# Patient Record
Sex: MALE | Race: WHITE | NOT HISPANIC OR LATINO | URBAN - METROPOLITAN AREA
[De-identification: names, ages, dates, MRNs, and addresses within clinical notes are randomized per-mention and may not be internally consistent; named-entity substitution may affect disease eponyms.]

---

## 2017-04-18 ENCOUNTER — INPATIENT (INPATIENT)
Facility: HOSPITAL | Age: 82
LOS: 2 days | Discharge: NURSING, REHAB OR LONG-TERM CARE | End: 2017-04-21
Attending: HOSPITALIST | Admitting: HOSPITALIST
Payer: COMMERCIAL

## 2017-04-18 VITALS
WEIGHT: 179.9 LBS | SYSTOLIC BLOOD PRESSURE: 119 MMHG | RESPIRATION RATE: 17 BRPM | HEART RATE: 103 BPM | HEIGHT: 62 IN | DIASTOLIC BLOOD PRESSURE: 78 MMHG | OXYGEN SATURATION: 96 %

## 2017-04-18 DIAGNOSIS — I10 ESSENTIAL (PRIMARY) HYPERTENSION: ICD-10-CM

## 2017-04-18 DIAGNOSIS — R73.03 PREDIABETES: ICD-10-CM

## 2017-04-18 DIAGNOSIS — Z29.9 ENCOUNTER FOR PROPHYLACTIC MEASURES, UNSPECIFIED: ICD-10-CM

## 2017-04-18 DIAGNOSIS — E66.09 OTHER OBESITY DUE TO EXCESS CALORIES: ICD-10-CM

## 2017-04-18 DIAGNOSIS — R29.6 REPEATED FALLS: ICD-10-CM

## 2017-04-18 DIAGNOSIS — I48.2 CHRONIC ATRIAL FIBRILLATION: ICD-10-CM

## 2017-04-18 DIAGNOSIS — R05 COUGH: ICD-10-CM

## 2017-04-18 LAB
ALBUMIN SERPL ELPH-MCNC: 3.3 G/DL — SIGNIFICANT CHANGE UP (ref 3.3–5)
ALP SERPL-CCNC: 55 U/L — SIGNIFICANT CHANGE UP (ref 40–120)
ALT FLD-CCNC: 36 U/L — SIGNIFICANT CHANGE UP (ref 12–78)
ANION GAP SERPL CALC-SCNC: 8 MMOL/L — SIGNIFICANT CHANGE UP (ref 5–17)
APTT BLD: 41.4 SEC — HIGH (ref 27.5–37.4)
AST SERPL-CCNC: 29 U/L — SIGNIFICANT CHANGE UP (ref 15–37)
BASOPHILS # BLD AUTO: 0 K/UL — SIGNIFICANT CHANGE UP (ref 0–0.2)
BASOPHILS NFR BLD AUTO: 0.6 % — SIGNIFICANT CHANGE UP (ref 0–2)
BILIRUB SERPL-MCNC: 0.4 MG/DL — SIGNIFICANT CHANGE UP (ref 0.2–1.2)
BUN SERPL-MCNC: 18 MG/DL — SIGNIFICANT CHANGE UP (ref 7–23)
CALCIUM SERPL-MCNC: 8.2 MG/DL — LOW (ref 8.5–10.1)
CHLORIDE SERPL-SCNC: 107 MMOL/L — SIGNIFICANT CHANGE UP (ref 96–108)
CO2 SERPL-SCNC: 28 MMOL/L — SIGNIFICANT CHANGE UP (ref 22–31)
CREAT SERPL-MCNC: 0.89 MG/DL — SIGNIFICANT CHANGE UP (ref 0.5–1.3)
EOSINOPHIL # BLD AUTO: 0.1 K/UL — SIGNIFICANT CHANGE UP (ref 0–0.5)
EOSINOPHIL NFR BLD AUTO: 1.9 % — SIGNIFICANT CHANGE UP (ref 0–6)
GLUCOSE SERPL-MCNC: 105 MG/DL — HIGH (ref 70–99)
HCT VFR BLD CALC: 40.5 % — SIGNIFICANT CHANGE UP (ref 39–50)
HGB BLD-MCNC: 14.6 G/DL — SIGNIFICANT CHANGE UP (ref 13–17)
INR BLD: 2.69 RATIO — HIGH (ref 0.88–1.16)
LACTATE SERPL-SCNC: 1 MMOL/L — SIGNIFICANT CHANGE UP (ref 0.7–2)
LYMPHOCYTES # BLD AUTO: 1.6 K/UL — SIGNIFICANT CHANGE UP (ref 1–3.3)
LYMPHOCYTES # BLD AUTO: 27.3 % — SIGNIFICANT CHANGE UP (ref 13–44)
MCHC RBC-ENTMCNC: 32.2 PG — SIGNIFICANT CHANGE UP (ref 27–34)
MCHC RBC-ENTMCNC: 36.1 GM/DL — HIGH (ref 32–36)
MCV RBC AUTO: 89.3 FL — SIGNIFICANT CHANGE UP (ref 80–100)
MONOCYTES # BLD AUTO: 0.6 K/UL — SIGNIFICANT CHANGE UP (ref 0–0.9)
MONOCYTES NFR BLD AUTO: 10.3 % — SIGNIFICANT CHANGE UP (ref 2–14)
NEUTROPHILS # BLD AUTO: 3.6 K/UL — SIGNIFICANT CHANGE UP (ref 1.8–7.4)
NEUTROPHILS NFR BLD AUTO: 59.9 % — SIGNIFICANT CHANGE UP (ref 43–77)
PLATELET # BLD AUTO: 143 K/UL — LOW (ref 150–400)
POTASSIUM SERPL-MCNC: 4.1 MMOL/L — SIGNIFICANT CHANGE UP (ref 3.5–5.3)
POTASSIUM SERPL-SCNC: 4.1 MMOL/L — SIGNIFICANT CHANGE UP (ref 3.5–5.3)
PROT SERPL-MCNC: 7.2 GM/DL — SIGNIFICANT CHANGE UP (ref 6–8.3)
PROTHROM AB SERPL-ACNC: 29.9 SEC — HIGH (ref 9.8–12.7)
RBC # BLD: 4.54 M/UL — SIGNIFICANT CHANGE UP (ref 4.2–5.8)
RBC # FLD: 11.8 % — SIGNIFICANT CHANGE UP (ref 11–15)
SODIUM SERPL-SCNC: 143 MMOL/L — SIGNIFICANT CHANGE UP (ref 135–145)
WBC # BLD: 6 K/UL — SIGNIFICANT CHANGE UP (ref 3.8–10.5)
WBC # FLD AUTO: 6 K/UL — SIGNIFICANT CHANGE UP (ref 3.8–10.5)

## 2017-04-18 PROCEDURE — 76377 3D RENDER W/INTRP POSTPROCES: CPT | Mod: 26

## 2017-04-18 PROCEDURE — 99223 1ST HOSP IP/OBS HIGH 75: CPT

## 2017-04-18 PROCEDURE — 71250 CT THORAX DX C-: CPT | Mod: 26

## 2017-04-18 PROCEDURE — 99285 EMERGENCY DEPT VISIT HI MDM: CPT

## 2017-04-18 PROCEDURE — 70450 CT HEAD/BRAIN W/O DYE: CPT | Mod: 26

## 2017-04-18 PROCEDURE — 71010: CPT | Mod: 26

## 2017-04-18 PROCEDURE — 72131 CT LUMBAR SPINE W/O DYE: CPT | Mod: 26

## 2017-04-18 RX ORDER — MORPHINE SULFATE 50 MG/1
2 CAPSULE, EXTENDED RELEASE ORAL EVERY 4 HOURS
Qty: 0 | Refills: 0 | Status: DISCONTINUED | OUTPATIENT
Start: 2017-04-18 | End: 2017-04-21

## 2017-04-18 RX ORDER — WARFARIN SODIUM 2.5 MG/1
5 TABLET ORAL DAILY
Qty: 0 | Refills: 0 | Status: DISCONTINUED | OUTPATIENT
Start: 2017-04-18 | End: 2017-04-18

## 2017-04-18 RX ORDER — TRAMADOL HYDROCHLORIDE 50 MG/1
50 TABLET ORAL ONCE
Qty: 0 | Refills: 0 | Status: DISCONTINUED | OUTPATIENT
Start: 2017-04-18 | End: 2017-04-18

## 2017-04-18 RX ORDER — ACETAMINOPHEN 500 MG
650 TABLET ORAL EVERY 6 HOURS
Qty: 0 | Refills: 0 | Status: DISCONTINUED | OUTPATIENT
Start: 2017-04-18 | End: 2017-04-21

## 2017-04-18 RX ORDER — LISINOPRIL 2.5 MG/1
10 TABLET ORAL DAILY
Qty: 0 | Refills: 0 | Status: DISCONTINUED | OUTPATIENT
Start: 2017-04-18 | End: 2017-04-21

## 2017-04-18 RX ORDER — CYCLOBENZAPRINE HYDROCHLORIDE 10 MG/1
10 TABLET, FILM COATED ORAL THREE TIMES A DAY
Qty: 0 | Refills: 0 | Status: DISCONTINUED | OUTPATIENT
Start: 2017-04-18 | End: 2017-04-21

## 2017-04-18 RX ORDER — FOLIC ACID 0.8 MG
1 TABLET ORAL DAILY
Qty: 0 | Refills: 0 | Status: DISCONTINUED | OUTPATIENT
Start: 2017-04-18 | End: 2017-04-21

## 2017-04-18 RX ORDER — WARFARIN SODIUM 2.5 MG/1
6 TABLET ORAL DAILY
Qty: 0 | Refills: 0 | Status: DISCONTINUED | OUTPATIENT
Start: 2017-04-18 | End: 2017-04-18

## 2017-04-18 RX ORDER — WARFARIN SODIUM 2.5 MG/1
5 TABLET ORAL ONCE
Qty: 0 | Refills: 0 | Status: COMPLETED | OUTPATIENT
Start: 2017-04-18 | End: 2017-04-18

## 2017-04-18 RX ORDER — DOXAZOSIN MESYLATE 4 MG
2 TABLET ORAL AT BEDTIME
Qty: 0 | Refills: 0 | Status: DISCONTINUED | OUTPATIENT
Start: 2017-04-18 | End: 2017-04-21

## 2017-04-18 RX ORDER — TAMSULOSIN HYDROCHLORIDE 0.4 MG/1
0.4 CAPSULE ORAL AT BEDTIME
Qty: 0 | Refills: 0 | Status: DISCONTINUED | OUTPATIENT
Start: 2017-04-18 | End: 2017-04-21

## 2017-04-18 RX ORDER — CEFUROXIME AXETIL 250 MG
250 TABLET ORAL EVERY 12 HOURS
Qty: 0 | Refills: 0 | Status: COMPLETED | OUTPATIENT
Start: 2017-04-18 | End: 2017-04-21

## 2017-04-18 RX ORDER — METOPROLOL TARTRATE 50 MG
25 TABLET ORAL DAILY
Qty: 0 | Refills: 0 | Status: DISCONTINUED | OUTPATIENT
Start: 2017-04-18 | End: 2017-04-21

## 2017-04-18 RX ADMIN — TRAMADOL HYDROCHLORIDE 50 MILLIGRAM(S): 50 TABLET ORAL at 20:21

## 2017-04-18 RX ADMIN — TAMSULOSIN HYDROCHLORIDE 0.4 MILLIGRAM(S): 0.4 CAPSULE ORAL at 23:58

## 2017-04-18 RX ADMIN — TRAMADOL HYDROCHLORIDE 50 MILLIGRAM(S): 50 TABLET ORAL at 21:26

## 2017-04-18 RX ADMIN — WARFARIN SODIUM 5 MILLIGRAM(S): 2.5 TABLET ORAL at 23:59

## 2017-04-18 RX ADMIN — Medication 100 MILLIGRAM(S): at 23:58

## 2017-04-18 NOTE — H&P ADULT. - NEUROLOGICAL DETAILS
sensation intact/alert and oriented x 3/responds to pain/responds to verbal commands/normal strength/no spontaneous movement/superficial reflexes intact/deep reflexes intact

## 2017-04-18 NOTE — H&P ADULT. - PROBLEM SELECTOR PLAN 4
CHO diet  Will order A1C Continue Coumadin-family unsure of exact dosing (they think it is 5 mg MWF, 6 mg TThSSun), confirm dosing  INR ordered

## 2017-04-18 NOTE — H&P ADULT. - PROBLEM SELECTOR PLAN 1
Likely due to worsening sciatica  Obtain records from "back specialist" (family will bring more information tomorrow during the day regarding type of doctor and treatments to date)  PT consult  Ortho consult  MRI LS  Morphine 2mg IM [id number] PRN pain 7-10  Percocet 5/325 mg po [id number] PRN pain 4-7  Tylenol 650 mg po [id number] PRN pain 1-3.  Flexeril 10mg po TID

## 2017-04-18 NOTE — H&P ADULT. - NEGATIVE NEUROLOGICAL SYMPTOMS
no loss of consciousness/no hemiparesis/no syncope/no facial palsy/no generalized seizures/no paresthesias/no tremors/no headache/no transient paralysis/no confusion/no loss of sensation/no weakness

## 2017-04-18 NOTE — H&P ADULT. - PROBLEM SELECTOR PLAN 2
Continue home meds, titrate as necessary Abx were started by his PMD as outpt for cough, resolved  Will complete course (will be done this Friday, 3 more days)

## 2017-04-18 NOTE — ED PROVIDER NOTE - OBJECTIVE STATEMENT
83 yo with cough for four days and multiple falls over the last one week. Patient states that his left buttock hurts. Patient denies chest pain, syncope, headache.

## 2017-04-18 NOTE — H&P ADULT. - HISTORY OF PRESENT ILLNESS
82 year old Sami/English speaking man with PMH HTN, A Fib on coumadin, history of prostate Ca s/p radiation, and sciatica presents to ED with frequent falls and severe lumbar back Pain.  As per patient and family (wife, daughter) patient has been seeing a "back specialist" as his pain has been increasing.  Recently the pain has gotten so bad that he falls when he walks due to the pain.  patient says the pain starts in his lower back and radiates down his left leg.  He denies loss of sensation, loss of bowel or bladder control, loss of function, fever, chills.    In the ED, CT LS showed multilevel severe degenerative changes.

## 2017-04-18 NOTE — H&P ADULT. - PROBLEM SELECTOR PLAN 3
Continue Coumadin-family unsure of exact dosing (they think it is 5 mg MWF, 6 mg TThSSun), confirm dosing  INR ordered Continue home meds, titrate as necessary

## 2017-04-18 NOTE — H&P ADULT. - NEGATIVE CARDIOVASCULAR SYMPTOMS
no palpitations/no orthopnea/no dyspnea on exertion/no paroxysmal nocturnal dyspnea/no claudication/no chest pain/no peripheral edema

## 2017-04-18 NOTE — ED ADULT NURSE NOTE - CHIEF COMPLAINT QUOTE
slipped and fell while using walker denies any dizziness or loc. Pt c/o lower back pain and left leg pain. Pt had recurrent episode of falling this week. Pt on antibiotic for pneumonia

## 2017-04-18 NOTE — H&P ADULT. - ASSESSMENT
82 year old Azeri/English speaking man with PMH HTN, A Fib on coumadin, history of prostate Ca s/p radiation, and sciatica presents to ED with frequent falls and severe lumbar back Pain; patient will require admission for at least 2 midnights for further evaluation and mgmt of severe lumbar pain as detailed below:

## 2017-04-18 NOTE — ED ADULT NURSE NOTE - PMH
Atrial fibrillation    Borderline diabetes    Cataract of left eye    Cervical disc displacement    HTN (hypertension)    Hx of radiation therapy  2011  Kidney cyst, acquired  left  Obesity (BMI 30-39.9)    Prostate cancer  2011

## 2017-04-19 DIAGNOSIS — M54.9 DORSALGIA, UNSPECIFIED: ICD-10-CM

## 2017-04-19 DIAGNOSIS — E66.9 OBESITY, UNSPECIFIED: ICD-10-CM

## 2017-04-19 LAB
HBA1C BLD-MCNC: 7 % — HIGH (ref 4–5.6)
INR BLD: 2.67 RATIO — HIGH (ref 0.88–1.16)
PROTHROM AB SERPL-ACNC: 29.7 SEC — HIGH (ref 9.8–12.7)

## 2017-04-19 PROCEDURE — 99233 SBSQ HOSP IP/OBS HIGH 50: CPT

## 2017-04-19 PROCEDURE — 72100 X-RAY EXAM L-S SPINE 2/3 VWS: CPT | Mod: 26

## 2017-04-19 PROCEDURE — 71030: CPT | Mod: 26,52

## 2017-04-19 PROCEDURE — 72148 MRI LUMBAR SPINE W/O DYE: CPT | Mod: 26

## 2017-04-19 RX ORDER — DEXAMETHASONE 0.5 MG/5ML
10 ELIXIR ORAL EVERY 8 HOURS
Qty: 0 | Refills: 0 | Status: COMPLETED | OUTPATIENT
Start: 2017-04-19 | End: 2017-04-20

## 2017-04-19 RX ORDER — WARFARIN SODIUM 2.5 MG/1
5 TABLET ORAL ONCE
Qty: 0 | Refills: 0 | Status: COMPLETED | OUTPATIENT
Start: 2017-04-19 | End: 2017-04-19

## 2017-04-19 RX ADMIN — Medication 250 MILLIGRAM(S): at 06:26

## 2017-04-19 RX ADMIN — Medication 1 TABLET(S): at 11:00

## 2017-04-19 RX ADMIN — WARFARIN SODIUM 5 MILLIGRAM(S): 2.5 TABLET ORAL at 23:13

## 2017-04-19 RX ADMIN — Medication 102 MILLIGRAM(S): at 23:12

## 2017-04-19 RX ADMIN — TAMSULOSIN HYDROCHLORIDE 0.4 MILLIGRAM(S): 0.4 CAPSULE ORAL at 23:13

## 2017-04-19 RX ADMIN — Medication 2 MILLIGRAM(S): at 00:08

## 2017-04-19 RX ADMIN — Medication 102 MILLIGRAM(S): at 14:35

## 2017-04-19 RX ADMIN — Medication 250 MILLIGRAM(S): at 17:49

## 2017-04-19 RX ADMIN — Medication 100 MILLIGRAM(S): at 06:26

## 2017-04-19 RX ADMIN — Medication 1 MILLIGRAM(S): at 11:00

## 2017-04-19 RX ADMIN — Medication 2 MILLIGRAM(S): at 23:13

## 2017-04-19 RX ADMIN — Medication 100 MILLIGRAM(S): at 14:36

## 2017-04-19 RX ADMIN — Medication 100 MILLIGRAM(S): at 23:14

## 2017-04-19 NOTE — PHYSICAL THERAPY INITIAL EVALUATION ADULT - ADDITIONAL COMMENTS
As per patient, lives c wife in private house c 3 stair steps to enter. Level floor to bedroom/bathroom. Used a cane prior to admission. Denies HHA services.

## 2017-04-19 NOTE — DIETITIAN INITIAL EVALUATION ADULT. - OTHER INFO
Pt seen for MD consult 4/18 Education & Assessment. Pt lives with wife; wife does cooking & food shopping. Pt with hx prediabetes & currently follows unrestricted diet. No BM x 1 day. Pt consumed 100% breakfast this am.

## 2017-04-19 NOTE — PHYSICAL THERAPY INITIAL EVALUATION ADULT - PERTINENT HX OF CURRENT PROBLEM, REHAB EVAL
Patient came in c increased low back pain and increased falls. Ortho consulted and ruled out cord compression or fractures. Chart reviewed and noted MRI spine showing multi-level degenerative changes; XRay LS showing stenosis.

## 2017-04-19 NOTE — PHYSICAL THERAPY INITIAL EVALUATION ADULT - CRITERIA FOR SKILLED THERAPEUTIC INTERVENTIONS
impairments found/anticipated discharge recommendation/therapy frequency/functional limitations in following categories/risk reduction/prevention/rehab potential

## 2017-04-19 NOTE — PHYSICAL THERAPY INITIAL EVALUATION ADULT - TRANSFER SAFETY CONCERNS NOTED: BED/CHAIR, REHAB EVAL
decreased balance during turns/decreased safety awareness/losing balance/decreased weight-shifting ability

## 2017-04-19 NOTE — DIETITIAN INITIAL EVALUATION ADULT. - PROBLEM SELECTOR PLAN 4
Continue Coumadin-family unsure of exact dosing (they think it is 5 mg MWF, 6 mg TThSSun), confirm dosing  INR ordered

## 2017-04-19 NOTE — PHYSICAL THERAPY INITIAL EVALUATION ADULT - PLANNED THERAPY INTERVENTIONS, PT EVAL
swiss ball techniques/strengthening/bed mobility training/balance training/postural re-education/joint mobilization/stretching/neuromuscular re-education/ROM/gait training

## 2017-04-19 NOTE — DIETITIAN INITIAL EVALUATION ADULT. - SOURCE
other (specify)/Pt's primary language Citizen of [place]. Pt speaks minimal English & prefers to use wife as .  Spoke to pt's wife & medical chart/family/significant other

## 2017-04-19 NOTE — PROGRESS NOTE ADULT - SUBJECTIVE AND OBJECTIVE BOX
Patient is a 82y old  Male who presents with a chief complaint of s/p lami c5-c6  2013  now falling (19 Apr 2017 00:33)       OVERNIGHT EVENTS: c/o back pain overnight     MEDICATIONS  (STANDING):  lisinopril 10milliGRAM(s) Oral daily  doxazosin 2milliGRAM(s) Oral at bedtime  tamsulosin 0.4milliGRAM(s) Oral at bedtime  pregabalin 100milliGRAM(s) Oral three times a day  metoprolol succinate ER 25milliGRAM(s) Oral daily  cefuroxime   Tablet 250milliGRAM(s) Oral every 12 hours  calcium carbonate 1250 mG + Vitamin D (OsCal 500 + D) 1Tablet(s) Oral daily  folic acid 1milliGRAM(s) Oral daily  dexamethasone  IVPB 10milliGRAM(s) IV Intermittent every 8 hours    MEDICATIONS  (PRN):  morphine  - Injectable 2milliGRAM(s) IntraMuscular every 4 hours PRN Severe Pain (7 - 10)  acetaminophen   Tablet. 650milliGRAM(s) Oral every 6 hours PRN Mild Pain (1 - 3)  cyclobenzaprine 10milliGRAM(s) Oral three times a day PRN Muscle Spasm  oxyCODONE  5 mG/acetaminophen 325 mG 2Tablet(s) Oral every 6 hours PRN Moderate Pain (4 - 6)       Vital Signs Last 24 Hrs  T(C): 36.6, Max: 37.1 (04-18 @ 19:14)  T(F): 97.9, Max: 98.8 (04-18 @ 19:14)  HR: 107 (80 - 107)  BP: 118/64 (98/54 - 138/87)  BP(mean): --  RR: 16 (14 - 18)  SpO2: 96% (93% - 99%)    PHYSICAL EXAM:  GENERAL: NAD, well-groomed, well-developed  HEAD:  Atraumatic, Normocephalic  ENMT: No tonsillar erythema, exudates, or enlargement; Moist mucous membranes   NERVOUS SYSTEM:  Alert & Oriented X3, no focal deficits  CHEST/LUNG: Clear to percussion bilaterally; No rales, rhonchi, wheezing, or rubs  HEART: Regular rate and rhythm; No murmurs, rubs, or gallops  ABDOMEN: Soft, Nontender, Nondistended; Bowel sounds present  EXTREMITIES:  2+ Peripheral Pulses, No clubbing, cyanosis, or edema, decreased ROM 2/2 to pain      LABS:                        14.6   6.0   )-----------( 143      ( 18 Apr 2017 16:50 )             40.5     04-18    143  |  107  |  18  ----------------------------<  105<H>  4.1   |  28  |  0.89    Ca    8.2<L>      18 Apr 2017 16:50    TPro  7.2  /  Alb  3.3  /  TBili  0.4  /  DBili  x   /  AST  29  /  ALT  36  /  AlkPhos  55  04-18    PT/INR - ( 19 Apr 2017 08:00 )   PT: 29.7 sec;   INR: 2.67 ratio    Lumbar lordosis is maintained. Minimal grade 1 retrolisthesis L1 on L2,   L2 on L3, and L3 on L4. Vertebral body heights are preserved.   Degenerative Modic endplate signal changes and irregularities are seen at  every level. There is multilevel disc desiccation with loss of space   heights of the lumbar spine.    L1-L2: Diffuse disc bulge and posterior osteophytic ridging and mild   facet arthropathy contributes to mild central canal stenosis. No   significant neuroforaminal narrowing.    L2-L3: Diffuse disc bulge with posterior osteophytic ridging, facet   arthropathy and ligamenta flava thickening contributes to mild central   canal stenosis. No significant neuroforaminal narrowing.    L3-L4: Diffusedisc bulge with posterior osteophytic ridging, facet   arthropathy and ligamenta flava thickening contributes to mild right   neural foraminal narrowing. No significant central canal stenosis.    L4-L5: Small posterior disc osteophyte complex, facet arthropathy and   ligamenta flava thickening contributes to mild right neural foraminal   narrowing. No significant central canal stenosis.    L5-S1: Posterior disc bulge with left subarticular annular fissure, and   facet arthropathy contribute to moderate-severe right and mild left   neural foraminal    The paraspinal muscles are unremarkable. Partially visualized right renal   cyst.      X RAY : Extensive degenerative changes lumbosacral spine. Probable multilevel   stenosis. Follow-up MR or CT imaging may be considered if clinically   warranted    PTT - ( 18 Apr 2017 23:24 )  PTT:41.4 sec   cardiac markers     CAPILLARY BLOOD GLUCOSE    Cultures    RADIOLOGY & ADDITIONAL TESTS:    Imaging Personally Reviewed:  [ x] YES  [ ] NO    Consultant(s) Notes Reviewed:  [x ] YES  [ ] NO    Care Discussed with Consultants/Other Providers [ x] YES  [ ] NO

## 2017-04-19 NOTE — PHYSICAL THERAPY INITIAL EVALUATION ADULT - TRANSFER SAFETY CONCERNS NOTED: SIT/STAND, REHAB EVAL
stepping too close to front of assistive device/decreased balance during turns/decreased step length

## 2017-04-19 NOTE — DIETITIAN INITIAL EVALUATION ADULT. - PROBLEM SELECTOR PLAN 2
Abx were started by his PMD as outpt for cough, resolved  Will complete course (will be done this Friday, 3 more days)

## 2017-04-19 NOTE — CONSULT NOTE ADULT - SUBJECTIVE AND OBJECTIVE BOX
82yMale c/o low back pain and LLE radiculopathy, which has worsened over the last two weeks causing him to fall frequently. Patient denies any hx of trauma, fever, or chills. Patient denies any numbness or tingling in the lower extremities or loss of motor function. Patient denies any changes in urinary or bowel control.    PMH:  Obesity (BMI 30-39.9)  Cervical disc displacement  Atrial fibrillation  Kidney cyst, acquired  Borderline diabetes  Cataract of left eye  Hx of radiation therapy  Prostate cancer  HTN (hypertension)    PSH:  Larynx polyp  Cataract extraction status of left eye    AH: ASA    Meds: See med rec    T(C): 36.7  HR: 89  BP: 110/58  RR: 16  SpO2: 94%  Wt(kg): --    PE Spine:  No TTP over spinous processes, Able to SLR, SILT L2-S1, L3-S1 5/5 BL, +Distal pulses, (-) schulz, (-) clonus, (+) SLR, (-) babinski, 2/4 patella tendon reflex BL.    Secondary:  No TTP over bony landmarks, SILT BL, ROM intact BL, distal pulses palpable.    Imaging:  CT demonstrating grade 1 retrolisthesis of L1 on L2, L2 on L3, and L3 on L4.

## 2017-04-19 NOTE — PHYSICAL THERAPY INITIAL EVALUATION ADULT - MODIFIED CLINICAL TEST OF SENSORY INTEGRATION IN BALANCE TEST
Barthel Index: Feeding Score _10__, Bathing Score _0__, Grooming Score _0__, Dressing Score _5 __, Bowels Score _5__, Bladder Score _5__, Toilet Score _5__, Transfers Score _5__, Mobility Score _0__, Stairs Score _0__,     Total Score _35__

## 2017-04-19 NOTE — DIETITIAN INITIAL EVALUATION ADULT. - PERTINENT MEDS FT
Ceftin, Decadron, Tylenol, Flexeril, Morphine, Percocet, Oscal +D, Folic acid, Toprol LX, Lyrica, Cardura, Flomax, Zestril

## 2017-04-19 NOTE — PHYSICAL THERAPY INITIAL EVALUATION ADULT - GENERAL OBSERVATIONS, REHAB EVAL
Patient encountered supine in bed, vital signs as charted. IV steroid intact. AAOx4. Reports pain on left low back and hip; denies shortness of breath. Chest Auscultation: diminished breath sounds throughout. Palpation: no tactile fremitus. Cough: strong, productive, swallowed.

## 2017-04-19 NOTE — DIETITIAN INITIAL EVALUATION ADULT. - PROBLEM SELECTOR PLAN 1
Likely due to worsening sciatica  Obtain records from "back specialist" (family will bring more information tomorrow during the day regarding type of doctor and treatments to date)  PT consult  Ortho consult  MRI LS  Morphine 2mg IM [id number] PRN pain 7-10  Percocet 5/325 mg po [id number] PRN pain 4-7  Tylenol 650 mg po [id number] PRN pain 1-3.  Flexeril 10mg po TID

## 2017-04-19 NOTE — PROGRESS NOTE ADULT - ASSESSMENT
82M w/ sciatica and LLE radiculopathy  Analgesia  Medrol Dosepak upon discharge  DVT ppx  WBAT  PT/OT  Follow-up with Dr. Tejada as outpatient next week  No acute surgical intervention  Orthopedic stable for discharge

## 2017-04-19 NOTE — PROGRESS NOTE ADULT - SUBJECTIVE AND OBJECTIVE BOX
Imaging reviewed - no signs of cord compression or instability with flexion/extension imaging of the lumbar spine.

## 2017-04-19 NOTE — PROGRESS NOTE ADULT - ASSESSMENT
82 year old w/h/o  HTN, A Fib on coumadin, Prostate Ca s/p radiation and sciatica presents to ED with frequent falls and severe lumbar back Pain.

## 2017-04-19 NOTE — CONSULT NOTE ADULT - ATTENDING COMMENTS
Pt's treatment plan reviewed and agree with conservative plan.  If symptoms not improving then will reevaluate treatment options

## 2017-04-19 NOTE — CONSULT NOTE ADULT - ASSESSMENT
82M w/ LLE Sciatica and no neurological deficits  Analgesia  IV Decadron 10mg Q8H x3 doses  WBAT  DVT ppx while admitted  PT/OT  FU MRI L Spine (ordered by primary team)  Will d/w attending any further recomendations

## 2017-04-19 NOTE — DIETITIAN INITIAL EVALUATION ADULT. - PERTINENT LABORATORY DATA
04-18 Na 143 mmol/L Glu 105 mg/dL<H> K+ 4.1 mmol/L Cr  0.89 mg/dL BUN 18 mg/dL Phos n/a   Alb 3.3 g/dL PAB n/a   Hgb 14.6 g/dL Hct 40.5 %

## 2017-04-20 LAB
ANION GAP SERPL CALC-SCNC: 10 MMOL/L — SIGNIFICANT CHANGE UP (ref 5–17)
BUN SERPL-MCNC: 25 MG/DL — HIGH (ref 7–23)
CALCIUM SERPL-MCNC: 8.4 MG/DL — LOW (ref 8.5–10.1)
CHLORIDE SERPL-SCNC: 105 MMOL/L — SIGNIFICANT CHANGE UP (ref 96–108)
CO2 SERPL-SCNC: 25 MMOL/L — SIGNIFICANT CHANGE UP (ref 22–31)
CREAT SERPL-MCNC: 1.06 MG/DL — SIGNIFICANT CHANGE UP (ref 0.5–1.3)
GLUCOSE SERPL-MCNC: 243 MG/DL — HIGH (ref 70–99)
HCT VFR BLD CALC: 40.5 % — SIGNIFICANT CHANGE UP (ref 39–50)
HGB BLD-MCNC: 14.5 G/DL — SIGNIFICANT CHANGE UP (ref 13–17)
INR BLD: 2.91 RATIO — HIGH (ref 0.88–1.16)
MCHC RBC-ENTMCNC: 31.8 PG — SIGNIFICANT CHANGE UP (ref 27–34)
MCHC RBC-ENTMCNC: 35.7 GM/DL — SIGNIFICANT CHANGE UP (ref 32–36)
MCV RBC AUTO: 88.9 FL — SIGNIFICANT CHANGE UP (ref 80–100)
PLATELET # BLD AUTO: 178 K/UL — SIGNIFICANT CHANGE UP (ref 150–400)
POTASSIUM SERPL-MCNC: 4.6 MMOL/L — SIGNIFICANT CHANGE UP (ref 3.5–5.3)
POTASSIUM SERPL-SCNC: 4.6 MMOL/L — SIGNIFICANT CHANGE UP (ref 3.5–5.3)
PROTHROM AB SERPL-ACNC: 32.4 SEC — HIGH (ref 9.8–12.7)
RBC # BLD: 4.56 M/UL — SIGNIFICANT CHANGE UP (ref 4.2–5.8)
RBC # FLD: 11.5 % — SIGNIFICANT CHANGE UP (ref 11–15)
SODIUM SERPL-SCNC: 140 MMOL/L — SIGNIFICANT CHANGE UP (ref 135–145)
WBC # BLD: 6.1 K/UL — SIGNIFICANT CHANGE UP (ref 3.8–10.5)
WBC # FLD AUTO: 6.1 K/UL — SIGNIFICANT CHANGE UP (ref 3.8–10.5)

## 2017-04-20 PROCEDURE — 99233 SBSQ HOSP IP/OBS HIGH 50: CPT

## 2017-04-20 RX ADMIN — Medication 250 MILLIGRAM(S): at 05:24

## 2017-04-20 RX ADMIN — Medication 100 MILLIGRAM(S): at 22:00

## 2017-04-20 RX ADMIN — Medication 100 MILLIGRAM(S): at 13:38

## 2017-04-20 RX ADMIN — Medication 102 MILLIGRAM(S): at 05:24

## 2017-04-20 RX ADMIN — Medication 100 MILLIGRAM(S): at 05:24

## 2017-04-20 RX ADMIN — Medication 1 MILLIGRAM(S): at 11:01

## 2017-04-20 RX ADMIN — TAMSULOSIN HYDROCHLORIDE 0.4 MILLIGRAM(S): 0.4 CAPSULE ORAL at 22:00

## 2017-04-20 RX ADMIN — Medication 25 MILLIGRAM(S): at 05:24

## 2017-04-20 RX ADMIN — Medication 2 MILLIGRAM(S): at 22:00

## 2017-04-20 RX ADMIN — Medication 1 TABLET(S): at 11:01

## 2017-04-20 RX ADMIN — Medication 250 MILLIGRAM(S): at 17:05

## 2017-04-20 RX ADMIN — Medication 100 MILLIGRAM(S): at 17:07

## 2017-04-20 RX ADMIN — LISINOPRIL 10 MILLIGRAM(S): 2.5 TABLET ORAL at 05:24

## 2017-04-20 NOTE — PROGRESS NOTE ADULT - PROBLEM SELECTOR PLAN 1
- MRI LS noted  - no neurological deficits  - on decadron x3 doses  - pain control- morphine, percocet PRN, lyrica, flexiril  - PT eval =KEENAN  - ortho f/u
- MRI LS noted  - no neurological deficits  - on decadron x3 doses  - pain control- morphine, percocet PRN, lyrica, flexiril  - PT eval =KEENAN  - ortho f/u

## 2017-04-20 NOTE — PROGRESS NOTE ADULT - SUBJECTIVE AND OBJECTIVE BOX
Patient is a 82y old  Male who presents with a chief complaint of s/p lami c5-c6  2013  now falling (19 Apr 2017 00:33)       OVERNIGHT EVENTS: no reported events    MEDICATIONS  (STANDING):  lisinopril 10milliGRAM(s) Oral daily  doxazosin 2milliGRAM(s) Oral at bedtime  tamsulosin 0.4milliGRAM(s) Oral at bedtime  pregabalin 100milliGRAM(s) Oral three times a day  metoprolol succinate ER 25milliGRAM(s) Oral daily  cefuroxime   Tablet 250milliGRAM(s) Oral every 12 hours  calcium carbonate 1250 mG + Vitamin D (OsCal 500 + D) 1Tablet(s) Oral daily  folic acid 1milliGRAM(s) Oral daily    MEDICATIONS  (PRN):  morphine  - Injectable 2milliGRAM(s) IntraMuscular every 4 hours PRN Severe Pain (7 - 10)  acetaminophen   Tablet. 650milliGRAM(s) Oral every 6 hours PRN Mild Pain (1 - 3)  cyclobenzaprine 10milliGRAM(s) Oral three times a day PRN Muscle Spasm  oxyCODONE  5 mG/acetaminophen 325 mG 2Tablet(s) Oral every 6 hours PRN Moderate Pain (4 - 6)      Vital Signs Last 24 Hrs  T(C): 36.6, Max: 36.7 (04-19 @ 23:31)  T(F): 97.9, Max: 98 (04-19 @ 23:31)  HR: 96 (92 - 107)  BP: 89/49 (89/49 - 118/64)  BP(mean): --  RR: 18 (16 - 18)  SpO2: 94% (94% - 98%)     PHYSICAL EXAM:  GENERAL: NAD, well-groomed, well-developed, sitting up in chair , spouse at bedside  HEAD:  Atraumatic, Normocephalic  ENMT: No tonsillar erythema, exudates, or enlargement; Moist mucous membranes   NERVOUS SYSTEM:  Alert & Oriented X3, no focal deficits  CHEST/LUNG: Clear to percussion bilaterally; No rales, rhonchi, wheezing, or rubs  HEART: Regular rate and rhythm; No murmurs, rubs, or gallops  ABDOMEN: Soft, Nontender, Nondistended; Bowel sounds present  EXTREMITIES:  2+ Peripheral Pulses, No clubbing, cyanosis, or edema, improved pain with movement    LABS:                        14.5   6.1   )-----------( 178      ( 20 Apr 2017 07:17 )             40.5     04-20    140  |  105  |  25<H>  ----------------------------<  243<H>  4.6   |  25  |  1.06    Ca    8.4<L>      20 Apr 2017 07:17    TPro  7.2  /  Alb  3.3  /  TBili  0.4  /  DBili  x   /  AST  29  /  ALT  36  /  AlkPhos  55  04-18    PT/INR - ( 20 Apr 2017 07:17 )   PT: 32.4 sec;   INR: 2.91 ratio         PTT - ( 18 Apr 2017 23:24 )  PTT:41.4 sec   cardiac markers     CAPILLARY BLOOD GLUCOSE    Cultures    RADIOLOGY & ADDITIONAL TESTS:    Imaging Personally Reviewed:  [ x] YES  [ ] NO    Consultant(s) Notes Reviewed:  [x ] YES  [ ] NO    Care Discussed with Consultants/Other Providers [x ] YES  [ ] NO

## 2017-04-21 VITALS
HEART RATE: 67 BPM | RESPIRATION RATE: 16 BRPM | TEMPERATURE: 97 F | OXYGEN SATURATION: 98 % | SYSTOLIC BLOOD PRESSURE: 114 MMHG | DIASTOLIC BLOOD PRESSURE: 62 MMHG

## 2017-04-21 LAB
INR BLD: 3.13 RATIO — HIGH (ref 0.88–1.16)
PROTHROM AB SERPL-ACNC: 34.9 SEC — HIGH (ref 9.8–12.7)

## 2017-04-21 PROCEDURE — 99239 HOSP IP/OBS DSCHRG MGMT >30: CPT

## 2017-04-21 RX ORDER — SODIUM CHLORIDE 9 MG/ML
1000 INJECTION, SOLUTION INTRAVENOUS
Qty: 0 | Refills: 0 | Status: DISCONTINUED | OUTPATIENT
Start: 2017-04-21 | End: 2017-04-21

## 2017-04-21 RX ORDER — INSULIN LISPRO 100/ML
VIAL (ML) SUBCUTANEOUS
Qty: 0 | Refills: 0 | Status: DISCONTINUED | OUTPATIENT
Start: 2017-04-21 | End: 2017-04-21

## 2017-04-21 RX ORDER — CEFUROXIME AXETIL 250 MG
1 TABLET ORAL
Qty: 0 | Refills: 0 | COMMUNITY

## 2017-04-21 RX ORDER — INSULIN LISPRO 100/ML
0 VIAL (ML) SUBCUTANEOUS
Qty: 0 | Refills: 0 | COMMUNITY
Start: 2017-04-21

## 2017-04-21 RX ORDER — WARFARIN SODIUM 2.5 MG/1
1 TABLET ORAL
Qty: 0 | Refills: 0 | COMMUNITY

## 2017-04-21 RX ORDER — LISINOPRIL 2.5 MG/1
1 TABLET ORAL
Qty: 0 | Refills: 0 | COMMUNITY

## 2017-04-21 RX ORDER — CYCLOBENZAPRINE HYDROCHLORIDE 10 MG/1
1 TABLET, FILM COATED ORAL
Qty: 0 | Refills: 0 | COMMUNITY
Start: 2017-04-21

## 2017-04-21 RX ORDER — DEXTROSE 50 % IN WATER 50 %
25 SYRINGE (ML) INTRAVENOUS ONCE
Qty: 0 | Refills: 0 | Status: DISCONTINUED | OUTPATIENT
Start: 2017-04-21 | End: 2017-04-21

## 2017-04-21 RX ORDER — GLUCAGON INJECTION, SOLUTION 0.5 MG/.1ML
1 INJECTION, SOLUTION SUBCUTANEOUS ONCE
Qty: 0 | Refills: 0 | Status: DISCONTINUED | OUTPATIENT
Start: 2017-04-21 | End: 2017-04-21

## 2017-04-21 RX ORDER — CHOLECALCIFEROL (VITAMIN D3) 125 MCG
5000 CAPSULE ORAL
Qty: 0 | Refills: 0 | COMMUNITY

## 2017-04-21 RX ORDER — WARFARIN SODIUM 2.5 MG/1
0 TABLET ORAL
Qty: 0 | Refills: 0 | COMMUNITY

## 2017-04-21 RX ORDER — DEXTROSE 50 % IN WATER 50 %
12.5 SYRINGE (ML) INTRAVENOUS ONCE
Qty: 0 | Refills: 0 | Status: DISCONTINUED | OUTPATIENT
Start: 2017-04-21 | End: 2017-04-21

## 2017-04-21 RX ORDER — DEXTROSE 50 % IN WATER 50 %
1 SYRINGE (ML) INTRAVENOUS ONCE
Qty: 0 | Refills: 0 | Status: DISCONTINUED | OUTPATIENT
Start: 2017-04-21 | End: 2017-04-21

## 2017-04-21 RX ADMIN — Medication 1 MILLIGRAM(S): at 12:59

## 2017-04-21 RX ADMIN — Medication 100 MILLIGRAM(S): at 09:03

## 2017-04-21 RX ADMIN — Medication 1: at 17:10

## 2017-04-21 RX ADMIN — Medication 1 TABLET(S): at 12:59

## 2017-04-21 RX ADMIN — Medication 100 MILLIGRAM(S): at 13:37

## 2017-04-21 RX ADMIN — Medication 25 MILLIGRAM(S): at 06:05

## 2017-04-21 RX ADMIN — Medication 250 MILLIGRAM(S): at 06:05

## 2017-04-21 RX ADMIN — Medication 100 MILLIGRAM(S): at 06:05

## 2017-04-21 RX ADMIN — Medication 250 MILLIGRAM(S): at 17:11

## 2017-04-21 NOTE — DISCHARGE NOTE ADULT - PATIENT PORTAL LINK FT
“You can access the [facility] Patient Portal, offered by [facility], by registering with the following website: [email or web address]”

## 2017-04-21 NOTE — DISCHARGE NOTE ADULT - MEDICATION SUMMARY - MEDICATIONS TO STOP TAKING
I will STOP taking the medications listed below when I get home from the hospital:    cefuroxime 250 mg oral tablet  -- 1 tab(s) by mouth 2 times a day    lisinopril 10 mg oral tablet  -- 1 tab(s) by mouth once a day

## 2017-04-21 NOTE — DISCHARGE NOTE ADULT - NS AS DC VTE INSTRUCTION
Coumadin/Warfarin - Potential for adverse drug reactions and interactions/Coumadin/Warfarin - Compliance.../Coumadin/Warfarin - Follow-up monitoring.../Coumadin/Warfarin - Dietary Advice...

## 2017-04-21 NOTE — DISCHARGE NOTE ADULT - MEDICATION SUMMARY - MEDICATIONS TO TAKE
I will START or STAY ON the medications listed below when I get home from the hospital:    Medrol Dosepak 4 mg oral tablet  --  by mouth use as directed  -- Indication: For Sciatica w/ radiculopathy    Percocet 5/325 oral tablet  -- 1 tab(s) by mouth every 6 hours, As Needed -Moderate Pain (4 - 6) - 6)  -- Indication: For Intractable back pain    tamsulosin 0.4 mg oral capsule  -- 1 cap(s) by mouth once a day  -- Indication: For Bph    doxazosin 2 mg oral tablet  -- 1 tab(s) by mouth once a day  -- Indication: For BPH    warfarin  --  by mouth   -- Indication: For Chronic atrial fibrillation    Lyrica 100 mg oral capsule  --  by mouth 3 times a day  -- Indication: For Sciatica    HumaLOG 100 units/mL subcutaneous solution  --  subcutaneous 3 times a day (before meals); 1 Unit(s) if Glucose 151 - 200  2 Unit(s) if Glucose 201 - 250  3 Unit(s) if Glucose 251 - 300  4 Unit(s) if Glucose 301 - 350  5 Unit(s) if Glucose 351 - 400  6 Unit(s) if Glucose Greater Than 400  -- Indication: For diabetes    metoprolol succinate 25 mg oral tablet, extended release  -- 1 tab(s) by mouth once a day  -- Indication: For htn    guaiFENesin 100 mg/5 mL oral liquid  -- 5 milliliter(s) by mouth every 6 hours, As needed, Cough  -- Indication: For Cough    cyclobenzaprine 10 mg oral tablet  -- 1 tab(s) by mouth 3 times a day, As needed, Muscle Spasm  -- Indication: For Back pain    Calcium 600+D oral tablet  --  by mouth once a day  -- Indication: For Supplements    folic acid  --  by mouth once a day  -- Indication: For Supplements

## 2017-04-21 NOTE — DISCHARGE NOTE ADULT - HOSPITAL COURSE
82 year old w/h/o  HTN, A Fib on coumadin, Prostate Ca s/p radiation and sciatica presents to ED with frequent falls and severe lumbar back Pain.   Admitted for  Intractable back pain.    - MRI LS noted= The conus medullaris is normal in signal and position. There is no   abnormal thickening of the cauda equina nerve roots.  Lumbar lordosis is maintained. Minimal grade 1 retrolisthesis L1 on L2,   L2 on L3, and L3 on L4. Vertebral body heights are preserved.   Degenerative Modic endplate signal changes and irregularities are seen at   every level. There is multilevel disc desiccation with loss of space   heights of the lumbar spine.  L1-L2: Diffuse disc bulge and posterior osteophytic ridging and mild   facet arthropathy contributes to mild central canal stenosis. No   significant neuroforaminal narrowing.  L2-L3: Diffuse disc bulge with posterior osteophytic ridging, facet   arthropathy and ligamenta flava thickening contributes to mild central   canal stenosis. No significant neuroforaminal narrowing.  L3-L4: Diffuse disc bulge with posterior osteophytic ridging, facet   arthropathy and ligamenta flava thickening contributes to mild right   neural foraminal narrowing. No significant central canal stenosis.  L4-L5: Small posterior disc osteophyte complex, facet arthropathy and   ligamenta flava thickening contributes to mild right neural foraminal   narrowing. No significant central canal stenosis.  L5-S1: Posterior disc bulge with left subarticular annular fissure, and   facet arthropathy contribute to moderate-severe right and mild left   neural foraminal  The paraspinal muscles are unremarkable. Partially visualized right renal   cyst.    - no neurological deficits  -given  decadron x3 doses  - pain control- morphine, percocet PRN, lyrica, flexiril  - PT eval =KEENAN  - ortho consulted and agree with the above. medrol dose geetha upon d/c    INR=3.13 ON 4/21/17, Holding coumadin dose tonight  recheck INR on 4/22/17 and redose accordingly as per MD at Rehab  monitor BP, lower side. discontinued lisinopril, adjust BP accordingly  d/c planning to KEENAN . discussed with pt and pt's spouse at bedside, son over phone

## 2017-04-21 NOTE — DISCHARGE NOTE ADULT - SECONDARY DIAGNOSIS.
Obesity (BMI 30-39.9) Chronic atrial fibrillation Essential hypertension Benign prostatic hyperplasia with lower urinary tract symptoms, unspecified morphology Type 2 diabetes mellitus with hyperglycemia, without long-term current use of insulin Supratherapeutic INR

## 2017-04-21 NOTE — DISCHARGE NOTE ADULT - MEDICATION SUMMARY - MEDICATIONS TO CHANGE
I will SWITCH the dose or number of times a day I take the medications listed below when I get home from the hospital:    warfarin 5 mg oral tablet  --  by mouth 3 times a week    warfarin 6 mg oral tablet  -- 1 tab(s) by mouth 3 times a week

## 2017-04-21 NOTE — DISCHARGE NOTE ADULT - PLAN OF CARE
improve sciatica and LLE radiculopathy  pain control, physical therapy at [facility], medrol dosepak as per ortho recommendations life style modifications INR=3.13 ON 4/21/17, Holding coumadin dose tonight  recheck INR on 4/22/17 and redose accordingly as per MD at Rehab newly diagnosed, monitor finger sticks  continue with sliding scale for now, likely can be switched to oral upon d/c from rehab continue with home meds monitor BP, lower end. discontinued lisinopril, adjust BP accordingly

## 2017-04-21 NOTE — DISCHARGE NOTE ADULT - CARE PROVIDER_API CALL
Sharla Tejada (DO), Orthopaedic Surgery [facility]  [address] [place] [address]  Phone: [phone]  Fax: [phone]    MARILIA adair  Phone: (   )    -  Fax: (   )    -

## 2017-04-21 NOTE — DISCHARGE NOTE ADULT - CARE PLAN
Principal Discharge DX:	Intractable back pain  Goal:	improve  Instructions for follow-up, activity and diet:	sciatica and LLE radiculopathy  pain control, physical therapy at [facility], medrol dosepak as per ortho recommendations  Secondary Diagnosis:	Obesity (BMI 30-39.9)  Instructions for follow-up, activity and diet:	life style modifications  Secondary Diagnosis:	Chronic atrial fibrillation  Instructions for follow-up, activity and diet:	INR=3.13 ON 4/21/17, Holding coumadin dose tonight  recheck INR on 4/22/17 and redose accordingly as per MD at Rehab  Secondary Diagnosis:	Essential hypertension  Instructions for follow-up, activity and diet:	monitor BP, lower end. discontinued lisinopril, adjust BP accordingly  Secondary Diagnosis:	Benign prostatic hyperplasia with lower urinary tract symptoms, unspecified morphology  Instructions for follow-up, activity and diet:	continue with home meds  Secondary Diagnosis:	Type 2 diabetes mellitus with hyperglycemia, without long-term current use of insulin  Instructions for follow-up, activity and diet:	newly diagnosed, monitor finger sticks  continue with sliding scale for now, likely can be switched to oral upon d/c from rehab  Secondary Diagnosis:	Supratherapeutic INR  Instructions for follow-up, activity and diet:	INR=3.13 ON 4/21/17, Holding coumadin dose tonight  recheck INR on 4/22/17 and redose accordingly as per MD at Rehab Principal Discharge DX:	Intractable back pain  Goal:	improve  Instructions for follow-up, activity and diet:	sciatica and LLE radiculopathy  pain control, physical therapy at [facility], medrol dosepak as per ortho recommendations  Secondary Diagnosis:	Obesity (BMI 30-39.9)  Instructions for follow-up, activity and diet:	life style modifications  Secondary Diagnosis:	Chronic atrial fibrillation  Instructions for follow-up, activity and diet:	INR=3.13 ON 4/21/17, Holding coumadin dose tonight  recheck INR on 4/22/17 and redose accordingly as per MD at Rehab  Secondary Diagnosis:	Essential hypertension  Instructions for follow-up, activity and diet:	monitor BP, lower end. discontinued lisinopril, adjust BP accordingly  Secondary Diagnosis:	Benign prostatic hyperplasia with lower urinary tract symptoms, unspecified morphology  Instructions for follow-up, activity and diet:	continue with home meds  Secondary Diagnosis:	Type 2 diabetes mellitus with hyperglycemia, without long-term current use of insulin  Instructions for follow-up, activity and diet:	newly diagnosed, monitor finger sticks  continue with sliding scale for now, likely can be switched to oral upon d/c from rehab  Secondary Diagnosis:	Supratherapeutic INR  Instructions for follow-up, activity and diet:	INR=3.13 ON 4/21/17, Holding coumadin dose tonight  recheck INR on 4/22/17 and redose accordingly as per MD at Rehab

## 2017-04-24 LAB
CULTURE RESULTS: SIGNIFICANT CHANGE UP
CULTURE RESULTS: SIGNIFICANT CHANGE UP
SPECIMEN SOURCE: SIGNIFICANT CHANGE UP
SPECIMEN SOURCE: SIGNIFICANT CHANGE UP

## 2017-04-26 DIAGNOSIS — Z86.718 PERSONAL HISTORY OF OTHER VENOUS THROMBOSIS AND EMBOLISM: ICD-10-CM

## 2017-04-26 DIAGNOSIS — R05 COUGH: ICD-10-CM

## 2017-04-26 DIAGNOSIS — Z92.3 PERSONAL HISTORY OF IRRADIATION: ICD-10-CM

## 2017-04-26 DIAGNOSIS — Z79.01 LONG TERM (CURRENT) USE OF ANTICOAGULANTS: ICD-10-CM

## 2017-04-26 DIAGNOSIS — Z85.46 PERSONAL HISTORY OF MALIGNANT NEOPLASM OF PROSTATE: ICD-10-CM

## 2017-04-26 DIAGNOSIS — I10 ESSENTIAL (PRIMARY) HYPERTENSION: ICD-10-CM

## 2017-04-26 DIAGNOSIS — I48.2 CHRONIC ATRIAL FIBRILLATION: ICD-10-CM

## 2017-04-26 DIAGNOSIS — E66.9 OBESITY, UNSPECIFIED: ICD-10-CM

## 2017-04-26 DIAGNOSIS — Z91.81 HISTORY OF FALLING: ICD-10-CM

## 2017-04-26 DIAGNOSIS — N28.1 CYST OF KIDNEY, ACQUIRED: ICD-10-CM

## 2017-04-26 DIAGNOSIS — N40.1 BENIGN PROSTATIC HYPERPLASIA WITH LOWER URINARY TRACT SYMPTOMS: ICD-10-CM

## 2017-04-26 DIAGNOSIS — M54.32 SCIATICA, LEFT SIDE: ICD-10-CM

## 2017-04-26 DIAGNOSIS — Z86.711 PERSONAL HISTORY OF PULMONARY EMBOLISM: ICD-10-CM

## 2017-04-26 DIAGNOSIS — M54.16 RADICULOPATHY, LUMBAR REGION: ICD-10-CM

## 2017-04-26 DIAGNOSIS — Z88.6 ALLERGY STATUS TO ANALGESIC AGENT: ICD-10-CM

## 2017-04-26 DIAGNOSIS — E11.65 TYPE 2 DIABETES MELLITUS WITH HYPERGLYCEMIA: ICD-10-CM

## 2017-05-24 NOTE — PATIENT PROFILE ADULT. - NUTRITION PROFILE
calling, states patient is scheduled for surgery tomorrow and is running a fever last pm and again this am. Would like Dr Shaw or nurse to call to discuss, also states needs name of surgeon as she has lost phone number for them. Please call to advise.    no indicators present

## 2018-02-26 ENCOUNTER — EMERGENCY (EMERGENCY)
Facility: HOSPITAL | Age: 83
LOS: 0 days | Discharge: HOME | End: 2018-02-26
Attending: EMERGENCY MEDICINE
Payer: COMMERCIAL

## 2018-02-26 VITALS
RESPIRATION RATE: 17 BRPM | HEART RATE: 87 BPM | TEMPERATURE: 98 F | DIASTOLIC BLOOD PRESSURE: 54 MMHG | OXYGEN SATURATION: 99 % | WEIGHT: 182.98 LBS | SYSTOLIC BLOOD PRESSURE: 114 MMHG | HEIGHT: 63 IN

## 2018-02-26 VITALS
TEMPERATURE: 98 F | SYSTOLIC BLOOD PRESSURE: 103 MMHG | RESPIRATION RATE: 17 BRPM | DIASTOLIC BLOOD PRESSURE: 64 MMHG | OXYGEN SATURATION: 99 % | HEART RATE: 79 BPM

## 2018-02-26 DIAGNOSIS — R73.03 PREDIABETES: ICD-10-CM

## 2018-02-26 DIAGNOSIS — Z85.46 PERSONAL HISTORY OF MALIGNANT NEOPLASM OF PROSTATE: ICD-10-CM

## 2018-02-26 DIAGNOSIS — S00.81XA ABRASION OF OTHER PART OF HEAD, INITIAL ENCOUNTER: ICD-10-CM
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
## 2018-02-26 DIAGNOSIS — I48.91 UNSPECIFIED ATRIAL FIBRILLATION: ICD-10-CM

## 2018-02-26 DIAGNOSIS — I10 ESSENTIAL (PRIMARY) HYPERTENSION: ICD-10-CM

## 2018-02-26 DIAGNOSIS — H26.9 UNSPECIFIED CATARACT: ICD-10-CM

## 2018-02-26 LAB
ALBUMIN SERPL ELPH-MCNC: 3.7 G/DL — SIGNIFICANT CHANGE UP (ref 3.3–5)
ALP SERPL-CCNC: 69 U/L — SIGNIFICANT CHANGE UP (ref 40–120)
ALT FLD-CCNC: 33 U/L — SIGNIFICANT CHANGE UP (ref 12–78)
ANION GAP SERPL CALC-SCNC: 8 MMOL/L — SIGNIFICANT CHANGE UP (ref 5–17)
APTT BLD: 46.4 SEC — HIGH (ref 27.5–37.4)
AST SERPL-CCNC: 35 U/L — SIGNIFICANT CHANGE UP (ref 15–37)
BASOPHILS # BLD AUTO: 0.03 K/UL — SIGNIFICANT CHANGE UP (ref 0–0.2)
BASOPHILS NFR BLD AUTO: 0.5 % — SIGNIFICANT CHANGE UP (ref 0–2)
BILIRUB SERPL-MCNC: 0.4 MG/DL — SIGNIFICANT CHANGE UP (ref 0.2–1.2)
BUN SERPL-MCNC: 25 MG/DL — HIGH (ref 7–23)
CALCIUM SERPL-MCNC: 8.8 MG/DL — SIGNIFICANT CHANGE UP (ref 8.5–10.1)
CHLORIDE SERPL-SCNC: 104 MMOL/L — SIGNIFICANT CHANGE UP (ref 96–108)
CO2 SERPL-SCNC: 28 MMOL/L — SIGNIFICANT CHANGE UP (ref 22–31)
CREAT SERPL-MCNC: 1.39 MG/DL — HIGH (ref 0.5–1.3)
EOSINOPHIL # BLD AUTO: 0.24 K/UL — SIGNIFICANT CHANGE UP (ref 0–0.5)
EOSINOPHIL NFR BLD AUTO: 3.6 % — SIGNIFICANT CHANGE UP (ref 0–6)
GLUCOSE SERPL-MCNC: 148 MG/DL — HIGH (ref 70–99)
HCT VFR BLD CALC: 41.6 % — SIGNIFICANT CHANGE UP (ref 39–50)
HGB BLD-MCNC: 14.6 G/DL — SIGNIFICANT CHANGE UP (ref 13–17)
IMM GRANULOCYTES NFR BLD AUTO: 0.2 % — SIGNIFICANT CHANGE UP (ref 0–1.5)
INR BLD: 2.61 RATIO — HIGH (ref 0.88–1.16)
LYMPHOCYTES # BLD AUTO: 2.22 K/UL — SIGNIFICANT CHANGE UP (ref 1–3.3)
LYMPHOCYTES # BLD AUTO: 33.4 % — SIGNIFICANT CHANGE UP (ref 13–44)
MCHC RBC-ENTMCNC: 32.3 PG — SIGNIFICANT CHANGE UP (ref 27–34)
MCHC RBC-ENTMCNC: 35.1 GM/DL — SIGNIFICANT CHANGE UP (ref 32–36)
MCV RBC AUTO: 92 FL — SIGNIFICANT CHANGE UP (ref 80–100)
MONOCYTES # BLD AUTO: 0.67 K/UL — SIGNIFICANT CHANGE UP (ref 0–0.9)
MONOCYTES NFR BLD AUTO: 10.1 % — SIGNIFICANT CHANGE UP (ref 2–14)
NEUTROPHILS # BLD AUTO: 3.47 K/UL — SIGNIFICANT CHANGE UP (ref 1.8–7.4)
NEUTROPHILS NFR BLD AUTO: 52.2 % — SIGNIFICANT CHANGE UP (ref 43–77)
NRBC # BLD: 0 /100 WBCS — SIGNIFICANT CHANGE UP (ref 0–0)
PLATELET # BLD AUTO: 167 K/UL — SIGNIFICANT CHANGE UP (ref 150–400)
POTASSIUM SERPL-MCNC: 4.7 MMOL/L — SIGNIFICANT CHANGE UP (ref 3.5–5.3)
POTASSIUM SERPL-SCNC: 4.7 MMOL/L — SIGNIFICANT CHANGE UP (ref 3.5–5.3)
PROT SERPL-MCNC: 7.5 GM/DL — SIGNIFICANT CHANGE UP (ref 6–8.3)
PROTHROM AB SERPL-ACNC: 29 SEC — HIGH (ref 9.8–12.7)
RBC # BLD: 4.52 M/UL — SIGNIFICANT CHANGE UP (ref 4.2–5.8)
RBC # FLD: 13.1 % — SIGNIFICANT CHANGE UP (ref 10.3–14.5)
SODIUM SERPL-SCNC: 140 MMOL/L — SIGNIFICANT CHANGE UP (ref 135–145)
WBC # BLD: 6.64 K/UL — SIGNIFICANT CHANGE UP (ref 3.8–10.5)
WBC # FLD AUTO: 6.64 K/UL — SIGNIFICANT CHANGE UP (ref 3.8–10.5)

## 2018-02-26 PROCEDURE — 99285 EMERGENCY DEPT VISIT HI MDM: CPT

## 2018-02-26 PROCEDURE — 76377 3D RENDER W/INTRP POSTPROCES: CPT | Mod: 26,76

## 2018-02-26 PROCEDURE — 70450 CT HEAD/BRAIN W/O DYE: CPT | Mod: 26

## 2018-02-26 PROCEDURE — 70486 CT MAXILLOFACIAL W/O DYE: CPT | Mod: 26

## 2018-02-26 PROCEDURE — 72125 CT NECK SPINE W/O DYE: CPT | Mod: 26

## 2018-02-26 NOTE — ED PROVIDER NOTE - MEDICAL DECISION MAKING DETAILS
pt with remote CVA otherwise well appearing R forehead abrasion to dc with follow up with PMD in 2-3 days.

## 2018-02-26 NOTE — ED PROVIDER NOTE - NEUROLOGICAL, MLM
Alert and oriented, no focal deficits, no motor or sensory deficits. CN II  -XII intact no deficits, NIHSS 0

## 2018-02-26 NOTE — ED ADULT NURSE NOTE - OBJECTIVE STATEMENT
received er bed 26 c/o head injury with abrasion r upper forehead and r orbital ecchymosis s/p fall on saturday in backyard denies loc states was dizzy prior to fall sent by pmd r/o brain bleed pt on warfarin a&ox3 denies dizziness at present denies n/v states pain r upper forehead only upon palpation

## 2018-02-26 NOTE — ED PROVIDER NOTE - OBJECTIVE STATEMENT
83 year old male with PMH of afib on coumadin, Cervical disc disease with previous disease, HTN, Prostate CA presenting to ED due to fall 2 days ago otherwise due to being on coumadin was sent in for CT head. Denies any LOC, fall was mechanical and pt falling on face/nose area with injury.

## 2018-02-26 NOTE — ED ADULT TRIAGE NOTE - CHIEF COMPLAINT QUOTE
Fall, abrasion to right side of head nose bruise and eye swelling He felt dizzy prior to the fall. He fell Saturday night . He went to see doctor today and referred to ed. He is on Coumadin

## 2018-02-26 NOTE — ED PROVIDER NOTE - ENMT, MLM
Airway patent, Nasal mucosa clear. Mouth with normal mucosa. Throat has no vesicles, no oropharyngeal exudates and uvula is midline. R side eye lid area with echymosis, bruising, abrasion to R forehead.

## 2018-04-17 ENCOUNTER — APPOINTMENT (OUTPATIENT)
Dept: VASCULAR SURGERY | Facility: CLINIC | Age: 83
End: 2018-04-17
Payer: MEDICARE

## 2018-04-17 VITALS
TEMPERATURE: 97.7 F | BODY MASS INDEX: 31.89 KG/M2 | SYSTOLIC BLOOD PRESSURE: 126 MMHG | DIASTOLIC BLOOD PRESSURE: 70 MMHG | HEART RATE: 91 BPM | HEIGHT: 63 IN | WEIGHT: 180 LBS

## 2018-04-17 DIAGNOSIS — Z87.39 PERSONAL HISTORY OF OTHER DISEASES OF THE MUSCULOSKELETAL SYSTEM AND CONNECTIVE TISSUE: ICD-10-CM

## 2018-04-17 DIAGNOSIS — Z86.39 PERSONAL HISTORY OF OTHER ENDOCRINE, NUTRITIONAL AND METABOLIC DISEASE: ICD-10-CM

## 2018-04-17 DIAGNOSIS — Z86.79 PERSONAL HISTORY OF OTHER DISEASES OF THE CIRCULATORY SYSTEM: ICD-10-CM

## 2018-04-17 DIAGNOSIS — Z85.46 PERSONAL HISTORY OF MALIGNANT NEOPLASM OF PROSTATE: ICD-10-CM

## 2018-04-17 PROCEDURE — 93880 EXTRACRANIAL BILAT STUDY: CPT

## 2018-04-17 PROCEDURE — 99202 OFFICE O/P NEW SF 15 MIN: CPT

## 2018-04-23 ENCOUNTER — OUTPATIENT (OUTPATIENT)
Dept: OUTPATIENT SERVICES | Facility: HOSPITAL | Age: 83
LOS: 1 days | End: 2018-04-23
Payer: COMMERCIAL

## 2018-04-23 ENCOUNTER — APPOINTMENT (OUTPATIENT)
Dept: CT IMAGING | Facility: IMAGING CENTER | Age: 83
End: 2018-04-23
Payer: MEDICARE

## 2018-04-23 DIAGNOSIS — Z00.8 ENCOUNTER FOR OTHER GENERAL EXAMINATION: ICD-10-CM

## 2018-04-23 PROCEDURE — 70498 CT ANGIOGRAPHY NECK: CPT

## 2018-04-23 PROCEDURE — 70496 CT ANGIOGRAPHY HEAD: CPT | Mod: 26

## 2018-04-23 PROCEDURE — 82565 ASSAY OF CREATININE: CPT

## 2018-04-23 PROCEDURE — 70496 CT ANGIOGRAPHY HEAD: CPT

## 2018-04-23 PROCEDURE — 70498 CT ANGIOGRAPHY NECK: CPT | Mod: 26

## 2018-04-25 ENCOUNTER — RESULT REVIEW (OUTPATIENT)
Age: 83
End: 2018-04-25

## 2019-09-23 ENCOUNTER — EMERGENCY (EMERGENCY)
Facility: HOSPITAL | Age: 84
LOS: 0 days | Discharge: HOME | End: 2019-09-24
Attending: EMERGENCY MEDICINE
Payer: COMMERCIAL

## 2019-09-23 VITALS
OXYGEN SATURATION: 98 % | WEIGHT: 179.9 LBS | TEMPERATURE: 99 F | DIASTOLIC BLOOD PRESSURE: 82 MMHG | HEIGHT: 64 IN | RESPIRATION RATE: 16 BRPM | SYSTOLIC BLOOD PRESSURE: 136 MMHG | HEART RATE: 90 BPM

## 2019-09-23 DIAGNOSIS — Z79.4 LONG TERM (CURRENT) USE OF INSULIN: ICD-10-CM
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
## 2019-09-23 DIAGNOSIS — R51 HEADACHE: ICD-10-CM

## 2019-09-23 DIAGNOSIS — S01.01XA LACERATION WITHOUT FOREIGN BODY OF SCALP, INITIAL ENCOUNTER: ICD-10-CM

## 2019-09-23 DIAGNOSIS — Z88.8 ALLERGY STATUS TO OTHER DRUGS, MEDICAMENTS AND BIOLOGICAL SUBSTANCES STATUS: ICD-10-CM
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
## 2019-09-23 DIAGNOSIS — S09.90XA UNSPECIFIED INJURY OF HEAD, INITIAL ENCOUNTER: ICD-10-CM

## 2019-09-23 PROCEDURE — 99284 EMERGENCY DEPT VISIT MOD MDM: CPT

## 2019-09-23 PROCEDURE — 72125 CT NECK SPINE W/O DYE: CPT | Mod: 26

## 2019-09-23 PROCEDURE — 70450 CT HEAD/BRAIN W/O DYE: CPT | Mod: 26

## 2019-09-23 NOTE — ED PROVIDER NOTE - CLINICAL SUMMARY MEDICAL DECISION MAKING FREE TEXT BOX
Pt w above dx, labs & imaging neg for acute pathology.  WOund thoroughly cleansed, no need for sutures, family instructed on carte.  Discussed results and outcome of testing with the patient, given copy as well.  Patient advised to please follow up with their primary care doctor within the next 24 hours and return to the Emergency Department for worsening symptoms or any other concerns.  Patient advised that their doctor may call  to follow up on the specific results of the tests performed today in the emergency department.

## 2019-09-23 NOTE — ED ADULT TRIAGE NOTE - CHIEF COMPLAINT QUOTE
pt fell while getting into bed and hit top of head on the nightstand. denies LOC. + laceration to top of head. pt on coumadin. pt fell again while going to car because he states that his right foot was hurting him also. bleeding controlled at this time.

## 2019-09-23 NOTE — ED PROVIDER NOTE - PATIENT PORTAL LINK FT
You can access the FollowMyHealth Patient Portal offered by [facility] by registering at the following website: [email or web address]. By joining [employer]’s FollowMyHealth portal, you will also be able to view your health information using other applications (apps) compatible with our system.

## 2019-09-23 NOTE — ED PROVIDER NOTE - OBJECTIVE STATEMENT
Pertinent PMH/PSH/FHx/SHx and Review of Systems contained within:    83yo M w PMH of Afib on coumadin, prostate ca, HTN, spinal stenosis presents to ED for eval s/p head injury.  Pt was trying to get into bed, when the bed moved and pt his the top of his head.  Denies LOC, other injuries.  Pt also has hx of unsteady gait due to spinal stenosis, fell while coming to ED, no head injury at that time.  Pt just resumed coumadin, after d/c med for 5d to have colonoscopy done.    No fever/chills, No photophobia/eye pain/changes in vision, No ear pain/sore throat/dysphagia, No chest pain/palpitations, no SOB/cough/wheeze/stridor, No abdominal pain, No neck/back pain, no rash, no changes in neurological status/function.

## 2019-09-23 NOTE — ED PROVIDER NOTE - PHYSICAL EXAMINATION
Gen: Alert, NAD, GCS 15  Head: !cm superficial laceration to R side top of scalp, no active bleeding, EOMI, normal lids/conjunctiva  ENT: normal hearing, patent oropharynx, MMM  Neck: supple, no tenderness/meningismus, FROM, Trachea midline  Pulm: Bilateral clear BS, normal resp effort, no wheeze/stridor/retractions  CV: Afib, +dist pulses  Abd: soft, NT/ND, +BS, no guarding/rebound tenderness  Mskel: no edema/erythema/cyanosis, FROM in all ext  Skin: no rash  Neuro: no sensory/motor deficits, CN 2-12 intact

## 2019-09-24 LAB
ALBUMIN SERPL ELPH-MCNC: 3.3 G/DL — SIGNIFICANT CHANGE UP (ref 3.3–5)
ALP SERPL-CCNC: 63 U/L — SIGNIFICANT CHANGE UP (ref 40–120)
ALT FLD-CCNC: 40 U/L — SIGNIFICANT CHANGE UP (ref 12–78)
ANION GAP SERPL CALC-SCNC: 6 MMOL/L — SIGNIFICANT CHANGE UP (ref 5–17)
APTT BLD: 27.8 SEC — SIGNIFICANT CHANGE UP (ref 27.5–36.3)
AST SERPL-CCNC: 32 U/L — SIGNIFICANT CHANGE UP (ref 15–37)
BASOPHILS # BLD AUTO: 0.01 K/UL — SIGNIFICANT CHANGE UP (ref 0–0.2)
BASOPHILS NFR BLD AUTO: 0.1 % — SIGNIFICANT CHANGE UP (ref 0–2)
BILIRUB SERPL-MCNC: 0.4 MG/DL — SIGNIFICANT CHANGE UP (ref 0.2–1.2)
BUN SERPL-MCNC: 19 MG/DL — SIGNIFICANT CHANGE UP (ref 7–23)
CALCIUM SERPL-MCNC: 8.9 MG/DL — SIGNIFICANT CHANGE UP (ref 8.5–10.1)
CHLORIDE SERPL-SCNC: 107 MMOL/L — SIGNIFICANT CHANGE UP (ref 96–108)
CO2 SERPL-SCNC: 26 MMOL/L — SIGNIFICANT CHANGE UP (ref 22–31)
CREAT SERPL-MCNC: 1.06 MG/DL — SIGNIFICANT CHANGE UP (ref 0.5–1.3)
EOSINOPHIL # BLD AUTO: 0.2 K/UL — SIGNIFICANT CHANGE UP (ref 0–0.5)
EOSINOPHIL NFR BLD AUTO: 3 % — SIGNIFICANT CHANGE UP (ref 0–6)
GLUCOSE SERPL-MCNC: 145 MG/DL — HIGH (ref 70–99)
HCT VFR BLD CALC: 37.5 % — LOW (ref 39–50)
HGB BLD-MCNC: 12.9 G/DL — LOW (ref 13–17)
IMM GRANULOCYTES NFR BLD AUTO: 0.3 % — SIGNIFICANT CHANGE UP (ref 0–1.5)
INR BLD: 1.11 RATIO — SIGNIFICANT CHANGE UP (ref 0.88–1.16)
LYMPHOCYTES # BLD AUTO: 1.84 K/UL — SIGNIFICANT CHANGE UP (ref 1–3.3)
LYMPHOCYTES # BLD AUTO: 27.5 % — SIGNIFICANT CHANGE UP (ref 13–44)
MCHC RBC-ENTMCNC: 31.4 PG — SIGNIFICANT CHANGE UP (ref 27–34)
MCHC RBC-ENTMCNC: 34.4 GM/DL — SIGNIFICANT CHANGE UP (ref 32–36)
MCV RBC AUTO: 91.2 FL — SIGNIFICANT CHANGE UP (ref 80–100)
MONOCYTES # BLD AUTO: 0.58 K/UL — SIGNIFICANT CHANGE UP (ref 0–0.9)
MONOCYTES NFR BLD AUTO: 8.7 % — SIGNIFICANT CHANGE UP (ref 2–14)
NEUTROPHILS # BLD AUTO: 4.05 K/UL — SIGNIFICANT CHANGE UP (ref 1.8–7.4)
NEUTROPHILS NFR BLD AUTO: 60.4 % — SIGNIFICANT CHANGE UP (ref 43–77)
NRBC # BLD: 0 /100 WBCS — SIGNIFICANT CHANGE UP (ref 0–0)
PLATELET # BLD AUTO: 165 K/UL — SIGNIFICANT CHANGE UP (ref 150–400)
POTASSIUM SERPL-MCNC: 4 MMOL/L — SIGNIFICANT CHANGE UP (ref 3.5–5.3)
POTASSIUM SERPL-SCNC: 4 MMOL/L — SIGNIFICANT CHANGE UP (ref 3.5–5.3)
PROT SERPL-MCNC: 7.2 GM/DL — SIGNIFICANT CHANGE UP (ref 6–8.3)
PROTHROM AB SERPL-ACNC: 12.5 SEC — SIGNIFICANT CHANGE UP (ref 10–12.9)
RBC # BLD: 4.11 M/UL — LOW (ref 4.2–5.8)
RBC # FLD: 12.9 % — SIGNIFICANT CHANGE UP (ref 10.3–14.5)
SODIUM SERPL-SCNC: 139 MMOL/L — SIGNIFICANT CHANGE UP (ref 135–145)
WBC # BLD: 6.7 K/UL — SIGNIFICANT CHANGE UP (ref 3.8–10.5)
WBC # FLD AUTO: 6.7 K/UL — SIGNIFICANT CHANGE UP (ref 3.8–10.5)

## 2019-09-24 RX ORDER — BACITRACIN ZINC 500 UNIT/G
1 OINTMENT IN PACKET (EA) TOPICAL
Qty: 1 | Refills: 0
Start: 2019-09-24 | End: 2019-09-30

## 2019-09-24 NOTE — ED ADULT NURSE NOTE - OBJECTIVE STATEMENT
AO X3 , Accompanied by daughter and spouse to the ED  reported patient fell while getting into bed and hit top of head on the nightstand. denies LOC. + laceration to top of head. pt on coumadin. pt fell again while going to car because he states that his right foot was hurting him also. bleeding controlled at this time. Labs done , resulted CT head r/o bleed unremarkable

## 2019-09-24 NOTE — ED ADULT NURSE NOTE - NSIMPLEMENTINTERV_GEN_ALL_ED
Implemented All Fall with Harm Risk Interventions:  [place] to call system. Call bell, personal items and telephone within reach. Instruct patient to call for assistance. Room bathroom lighting operational. Non-slip footwear when patient is off stretcher. Physically safe environment: no spills, clutter or unnecessary equipment. Stretcher in lowest position, wheels locked, appropriate side rails in place. Provide visual cue, wrist band, yellow gown, etc. Monitor gait and stability. Monitor for mental status changes and reorient to person, place, and time. Review medications for side effects contributing to fall risk. Reinforce activity limits and safety measures with patient and family. Provide visual clues: red socks.

## 2020-10-19 NOTE — DISCHARGE NOTE ADULT - IMPORTANT THAT YOU TELL YOUR HEALTH CARE PROVIDER ABOUT ALL OTHER MEDICINES YOU ARE TAKING INCLUDING OVER-THE-COUNTER MEDICATIONS, HERBS, DIET SUPPLEMENTS, OR PRODUCTS CONTAINING VITAMIN K. CALL YOUR
Rescue dog    Increase your dose of losartan to a full pill (25mg) and come back for a pharmacy bp check in about a month    Alert me if you want to switch to vesicare (check out 's formulary)    Pap smear today            Preventive Health Recommendations  Female Ages 50 - 64    Yearly exam: See your health care provider every year in order to  o Review health changes.   o Discuss preventive care.    o Review your medicines if your doctor has prescribed any.      Get a Pap test every three years (unless you have an abnormal result and your provider advises testing more often).    If you get Pap tests with HPV test, you only need to test every 5 years, unless you have an abnormal result.     You do not need a Pap test if your uterus was removed (hysterectomy) and you have not had cancer.    You should be tested each year for STDs (sexually transmitted diseases) if you're at risk.     Have a mammogram every 1 to 2 years.    Have a colonoscopy at age 50, or have a yearly FIT test (stool test). These exams screen for colon cancer.      Have a cholesterol test every 5 years, or more often if advised.    Have a diabetes test (fasting glucose) every three years. If you are at risk for diabetes, you should have this test more often.     If you are at risk for osteoporosis (brittle bone disease), think about having a bone density scan (DEXA).    Shots: Get a flu shot each year. Get a tetanus shot every 10 years.    Nutrition:     Eat at least 5 servings of fruits and vegetables each day.    Eat whole-grain bread, whole-wheat pasta and brown rice instead of white grains and rice.    Get adequate Calcium and Vitamin D.     Lifestyle    Exercise at least 150 minutes a week (30 minutes a day, 5 days a week). This will help you control your weight and prevent disease.    Limit alcohol to one drink per day.    No smoking.     Wear sunscreen to prevent skin cancer.     See your dentist every six months for an exam and  cleaning.    See your eye doctor every 1 to 2 years.     Statement Selected

## 2022-07-24 ENCOUNTER — EMERGENCY (EMERGENCY)
Facility: HOSPITAL | Age: 87
LOS: 0 days | Discharge: HOME | End: 2022-07-24
Attending: STUDENT IN AN ORGANIZED HEALTH CARE EDUCATION/TRAINING PROGRAM

## 2022-07-24 VITALS
DIASTOLIC BLOOD PRESSURE: 73 MMHG | TEMPERATURE: 99 F | HEART RATE: 80 BPM | OXYGEN SATURATION: 98 % | SYSTOLIC BLOOD PRESSURE: 125 MMHG | RESPIRATION RATE: 18 BRPM

## 2022-07-24 VITALS
OXYGEN SATURATION: 97 % | TEMPERATURE: 99 F | HEART RATE: 76 BPM | RESPIRATION RATE: 18 BRPM | SYSTOLIC BLOOD PRESSURE: 113 MMHG | HEIGHT: 64 IN | DIASTOLIC BLOOD PRESSURE: 68 MMHG | WEIGHT: 169.98 LBS

## 2022-07-24 DIAGNOSIS — Z88.6 ALLERGY STATUS TO ANALGESIC AGENT: ICD-10-CM

## 2022-07-24 DIAGNOSIS — S00.83XA CONTUSION OF OTHER PART OF HEAD, INITIAL ENCOUNTER: ICD-10-CM
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
## 2022-07-24 DIAGNOSIS — Z79.01 LONG TERM (CURRENT) USE OF ANTICOAGULANTS: ICD-10-CM
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
## 2022-07-24 DIAGNOSIS — Z79.84 LONG TERM (CURRENT) USE OF ORAL HYPOGLYCEMIC DRUGS: ICD-10-CM

## 2022-07-24 DIAGNOSIS — R51.9 HEADACHE, UNSPECIFIED: ICD-10-CM

## 2022-07-24 DIAGNOSIS — S05.11XA CONTUSION OF EYEBALL AND ORBITAL TISSUES, RIGHT EYE, INITIAL ENCOUNTER: ICD-10-CM
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
## 2022-07-24 PROCEDURE — 72125 CT NECK SPINE W/O DYE: CPT | Mod: 26,MA

## 2022-07-24 PROCEDURE — 70450 CT HEAD/BRAIN W/O DYE: CPT | Mod: 26,MA

## 2022-07-24 PROCEDURE — 70486 CT MAXILLOFACIAL W/O DYE: CPT | Mod: 26,MA

## 2022-07-24 PROCEDURE — 99285 EMERGENCY DEPT VISIT HI MDM: CPT

## 2022-07-24 RX ORDER — ACETAMINOPHEN 500 MG
650 TABLET ORAL ONCE
Refills: 0 | Status: COMPLETED | OUTPATIENT
Start: 2022-07-24 | End: 2022-07-24

## 2022-07-24 RX ADMIN — Medication 650 MILLIGRAM(S): at 05:57

## 2022-07-24 NOTE — ED ADULT TRIAGE NOTE - CHIEF COMPLAINT QUOTE
Patient tripped on carpet at home today. Patient has bruise and swelling to right cheek. Denies LOC. Pmh htn, dm, dementia. Patient on Xarelto.

## 2022-07-24 NOTE — ED PROVIDER NOTE - PATIENT PORTAL LINK FT
You can access the FollowMyHealth Patient Portal offered by [facility] by registering at the following website: [email or web address]. By joining [email or web address]’s FollowMyHealth portal, you will also be able to view your health information using other applications (apps) compatible with our system.

## 2022-07-24 NOTE — ED PROVIDER NOTE - OBJECTIVE STATEMENT
88 y/o M on raven presents to the ED s/p fall. Patient lost his balance while walking at home and hit his head on the ground. Has swelling and bruising to the R sided face. Reports mild headache. No nausea or vomiting. Is at his baseline otherwise. States he was feeling fine prior to falling and that is was accidental.

## 2022-07-24 NOTE — ED ADULT NURSE NOTE - NSIMPLEMENTINTERV_GEN_ALL_ED
Implemented All Fall with Harm Risk Interventions:  [place] to call system. Call bell, personal items and telephone within reach. Instruct patient to call for assistance. Room bathroom lighting operational. Non-slip footwear when patient is off stretcher. Physically safe environment: no spills, clutter or unnecessary equipment. Stretcher in lowest position, wheels locked, appropriate side rails in place. Provide visual cue, wrist band, yellow gown, etc. Monitor gait and stability. Monitor for mental status changes and reorient to person, place, and time. Review medications for side effects contributing to fall risk. Reinforce activity limits and safety measures with patient and family. Provide visual clues: red socks.

## 2022-07-24 NOTE — ED ADULT NURSE NOTE - CAS TRG GENERAL AIRWAY, MLM
LABS:                        15.8   14.4  )-----------( 293      ( 26 Feb 2018 06:07 )             50.6     02-26    139  |  99  |  23  ----------------------------<  137<H>  5.1   |  29  |  0.69    Ca    8.5      26 Feb 2018 08:15  Mg     2.0     02-26    TPro  8.0  /  Alb  3.8  /  TBili  0.3  /  DBili  x   /  AST  14  /  ALT  12  /  AlkPhos  86  02-26    PT/INR - ( 26 Feb 2018 06:07 )   PT: 11.3 sec;   INR: 1.02          PTT - ( 26 Feb 2018 06:07 )  PTT:27.8 sec      RADIOLOGY & ADDITIONAL STUDIES:  FINDINGS:   Liver: Diffuse increased parenchymal echogenicity, in keeping with   steatosis. There is no focal parenchymal abnormality. Liver span is   normal, measuring up to 17.7 cm in craniocaudal dimension.    Intrahepatic ducts: Not dilated.    Common bile duct: Normal diameter, measuring 0.6 cm.    Gallbladder: Two mobile shadowing gallstones are seen in the fundus,   approximately 1.0 cm in size. No wall thickening or pericholecystic   fluid.  Negative sonographic Enrique's sign.    Pancreas: The visualized portions are of uniform echotexture and   unremarkable in appearance.      Abdominal aorta: The visualized portions were unremarkable.    Inferior vena cava: The visualized portions were normal in appearance.    Right kidney: Normal echogenicity. No focal lesions. Length of 11.9 cm.    Ascites: None in the right upper quadrant      IMPRESSION:  1.  Cholelithiasis without secondary sonographic signs of acute   cholecystitis.    2.  Hepatic steatosis. Patent

## 2022-07-24 NOTE — ED PROVIDER NOTE - CLINICAL SUMMARY MEDICAL DECISION MAKING FREE TEXT BOX
88 y/o M presenting to the ED s/p fall. Vitals stable. Patient has mild swelling and ecchymosis to R orbit. He is on xarelto. Will obtain CT head/max face/ c-spine in setting of trauma. Reassess.

## 2022-07-24 NOTE — ED ADULT NURSE NOTE - OBJECTIVE STATEMENT
per son-in-law,  pt tripped on carpet while walking around the bed.  pt has ecchymosis/swelling to R-eye.  pt ambulates with a walker

## 2022-07-24 NOTE — ED PROVIDER NOTE - NSFOLLOWUPINSTRUCTIONS_ED_ALL_ED_FT
You were seen in the ED after a fall.  Your Ct scan did not show any acute findings.    Follow up with your primary care doctors.    WHAT YOU NEED TO KNOW:    Fall prevention includes ways to make your home and other areas safer. It also includes ways you can move more carefully to prevent a fall. Health conditions that cause changes in your blood pressure, vision, or muscle strength and coordination may increase your risk for falls. Medicines may also increase your risk for falls if they make you dizzy, weak, or sleepy.     DISCHARGE INSTRUCTIONS:    Call 911 or have someone else call if:     You have fallen and are unconscious.      You have fallen and cannot move part of your body.    Contact your healthcare provider if:     You have fallen and have pain or a headache.      You have questions or concerns about your condition or care.    Fall prevention tips:     Stand or sit up slowly. This may help you keep your balance and prevent falls.      Use assistive devices as directed. Your healthcare provider may suggest that you use a cane or walker to help you keep your balance. You may need to have grab bars put in your bathroom near the toilet or in the shower.      Wear shoes that fit well and have soles that . Wear shoes both inside and outside. Use slippers with good . Do not wear shoes with high heels.      Wear a personal alarm. This is a device that allows you to call 911 if you fall and need help. Ask your healthcare provider for more information.      Stay active. Exercise can help strengthen your muscles and improve your balance. Your healthcare provider may recommend water aerobics or walking. He or she may also recommend physical therapy to improve your coordination. Never start an exercise program without talking to your healthcare provider first.       Manage your medical conditions. Keep all appointments with your healthcare providers. Visit your eye doctor as directed.    Home safety tips:     Add items to prevent falls in the bathroom. Put nonslip strips on your bath or shower floor to prevent you from slipping. Use a bath mat if you do not have carpet in the bathroom. This will prevent you from falling when you step out of the bath or shower. Use a shower seat so you do not need to stand while you shower. Sit on the toilet or a chair in your bathroom to dry yourself and put on clothing. This will prevent you from losing your balance from drying or dressing yourself while you are standing.       Keep paths clear. Remove books, shoes, and other objects from walkways and stairs. Place cords for telephones and lamps out of the way so that you do not need to walk over them. Tape them down if you cannot move them. Remove small rugs. If you cannot remove a rug, secure it with double-sided tape. This will prevent you from tripping.       Install bright lights in your home. Use night lights to help light paths to the bathroom or kitchen. Always turn on the light before you start walking.      Keep items you use often on shelves within reach. Do not use a step stool to help you reach an item.      Paint or place reflective tape on the edges of your stairs. This will help you see the stairs better.    Follow up with your healthcare provider as directed: Write down your questions so you remember to ask them during your visits.

## 2022-07-24 NOTE — ED PROVIDER NOTE - PHYSICAL EXAMINATION
GENERAL: Awake, alert, NAD  HEENT: moist mucous membranes, PERRL, EOMI, +periorbital swelling and ecchymosis to R orbit  LUNGS: CTAB, no wheezes or crackles   CARDIAC: RRR, no m/r/g  ABDOMEN: Soft, normal BS, non tender, non distended, no rebound, no guarding  BACK: No midline spinal tenderness, no CVA tenderness  EXT: No edema, no calf tenderness, 2+ DP pulses bilaterally, no deformities.  NEURO: A&Ox3. Moving all extremities.  SKIN: Warm and dry. No rash.  PSYCH: Normal affect.

## 2022-09-04 ENCOUNTER — EMERGENCY (EMERGENCY)
Facility: HOSPITAL | Age: 87
LOS: 0 days | Discharge: HOME | End: 2022-09-04
Attending: STUDENT IN AN ORGANIZED HEALTH CARE EDUCATION/TRAINING PROGRAM

## 2022-09-04 VITALS
TEMPERATURE: 98 F | RESPIRATION RATE: 19 BRPM | HEART RATE: 79 BPM | OXYGEN SATURATION: 98 % | HEIGHT: 64 IN | SYSTOLIC BLOOD PRESSURE: 120 MMHG | WEIGHT: 171.96 LBS | DIASTOLIC BLOOD PRESSURE: 78 MMHG

## 2022-09-04 VITALS
RESPIRATION RATE: 19 BRPM | DIASTOLIC BLOOD PRESSURE: 75 MMHG | OXYGEN SATURATION: 98 % | SYSTOLIC BLOOD PRESSURE: 121 MMHG | HEART RATE: 78 BPM
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
## 2022-09-04 DIAGNOSIS — C61 MALIGNANT NEOPLASM OF PROSTATE: ICD-10-CM

## 2022-09-04 DIAGNOSIS — S01.81XA LACERATION WITHOUT FOREIGN BODY OF OTHER PART OF HEAD, INITIAL ENCOUNTER: ICD-10-CM

## 2022-09-04 DIAGNOSIS — Z88.6 ALLERGY STATUS TO ANALGESIC AGENT: ICD-10-CM

## 2022-09-04 DIAGNOSIS — Z20.822 CONTACT WITH AND (SUSPECTED) EXPOSURE TO COVID-19: ICD-10-CM
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
## 2022-09-04 DIAGNOSIS — Z79.01 LONG TERM (CURRENT) USE OF ANTICOAGULANTS: ICD-10-CM

## 2022-09-04 DIAGNOSIS — S09.90XA UNSPECIFIED INJURY OF HEAD, INITIAL ENCOUNTER: ICD-10-CM

## 2022-09-04 DIAGNOSIS — S00.83XA CONTUSION OF OTHER PART OF HEAD, INITIAL ENCOUNTER: ICD-10-CM

## 2022-09-04 DIAGNOSIS — Z79.84 LONG TERM (CURRENT) USE OF ORAL HYPOGLYCEMIC DRUGS: ICD-10-CM

## 2022-09-04 DIAGNOSIS — I10 ESSENTIAL (PRIMARY) HYPERTENSION: ICD-10-CM

## 2022-09-04 DIAGNOSIS — I48.91 UNSPECIFIED ATRIAL FIBRILLATION: ICD-10-CM

## 2022-09-04 LAB
ALBUMIN SERPL ELPH-MCNC: 3.2 G/DL — LOW (ref 3.3–5)
ALP SERPL-CCNC: 69 U/L — SIGNIFICANT CHANGE UP (ref 40–120)
ALT FLD-CCNC: 29 U/L — SIGNIFICANT CHANGE UP (ref 12–78)
ANION GAP SERPL CALC-SCNC: 4 MMOL/L — LOW (ref 5–17)
APTT BLD: 34.4 SEC — SIGNIFICANT CHANGE UP (ref 27.5–35.5)
AST SERPL-CCNC: 29 U/L — SIGNIFICANT CHANGE UP (ref 15–37)
BASOPHILS # BLD AUTO: 0.02 K/UL — SIGNIFICANT CHANGE UP (ref 0–0.2)
BASOPHILS NFR BLD AUTO: 0.3 % — SIGNIFICANT CHANGE UP (ref 0–2)
BILIRUB SERPL-MCNC: 0.3 MG/DL — SIGNIFICANT CHANGE UP (ref 0.2–1.2)
BUN SERPL-MCNC: 17 MG/DL — SIGNIFICANT CHANGE UP (ref 7–23)
CALCIUM SERPL-MCNC: 8.8 MG/DL — SIGNIFICANT CHANGE UP (ref 8.5–10.1)
CHLORIDE SERPL-SCNC: 107 MMOL/L — SIGNIFICANT CHANGE UP (ref 96–108)
CO2 SERPL-SCNC: 27 MMOL/L — SIGNIFICANT CHANGE UP (ref 22–31)
CREAT SERPL-MCNC: 1.03 MG/DL — SIGNIFICANT CHANGE UP (ref 0.5–1.3)
EGFR: 71 ML/MIN/1.73M2 — SIGNIFICANT CHANGE UP
EOSINOPHIL # BLD AUTO: 0.17 K/UL — SIGNIFICANT CHANGE UP (ref 0–0.5)
EOSINOPHIL NFR BLD AUTO: 2.8 % — SIGNIFICANT CHANGE UP (ref 0–6)
FLUAV AG NPH QL: SIGNIFICANT CHANGE UP
FLUBV AG NPH QL: SIGNIFICANT CHANGE UP
GLUCOSE BLDC GLUCOMTR-MCNC: 103 MG/DL — HIGH (ref 70–99)
GLUCOSE SERPL-MCNC: 107 MG/DL — HIGH (ref 70–99)
HCT VFR BLD CALC: 37 % — LOW (ref 39–50)
HGB BLD-MCNC: 12.5 G/DL — LOW (ref 13–17)
IMM GRANULOCYTES NFR BLD AUTO: 0.2 % — SIGNIFICANT CHANGE UP (ref 0–1.5)
INR BLD: 1.31 RATIO — HIGH (ref 0.88–1.16)
LACTATE SERPL-SCNC: 1.5 MMOL/L — SIGNIFICANT CHANGE UP (ref 0.7–2)
LIDOCAIN IGE QN: 246 U/L — SIGNIFICANT CHANGE UP (ref 73–393)
LYMPHOCYTES # BLD AUTO: 1.65 K/UL — SIGNIFICANT CHANGE UP (ref 1–3.3)
LYMPHOCYTES # BLD AUTO: 26.9 % — SIGNIFICANT CHANGE UP (ref 13–44)
MCHC RBC-ENTMCNC: 31.5 PG — SIGNIFICANT CHANGE UP (ref 27–34)
MCHC RBC-ENTMCNC: 33.8 G/DL — SIGNIFICANT CHANGE UP (ref 32–36)
MCV RBC AUTO: 93.2 FL — SIGNIFICANT CHANGE UP (ref 80–100)
MONOCYTES # BLD AUTO: 0.65 K/UL — SIGNIFICANT CHANGE UP (ref 0–0.9)
MONOCYTES NFR BLD AUTO: 10.6 % — SIGNIFICANT CHANGE UP (ref 2–14)
NEUTROPHILS # BLD AUTO: 3.64 K/UL — SIGNIFICANT CHANGE UP (ref 1.8–7.4)
NEUTROPHILS NFR BLD AUTO: 59.2 % — SIGNIFICANT CHANGE UP (ref 43–77)
NRBC # BLD: 0 /100 WBCS — SIGNIFICANT CHANGE UP (ref 0–0)
PLATELET # BLD AUTO: 174 K/UL — SIGNIFICANT CHANGE UP (ref 150–400)
POTASSIUM SERPL-MCNC: 4.6 MMOL/L — SIGNIFICANT CHANGE UP (ref 3.5–5.3)
POTASSIUM SERPL-SCNC: 4.6 MMOL/L — SIGNIFICANT CHANGE UP (ref 3.5–5.3)
PROT SERPL-MCNC: 6.9 GM/DL — SIGNIFICANT CHANGE UP (ref 6–8.3)
PROTHROM AB SERPL-ACNC: 15.6 SEC — HIGH (ref 10.5–13.4)
RBC # BLD: 3.97 M/UL — LOW (ref 4.2–5.8)
RBC # FLD: 12.7 % — SIGNIFICANT CHANGE UP (ref 10.3–14.5)
SARS-COV-2 RNA SPEC QL NAA+PROBE: SIGNIFICANT CHANGE UP
SODIUM SERPL-SCNC: 138 MMOL/L — SIGNIFICANT CHANGE UP (ref 135–145)
WBC # BLD: 6.14 K/UL — SIGNIFICANT CHANGE UP (ref 3.8–10.5)
WBC # FLD AUTO: 6.14 K/UL — SIGNIFICANT CHANGE UP (ref 3.8–10.5)

## 2022-09-04 PROCEDURE — 70450 CT HEAD/BRAIN W/O DYE: CPT | Mod: 26,MA

## 2022-09-04 PROCEDURE — 70486 CT MAXILLOFACIAL W/O DYE: CPT | Mod: 26,MA

## 2022-09-04 PROCEDURE — 72170 X-RAY EXAM OF PELVIS: CPT | Mod: 26

## 2022-09-04 PROCEDURE — 72125 CT NECK SPINE W/O DYE: CPT | Mod: 26,MA

## 2022-09-04 PROCEDURE — 71045 X-RAY EXAM CHEST 1 VIEW: CPT | Mod: 26

## 2022-09-04 PROCEDURE — 99285 EMERGENCY DEPT VISIT HI MDM: CPT

## 2022-09-04 NOTE — ED PROVIDER NOTE - PATIENT PORTAL LINK FT
You can access the FollowMyHealth Patient Portal offered by [facility] by registering at the following website: [email or web address]. By joining [employer]’s FollowMyHealth portal, you will also be able to view your health information using other applications (apps) compatible with our system.

## 2022-09-04 NOTE — ED PROVIDER NOTE - PHYSICAL EXAMINATION
VITAL SIGNS: I have reviewed nursing notes and confirm.  CONSTITUTIONAL: well-appearing, non-toxic, NAD  SKIN: Warm dry, normal skin turgor 2 cm linear laceration well approximated R temple   HEAD: NC R temporal contusion   EYES: EOMI, PERRLA, no scleral icterus  ENT: Moist mucous membranes, normal pharynx   NECK: Supple; non tender. Full ROM.   CARD: RRR, no murmurs, rubs or gallops  RESP: clear to ausculation b/l.  No rales, rhonchi, or wheezing.  ABD: soft, + BS, non-tender, non-distended, no rebound or guarding. No CVA tenderness  EXT: Full ROM, no bony tenderness, no pedal edema, no calf tenderness  NEURO: normal motor. normal sensory. CN II-XII intact. Cerebellar testing normal.   PSYCH: Cooperative, appropriate.

## 2022-09-04 NOTE — ED PROVIDER NOTE - CLINICAL SUMMARY MEDICAL DECISION MAKING FREE TEXT BOX
mechanical fall   ct head/c-spine/maxillofacial   screening cxr/pelvis ap   labs, coags   laceration repair w/ skin adhesive - well approximated  return precautions   f/u pcp

## 2022-09-04 NOTE — ED PROVIDER NOTE - PROGRESS NOTE DETAILS
Kushal: Pt care signed out to me at change of shift, pending CT results. CT imaging w/o acute injury. On re-eval, resting comfortably, in NAD. Family at bedside. Stable for d/c home. Instructed for close outpatient PCP f/u. Return signs / symptoms d/w pt, family. They understand / agree w/ this plan.

## 2022-09-04 NOTE — ED PROVIDER NOTE - OBJECTIVE STATEMENT
87-year-old male with past medical history of A. fib on Xarelto, prostate cancer, hypertension, spinal stenosis presented with right-sided facial contusion and laceration, no LOC status post mechanical slip and fall fell forward hit head denies any headache neck pain or back pain no current chest pain shortness of breath or abdominal pain.  Denies any preceding symptoms prior to falling.  Patient has history of mechanical falls.  Denies any current complaints.

## 2022-09-04 NOTE — ED ADULT NURSE NOTE - OBJECTIVE STATEMENT
patient c/o of headache and R side face pain , hematoma noted R eye , patient had a fall today unknown LOC. pt on Xarelto.

## 2022-09-04 NOTE — ED ADULT TRIAGE NOTE - CHIEF COMPLAINT QUOTE
patient c/o of headache and R side face pain , hematoma noted R eye , patient had a fall today unknown LOC patient c/o of headache and R side face pain , hematoma noted R eye , patient had a fall today unknown LOC. pt on Xarelto.

## 2022-10-11 ENCOUNTER — EMERGENCY (EMERGENCY)
Facility: HOSPITAL | Age: 87
LOS: 0 days | Discharge: HOME | End: 2022-10-11
Attending: STUDENT IN AN ORGANIZED HEALTH CARE EDUCATION/TRAINING PROGRAM

## 2022-10-11 VITALS
WEIGHT: 169.98 LBS | SYSTOLIC BLOOD PRESSURE: 102 MMHG | TEMPERATURE: 98 F | DIASTOLIC BLOOD PRESSURE: 66 MMHG | OXYGEN SATURATION: 97 % | RESPIRATION RATE: 16 BRPM | HEIGHT: 64 IN | HEART RATE: 86 BPM

## 2022-10-11 VITALS
TEMPERATURE: 98 F | RESPIRATION RATE: 17 BRPM | DIASTOLIC BLOOD PRESSURE: 70 MMHG | SYSTOLIC BLOOD PRESSURE: 116 MMHG | OXYGEN SATURATION: 99 % | HEART RATE: 74 BPM

## 2022-10-11 DIAGNOSIS — C62.90 MALIGNANT NEOPLASM OF UNSPECIFIED TESTIS, UNSPECIFIED WHETHER DESCENDED OR UNDESCENDED: ICD-10-CM

## 2022-10-11 DIAGNOSIS — Z85.46 PERSONAL HISTORY OF MALIGNANT NEOPLASM OF PROSTATE: ICD-10-CM

## 2022-10-11 DIAGNOSIS — E11.9 TYPE 2 DIABETES MELLITUS WITHOUT COMPLICATIONS: ICD-10-CM
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
## 2022-10-11 DIAGNOSIS — S09.90XA UNSPECIFIED INJURY OF HEAD, INITIAL ENCOUNTER: ICD-10-CM

## 2022-10-11 DIAGNOSIS — Z79.01 LONG TERM (CURRENT) USE OF ANTICOAGULANTS: ICD-10-CM

## 2022-10-11 DIAGNOSIS — Z79.84 LONG TERM (CURRENT) USE OF ORAL HYPOGLYCEMIC DRUGS: ICD-10-CM

## 2022-10-11 DIAGNOSIS — S01.81XA LACERATION WITHOUT FOREIGN BODY OF OTHER PART OF HEAD, INITIAL ENCOUNTER: ICD-10-CM

## 2022-10-11 DIAGNOSIS — I10 ESSENTIAL (PRIMARY) HYPERTENSION: ICD-10-CM

## 2022-10-11 DIAGNOSIS — M54.2 CERVICALGIA: ICD-10-CM

## 2022-10-11 DIAGNOSIS — Z88.6 ALLERGY STATUS TO ANALGESIC AGENT: ICD-10-CM

## 2022-10-11 DIAGNOSIS — I48.91 UNSPECIFIED ATRIAL FIBRILLATION: ICD-10-CM
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
## 2022-10-11 PROCEDURE — 72125 CT NECK SPINE W/O DYE: CPT | Mod: 26,MA

## 2022-10-11 PROCEDURE — 71045 X-RAY EXAM CHEST 1 VIEW: CPT | Mod: 26

## 2022-10-11 PROCEDURE — 70450 CT HEAD/BRAIN W/O DYE: CPT | Mod: 26,MA

## 2022-10-11 PROCEDURE — 72170 X-RAY EXAM OF PELVIS: CPT | Mod: 26

## 2022-10-11 PROCEDURE — 70486 CT MAXILLOFACIAL W/O DYE: CPT | Mod: 26,MA

## 2022-10-11 PROCEDURE — 99285 EMERGENCY DEPT VISIT HI MDM: CPT | Mod: 25

## 2022-10-11 PROCEDURE — 12013 RPR F/E/E/N/L/M 2.6-5.0 CM: CPT

## 2022-10-11 NOTE — ED PROVIDER NOTE - PROGRESS NOTE DETAILS
CTs neg for acute findings.  Lac repaired.  Patient nontoxic and medically stable for discharge. Results discussed with patient's son in law at bedside. Return precautions provided and patient understands to return to the ED for concerning or worsening signs and symptoms. Instructed to follow up with primary care physician and return for suture removal in 5-7 days and agreeable. Patient's questions answered.

## 2022-10-11 NOTE — ED ADULT NURSE NOTE - OBJECTIVE STATEMENT
patient is A&Ox2. unable to recall time of month and year. PMH afib, borderline diabetes, cataract of left eye, dementia, prostate cancer. Breathing unlabored on RA. Son Mason Vann at bedside. As per son, patient tripped over the carpet, fell on his face on tile floor and hit his head. Complaining of left forehead laceration, patient noted with dressing on laceration, no active bleeding at this time. Patient noted with blood around left nostril from fall today, no active bleeding at this time. patient complaining of right sided neck pain. As per son, patient had multiple falls in the last 4 months. Patient on restarted on Xarelto 20mg yesterday by cardiologist. As per son, patient been off Xarelto for a month by cardiologist to have a echo done. reports having a ECHO done 10 days ago.

## 2022-10-11 NOTE — ED PROVIDER NOTE - OBJECTIVE STATEMENT
87-year-old male history of A. fib on Xarelto, borderline diabetes, hypertension, testicular cancer in remission presents for injuries from mechanical fall.  Patient with son who states that patient has dementia however he witnessed patient fall.  States that trying to use his walker over a patch of carpet when the walker got stuck and he fell landing on his face.  No loss of consciousness reported.  Patient laceration to left forehead as well as head and neck pain.  Denies any vomiting.  No other injuries noted.

## 2022-10-11 NOTE — ED PROVIDER NOTE - CLINICAL SUMMARY MEDICAL DECISION MAKING FREE TEXT BOX
87-year-old male history of A. fib on Xarelto, borderline diabetes, hypertension, testicular cancer in remission presents for injuries from mechanical fall.  Will obtain CTs, CXR, repair laceration.  Per son, patient had tetanus vaccine 1 year ago. 87-year-old male history of A. fib on Xarelto, borderline diabetes, hypertension, testicular cancer in remission presents for injuries from mechanical fall.  Will obtain CTs, CXR, repair laceration.  Per son, patient had tetanus vaccine 1 year ago.    Attending Roddy: 86 y/o M w/ PMH as above presenting w/ mechanical fall. Agree w/ above HPI/ROS/PE. Pt overall well appearing, no acute distress. Per son, witnessed fall and no syncope. Will obtain imaging to eval for traumatic findings. Lac will require repair. Defer labs at this time. Will reassess the need for additional interventions as clinically warranted.

## 2022-10-11 NOTE — ED PROVIDER NOTE - PHYSICAL EXAMINATION
GEN: Awake, alert, interactive, NAD.  HEAD AND NECK: NC/AT. Airway patent. Neck supple. no septal hematoma.  nonfocal left sided and posterior ttp.   EYES:  Clear b/l. EOMI. PERRL.   ENT: Moist mucus membranes.   CARDIAC: 2+ pulses, no pedal edema.  RESP/CHEST: Normal respiratory effort with no use of accessory muscles or retractions. Clear throughout on auscultation. No chest wall TTP.   ABD: Soft, non-distended, non-tender. No rebound, no guarding.   BACK: No midline C / T / L  spinal TTP, step-offs or deformities. No CVAT.   EXTREMITIES: Moving all extremities with no apparent deformities. No TTP BL clavicles / shoulders / elbows / wrists / hips / knees / ankles.   SKIN: Warm, dry, normal color. No rash. 3cm linear vertical laceration to left forehead. Mild active bleeding.   NEURO: AO to baseline, moving all extremities.  PSYCH: Appropriate mood and affect. GEN: Awake, alert, interactive, NAD.  HEAD AND NECK: Normocephalic. Airway patent. Neck supple. no septal hematoma.  nonfocal left sided and posterior ttp.   EYES:  Clear b/l. EOMI. PERRL.   ENT: Moist mucus membranes.   CARDIAC: 2+ pulses, no pedal edema.  RESP/CHEST: Normal respiratory effort with no use of accessory muscles or retractions. Clear throughout on auscultation. No chest wall TTP.   ABD: Soft, non-distended, non-tender. No rebound, no guarding.   BACK: No midline C / T / L  spinal TTP, step-offs or deformities. No CVAT.   EXTREMITIES: Moving all extremities with no apparent deformities. No TTP BL clavicles / shoulders / elbows / wrists / hips / knees / ankles.   SKIN: Warm, dry, normal color. No rash. 3cm linear vertical laceration to left forehead. Mild active bleeding.   NEURO: AO to baseline, moving all extremities.  PSYCH: Appropriate mood and affect.

## 2022-10-11 NOTE — ED ADULT NURSE NOTE - NSICDXPASTSURGICALHX_GEN_ALL_CORE_FT
PAST SURGICAL HISTORY:  Cataract extraction status of left eye     Larynx polyp Excision of larynx polyp; 2009

## 2022-10-11 NOTE — ED PROVIDER NOTE - NSFOLLOWUPINSTRUCTIONS_ED_ALL_ED_FT
Closed Head Injury    A closed head injury is an injury to your head that may or may not involve a traumatic brain injury (TBI). Symptoms of TBI can be short or long lasting and include headache, dizziness, interference with memory or speech, fatigue, confusion, changes in sleep, mood changes, nausea, depression/anxiety, and dulling of senses. Make sure to obtain proper rest which includes getting plenty of sleep, avoiding excessive visual stimulation, and avoiding activities that may cause physical or mental stress. Avoid any situation where there is potential for another head injury, including sports.    SEEK IMMEDIATE MEDICAL CARE IF YOU HAVE ANY OF THE FOLLOWING SYMPTOMS: unusual drowsiness, vomiting, severe dizziness, seizures, lightheadedness, muscular weakness, different pupil sizes, visual changes, or clear or bloody discharge from your ears or nose.     Laceration    A laceration is a cut that goes through all of the layers of the skin and into the tissue that is right under the skin. Some lacerations heal on their own. Others need to be closed with skin adhesive strips, skin glue, stitches (sutures), or staples. Proper laceration care minimizes the risk of infection and helps the laceration to heal better.  If non-absorbable stitches or staples have been placed, they must be taken out within the time frame instructed by your healthcare provider.    SEEK IMMEDIATE MEDICAL CARE IF YOU HAVE ANY OF THE FOLLOWING SYMPTOMS: swelling around the wound, worsening pain, drainage from the wound, red streaking going away from your wound, inability to move finger or toe near the laceration, or discoloration of skin near the laceration.    Keep the dressing in place as is for 24 hours. Do not allow to become wet.    After 24 hours, you may remove the dressing and clean with regular soap and water daily. Do NOT scrub.    Dry thoroughly and apply bacitracin/ointment. Cover with regular bandage. Do not expose wound to light.     ***Return to the ER in 5-7 days for suture removal***

## 2022-10-11 NOTE — ED ADULT TRIAGE NOTE - CHIEF COMPLAINT QUOTE
as per son in law Mason, dad fell flat on his face on tile floor while helping him to dress this am, c/o neck pains, laceration to left forehead. patient on Xarelto 20mgs daily.

## 2022-10-11 NOTE — ED PROVIDER NOTE - PATIENT PORTAL LINK FT
You can access the FollowMyHealth Patient Portal offered by [facility] by registering at the following website: [email or web address]. By joining [facility] [facility]’s FollowMyHealth portal, you will also be able to view your health information using other applications (apps) compatible with our system.

## 2022-10-11 NOTE — ED ADULT NURSE NOTE - NSICDXPASTMEDICALHX_GEN_ALL_CORE_FT
PAST MEDICAL HISTORY:  Atrial fibrillation     Borderline diabetes     Cataract of left eye     Cervical disc displacement     HTN (hypertension)     Hx of radiation therapy 2011    Kidney cyst, acquired left    Obesity (BMI 30-39.9)     Prostate cancer 2011

## 2022-10-18 ENCOUNTER — EMERGENCY (EMERGENCY)
Facility: HOSPITAL | Age: 87
LOS: 0 days | Discharge: HOME | End: 2022-10-18

## 2022-10-18 VITALS
DIASTOLIC BLOOD PRESSURE: 64 MMHG | RESPIRATION RATE: 16 BRPM | OXYGEN SATURATION: 97 % | TEMPERATURE: 98 F | SYSTOLIC BLOOD PRESSURE: 111 MMHG | WEIGHT: 169.98 LBS | HEIGHT: 64 IN | HEART RATE: 80 BPM

## 2022-10-18 DIAGNOSIS — M50.20 OTHER CERVICAL DISC DISPLACEMENT, UNSPECIFIED CERVICAL REGION: ICD-10-CM

## 2022-10-18 DIAGNOSIS — I10 ESSENTIAL (PRIMARY) HYPERTENSION: ICD-10-CM

## 2022-10-18 DIAGNOSIS — Z88.6 ALLERGY STATUS TO ANALGESIC AGENT: ICD-10-CM

## 2022-10-18 DIAGNOSIS — S01.81XD LACERATION WITHOUT FOREIGN BODY OF OTHER PART OF HEAD, SUBSEQUENT ENCOUNTER: ICD-10-CM

## 2022-10-18 DIAGNOSIS — Z79.84 LONG TERM (CURRENT) USE OF ORAL HYPOGLYCEMIC DRUGS: ICD-10-CM

## 2022-10-18 DIAGNOSIS — Z79.01 LONG TERM (CURRENT) USE OF ANTICOAGULANTS: ICD-10-CM

## 2022-10-18 DIAGNOSIS — Z85.46 PERSONAL HISTORY OF MALIGNANT NEOPLASM OF PROSTATE: ICD-10-CM

## 2022-10-18 DIAGNOSIS — Z98.42 CATARACT EXTRACTION STATUS, LEFT EYE: ICD-10-CM

## 2022-10-18 DIAGNOSIS — R73.03 PREDIABETES: ICD-10-CM

## 2022-10-18 DIAGNOSIS — I48.91 UNSPECIFIED ATRIAL FIBRILLATION: ICD-10-CM
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
## 2022-10-18 PROCEDURE — L9995: CPT

## 2022-10-18 NOTE — ED ADULT NURSE NOTE - NSIMPLEMENTINTERV_GEN_ALL_ED
Implemented All Fall Risk Interventions:  [place] to call system. Call bell, personal items and telephone within reach. Instruct patient to call for assistance. Room bathroom lighting operational. Non-slip footwear when patient is off stretcher. Physically safe environment: no spills, clutter or unnecessary equipment. Stretcher in lowest position, wheels locked, appropriate side rails in place. Provide visual cue, wrist band, yellow gown, etc. Monitor gait and stability. Monitor for mental status changes and reorient to person, place, and time. Review medications for side effects contributing to fall risk. Reinforce activity limits and safety measures with patient and family.

## 2022-10-18 NOTE — ED ADULT TRIAGE NOTE - CHIEF COMPLAINT QUOTE
here for suture removal on forehead, sutures placed on 10/11. deneis fevers, chills, drainage
Contact precautions...

## 2022-10-18 NOTE — ED PROVIDER NOTE - CLINICAL SUMMARY MEDICAL DECISION MAKING FREE TEXT BOX
87-year-old male with past medical history of hypertension, prediabetes, A. fib on Xarelto presents emergency room for suture removal.  Patient had a mechanical trip and fall 6 days ago and has 6 sutures placed to his forehead.  Denies fever, chills, headaches, acute change in vision, nausea, vomiting, pus drainage from area.  Denies additional pain or complaints today. Vital signs stable, wound clean dry and intact without signs of infection. 6 sutures removed, kevin jiménez.

## 2022-10-18 NOTE — ED PROVIDER NOTE - NSFOLLOWUPINSTRUCTIONS_ED_ALL_ED_FT
Today you were seen in the ER for suture removal.     Take Tylenol 650mg (Two 325 mg pills) every 4-6 hours as needed for pain.    SEEK IMMEDIATE MEDICAL CARE IF YOU HAVE ANY OF THE FOLLOWING SYMPTOMS: swelling around the wound, worsening pain, drainage from the wound, red streaking going away from your wound, inability to move finger or toe near the laceration, or discoloration of skin near the laceration.    Advance activity as tolerated.      Continue all previously prescribed medications as directed unless otherwise instructed.      Follow up with your primary care physician in 48-72 hours- bring copies of your results.

## 2022-10-18 NOTE — ED PROVIDER NOTE - PATIENT PORTAL LINK FT
You can access the FollowMyHealth Patient Portal offered by [facility] by registering at the following website: [email or web address]. By joining [employer]’s FollowMyHealth portal, you will also be able to view your health information using other applications (apps) compatible with our system.

## 2022-10-18 NOTE — ED ADULT NURSE NOTE - BREATH SOUNDS, MLM
If you are a smoker, it is important for your health to stop smoking. Please be aware that second hand smoke is also harmful. Clear

## 2022-10-18 NOTE — ED PROVIDER NOTE - OBJECTIVE STATEMENT
87-year-old male with past medical history of hypertension, prediabetes, A. fib on Xarelto presents emergency room for suture removal.  Patient had a mechanical trip and fall 6 days ago and has 6 sutures placed to his forehead.  Denies fever, chills, headaches, acute change in vision, nausea, vomiting, pus drainage from area.  Denies additional pain or complaints today.

## 2022-10-18 NOTE — ED ADULT NURSE NOTE - OBJECTIVE STATEMENT
Patient came to ER for suture removal. Sutures removed no bleeding or signs of infection noted. Son at bedside .

## 2022-10-18 NOTE — ED PROVIDER NOTE - NS ED ROS FT
Constitutional: (-) Fever, (-) Chills  Skin: (+)Laceration, (-) Rashes  Eyes: (-) Visual changes, (-) Discharge, (-) Redness  Ears: (-) Hearing loss, (-)Tinnitus, (-) Ear pain  Nose: (-) Nasal congestion, (-) Runny nose  Mouth/Throat: (-) Sore throat  CV: (-) Chest pain  Resp: (-) Cough, (-) Shortness of breath, (-) Dyspnea on Exertion, (-) Wheezing  GI: (-) Abdominal pain, (-) Nausea, (-) Vomiting, (-) Diarrhea  : (-) Dysuria   MSK: (-) Myalgias  Neuro: (-) Headache

## 2022-12-28 ENCOUNTER — INPATIENT (INPATIENT)
Facility: HOSPITAL | Age: 87
LOS: 12 days | Discharge: HOME HEALTH | DRG: 518 | End: 2023-01-10
Attending: NEUROLOGICAL SURGERY | Admitting: SURGERY
Payer: COMMERCIAL

## 2022-12-28 ENCOUNTER — EMERGENCY (EMERGENCY)
Facility: HOSPITAL | Age: 87
LOS: 0 days | Discharge: ANOTHER HOSPITAL | End: 2022-12-28
Attending: EMERGENCY MEDICINE
Payer: MEDICARE

## 2022-12-28 VITALS
RESPIRATION RATE: 18 BRPM | DIASTOLIC BLOOD PRESSURE: 64 MMHG | HEIGHT: 66 IN | WEIGHT: 184.97 LBS | TEMPERATURE: 98 F | HEART RATE: 82 BPM | OXYGEN SATURATION: 96 % | SYSTOLIC BLOOD PRESSURE: 101 MMHG

## 2022-12-28 VITALS
DIASTOLIC BLOOD PRESSURE: 62 MMHG | SYSTOLIC BLOOD PRESSURE: 134 MMHG | HEIGHT: 66 IN | OXYGEN SATURATION: 97 % | RESPIRATION RATE: 20 BRPM | WEIGHT: 190.04 LBS | TEMPERATURE: 98 F | HEART RATE: 98 BPM

## 2022-12-28 VITALS
OXYGEN SATURATION: 94 % | HEART RATE: 92 BPM | TEMPERATURE: 99 F | SYSTOLIC BLOOD PRESSURE: 149 MMHG | DIASTOLIC BLOOD PRESSURE: 80 MMHG | RESPIRATION RATE: 25 BRPM

## 2022-12-28 DIAGNOSIS — S32.029A UNSPECIFIED FRACTURE OF SECOND LUMBAR VERTEBRA, INITIAL ENCOUNTER FOR CLOSED FRACTURE: ICD-10-CM

## 2022-12-28 LAB
ABO RH CONFIRMATION: SIGNIFICANT CHANGE UP
ALBUMIN SERPL ELPH-MCNC: 2.9 G/DL — LOW (ref 3.3–5)
ALBUMIN SERPL ELPH-MCNC: 3.5 G/DL — SIGNIFICANT CHANGE UP (ref 3.3–5)
ALP SERPL-CCNC: 68 U/L — SIGNIFICANT CHANGE UP (ref 40–120)
ALP SERPL-CCNC: 69 U/L — SIGNIFICANT CHANGE UP (ref 40–120)
ALT FLD-CCNC: 17 U/L — SIGNIFICANT CHANGE UP (ref 10–45)
ALT FLD-CCNC: 23 U/L — SIGNIFICANT CHANGE UP (ref 12–78)
ANION GAP SERPL CALC-SCNC: 6 MMOL/L — SIGNIFICANT CHANGE UP (ref 5–17)
ANION GAP SERPL CALC-SCNC: 9 MMOL/L — SIGNIFICANT CHANGE UP (ref 5–17)
APTT BLD: 37.2 SEC — HIGH (ref 27.5–35.5)
APTT BLD: 37.8 SEC — HIGH (ref 27.5–35.5)
AST SERPL-CCNC: 19 U/L — SIGNIFICANT CHANGE UP (ref 10–40)
AST SERPL-CCNC: 27 U/L — SIGNIFICANT CHANGE UP (ref 15–37)
BASOPHILS # BLD AUTO: 0.01 K/UL — SIGNIFICANT CHANGE UP (ref 0–0.2)
BASOPHILS # BLD AUTO: 0.02 K/UL — SIGNIFICANT CHANGE UP (ref 0–0.2)
BASOPHILS NFR BLD AUTO: 0.1 % — SIGNIFICANT CHANGE UP (ref 0–2)
BASOPHILS NFR BLD AUTO: 0.2 % — SIGNIFICANT CHANGE UP (ref 0–2)
BILIRUB SERPL-MCNC: 0.5 MG/DL — SIGNIFICANT CHANGE UP (ref 0.2–1.2)
BILIRUB SERPL-MCNC: 0.6 MG/DL — SIGNIFICANT CHANGE UP (ref 0.2–1.2)
BLD GP AB SCN SERPL QL: SIGNIFICANT CHANGE UP
BUN SERPL-MCNC: 32 MG/DL — HIGH (ref 7–23)
BUN SERPL-MCNC: 33 MG/DL — HIGH (ref 7–23)
CALCIUM SERPL-MCNC: 8.4 MG/DL — LOW (ref 8.5–10.1)
CALCIUM SERPL-MCNC: 8.5 MG/DL — SIGNIFICANT CHANGE UP (ref 8.4–10.5)
CHLORIDE SERPL-SCNC: 103 MMOL/L — SIGNIFICANT CHANGE UP (ref 96–108)
CHLORIDE SERPL-SCNC: 105 MMOL/L — SIGNIFICANT CHANGE UP (ref 96–108)
CO2 SERPL-SCNC: 26 MMOL/L — SIGNIFICANT CHANGE UP (ref 22–31)
CO2 SERPL-SCNC: 26 MMOL/L — SIGNIFICANT CHANGE UP (ref 22–31)
CREAT SERPL-MCNC: 1.12 MG/DL — SIGNIFICANT CHANGE UP (ref 0.5–1.3)
CREAT SERPL-MCNC: 1.31 MG/DL — HIGH (ref 0.5–1.3)
EGFR: 53 ML/MIN/1.73M2 — LOW
EGFR: 64 ML/MIN/1.73M2 — SIGNIFICANT CHANGE UP
EOSINOPHIL # BLD AUTO: 0.01 K/UL — SIGNIFICANT CHANGE UP (ref 0–0.5)
EOSINOPHIL # BLD AUTO: 0.04 K/UL — SIGNIFICANT CHANGE UP (ref 0–0.5)
EOSINOPHIL NFR BLD AUTO: 0.1 % — SIGNIFICANT CHANGE UP (ref 0–6)
EOSINOPHIL NFR BLD AUTO: 0.4 % — SIGNIFICANT CHANGE UP (ref 0–6)
ETHANOL SERPL-MCNC: <10 MG/DL — SIGNIFICANT CHANGE UP (ref 0–10)
FLUAV AG NPH QL: SIGNIFICANT CHANGE UP
FLUAV AG NPH QL: SIGNIFICANT CHANGE UP
FLUBV AG NPH QL: SIGNIFICANT CHANGE UP
FLUBV AG NPH QL: SIGNIFICANT CHANGE UP
GLUCOSE SERPL-MCNC: 160 MG/DL — HIGH (ref 70–99)
GLUCOSE SERPL-MCNC: 200 MG/DL — HIGH (ref 70–99)
HCT VFR BLD CALC: 26.3 % — LOW (ref 39–50)
HCT VFR BLD CALC: 30.4 % — LOW (ref 39–50)
HGB BLD-MCNC: 10 G/DL — LOW (ref 13–17)
HGB BLD-MCNC: 8.5 G/DL — LOW (ref 13–17)
IMM GRANULOCYTES NFR BLD AUTO: 0.3 % — SIGNIFICANT CHANGE UP (ref 0–0.9)
IMM GRANULOCYTES NFR BLD AUTO: 0.4 % — SIGNIFICANT CHANGE UP (ref 0–0.9)
INR BLD: 1.82 RATIO — HIGH (ref 0.88–1.16)
INR BLD: 2.4 RATIO — HIGH (ref 0.88–1.16)
LACTATE BLDV-MCNC: 2.6 MMOL/L — HIGH (ref 0.5–2)
LYMPHOCYTES # BLD AUTO: 1.04 K/UL — SIGNIFICANT CHANGE UP (ref 1–3.3)
LYMPHOCYTES # BLD AUTO: 1.3 K/UL — SIGNIFICANT CHANGE UP (ref 1–3.3)
LYMPHOCYTES # BLD AUTO: 10.6 % — LOW (ref 13–44)
LYMPHOCYTES # BLD AUTO: 14.5 % — SIGNIFICANT CHANGE UP (ref 13–44)
MCHC RBC-ENTMCNC: 30.6 PG — SIGNIFICANT CHANGE UP (ref 27–34)
MCHC RBC-ENTMCNC: 30.8 PG — SIGNIFICANT CHANGE UP (ref 27–34)
MCHC RBC-ENTMCNC: 32.3 GM/DL — SIGNIFICANT CHANGE UP (ref 32–36)
MCHC RBC-ENTMCNC: 32.9 G/DL — SIGNIFICANT CHANGE UP (ref 32–36)
MCV RBC AUTO: 93.5 FL — SIGNIFICANT CHANGE UP (ref 80–100)
MCV RBC AUTO: 94.6 FL — SIGNIFICANT CHANGE UP (ref 80–100)
MONOCYTES # BLD AUTO: 1.06 K/UL — HIGH (ref 0–0.9)
MONOCYTES # BLD AUTO: 1.06 K/UL — HIGH (ref 0–0.9)
MONOCYTES NFR BLD AUTO: 10.8 % — SIGNIFICANT CHANGE UP (ref 2–14)
MONOCYTES NFR BLD AUTO: 11.8 % — SIGNIFICANT CHANGE UP (ref 2–14)
NEUTROPHILS # BLD AUTO: 6.54 K/UL — SIGNIFICANT CHANGE UP (ref 1.8–7.4)
NEUTROPHILS # BLD AUTO: 7.6 K/UL — HIGH (ref 1.8–7.4)
NEUTROPHILS NFR BLD AUTO: 72.9 % — SIGNIFICANT CHANGE UP (ref 43–77)
NEUTROPHILS NFR BLD AUTO: 77.9 % — HIGH (ref 43–77)
NRBC # BLD: 0 /100 WBCS — SIGNIFICANT CHANGE UP (ref 0–0)
NRBC # BLD: 0 /100 WBCS — SIGNIFICANT CHANGE UP (ref 0–0)
PLATELET # BLD AUTO: 147 K/UL — LOW (ref 150–400)
PLATELET # BLD AUTO: 160 K/UL — SIGNIFICANT CHANGE UP (ref 150–400)
POTASSIUM SERPL-MCNC: 4.7 MMOL/L — SIGNIFICANT CHANGE UP (ref 3.5–5.3)
POTASSIUM SERPL-MCNC: 4.7 MMOL/L — SIGNIFICANT CHANGE UP (ref 3.5–5.3)
POTASSIUM SERPL-SCNC: 4.7 MMOL/L — SIGNIFICANT CHANGE UP (ref 3.5–5.3)
POTASSIUM SERPL-SCNC: 4.7 MMOL/L — SIGNIFICANT CHANGE UP (ref 3.5–5.3)
PROT SERPL-MCNC: 6.4 GM/DL — SIGNIFICANT CHANGE UP (ref 6–8.3)
PROT SERPL-MCNC: 6.5 G/DL — SIGNIFICANT CHANGE UP (ref 6–8.3)
PROTHROM AB SERPL-ACNC: 21 SEC — HIGH (ref 10.5–13.4)
PROTHROM AB SERPL-ACNC: 28.8 SEC — HIGH (ref 10.5–13.4)
RBC # BLD: 2.78 M/UL — LOW (ref 4.2–5.8)
RBC # BLD: 3.25 M/UL — LOW (ref 4.2–5.8)
RBC # FLD: 13.2 % — SIGNIFICANT CHANGE UP (ref 10.3–14.5)
RBC # FLD: 13.2 % — SIGNIFICANT CHANGE UP (ref 10.3–14.5)
RSV RNA NPH QL NAA+NON-PROBE: SIGNIFICANT CHANGE UP
SARS-COV-2 RNA SPEC QL NAA+PROBE: SIGNIFICANT CHANGE UP
SARS-COV-2 RNA SPEC QL NAA+PROBE: SIGNIFICANT CHANGE UP
SODIUM SERPL-SCNC: 137 MMOL/L — SIGNIFICANT CHANGE UP (ref 135–145)
SODIUM SERPL-SCNC: 138 MMOL/L — SIGNIFICANT CHANGE UP (ref 135–145)
WBC # BLD: 8.98 K/UL — SIGNIFICANT CHANGE UP (ref 3.8–10.5)
WBC # BLD: 9.71 K/UL — SIGNIFICANT CHANGE UP (ref 3.8–10.5)
WBC # FLD AUTO: 8.98 K/UL — SIGNIFICANT CHANGE UP (ref 3.8–10.5)
WBC # FLD AUTO: 9.71 K/UL — SIGNIFICANT CHANGE UP (ref 3.8–10.5)

## 2022-12-28 PROCEDURE — 99291 CRITICAL CARE FIRST HOUR: CPT

## 2022-12-28 PROCEDURE — 72128 CT CHEST SPINE W/O DYE: CPT | Mod: 26,MA

## 2022-12-28 PROCEDURE — 93010 ELECTROCARDIOGRAM REPORT: CPT

## 2022-12-28 PROCEDURE — 93010 ELECTROCARDIOGRAM REPORT: CPT | Mod: 76

## 2022-12-28 PROCEDURE — 99223 1ST HOSP IP/OBS HIGH 75: CPT | Mod: GC

## 2022-12-28 PROCEDURE — 72192 CT PELVIS W/O DYE: CPT | Mod: 26,MA

## 2022-12-28 PROCEDURE — 72131 CT LUMBAR SPINE W/O DYE: CPT | Mod: 26,MA

## 2022-12-28 PROCEDURE — 72125 CT NECK SPINE W/O DYE: CPT | Mod: 26,MA

## 2022-12-28 PROCEDURE — 70450 CT HEAD/BRAIN W/O DYE: CPT | Mod: 26,MA

## 2022-12-28 PROCEDURE — L9993: CPT

## 2022-12-28 RX ORDER — FENTANYL CITRATE 50 UG/ML
50 INJECTION INTRAVENOUS ONCE
Refills: 0 | Status: DISCONTINUED | OUTPATIENT
Start: 2022-12-28 | End: 2022-12-28

## 2022-12-28 RX ORDER — SODIUM CHLORIDE 9 MG/ML
2000 INJECTION INTRAMUSCULAR; INTRAVENOUS; SUBCUTANEOUS ONCE
Refills: 0 | Status: COMPLETED | OUTPATIENT
Start: 2022-12-28 | End: 2022-12-28

## 2022-12-28 RX ORDER — ACETAMINOPHEN 500 MG
1000 TABLET ORAL ONCE
Refills: 0 | Status: COMPLETED | OUTPATIENT
Start: 2022-12-28 | End: 2022-12-28

## 2022-12-28 RX ORDER — PROTHROMBIN COMPLEX CONCENTRATE (HUMAN) 25.5; 16.5; 24; 22; 22; 26 [IU]/ML; [IU]/ML; [IU]/ML; [IU]/ML; [IU]/ML; [IU]/ML
4000 POWDER, FOR SOLUTION INTRAVENOUS ONCE
Refills: 0 | Status: COMPLETED | OUTPATIENT
Start: 2022-12-28 | End: 2022-12-28

## 2022-12-28 RX ORDER — SODIUM CHLORIDE 9 MG/ML
1000 INJECTION INTRAMUSCULAR; INTRAVENOUS; SUBCUTANEOUS ONCE
Refills: 0 | Status: COMPLETED | OUTPATIENT
Start: 2022-12-28 | End: 2022-12-28

## 2022-12-28 RX ADMIN — SODIUM CHLORIDE 1000 MILLILITER(S): 9 INJECTION INTRAMUSCULAR; INTRAVENOUS; SUBCUTANEOUS at 14:17

## 2022-12-28 RX ADMIN — FENTANYL CITRATE 50 MICROGRAM(S): 50 INJECTION INTRAVENOUS at 17:59

## 2022-12-28 RX ADMIN — SODIUM CHLORIDE 2000 MILLILITER(S): 9 INJECTION INTRAMUSCULAR; INTRAVENOUS; SUBCUTANEOUS at 17:13

## 2022-12-28 RX ADMIN — Medication 400 MILLIGRAM(S): at 14:17

## 2022-12-28 RX ADMIN — PROTHROMBIN COMPLEX CONCENTRATE (HUMAN) 400 INTERNATIONAL UNIT(S): 25.5; 16.5; 24; 22; 22; 26 POWDER, FOR SOLUTION INTRAVENOUS at 17:58

## 2022-12-28 NOTE — ED ADULT NURSE NOTE - RELATIONSHIP TO PATIENT
To get better and follow your care plan as instructed:    1)  Please take Tylenol 650 mg every 4 to 6 hours by mouth for moderate pain control.   2) Please take Percocet 1 to 2 tabs by mouth every 4 to 6 hours as needed for severe pain control.  3) Please start taking Eliquis Friday July 26, 2019 2.5 mg twice a day by mouth for 30 days.   4) Please take Omeprazole 40 mg once a day by mouth. spn

## 2022-12-28 NOTE — CONSULT NOTE ADULT - ASSESSMENT
87M hx afib on xarelto and dementia presenting as trauma xfer. Fell off chair at dinner. Normally walks w/ walker but unable to walk since this AM. Prior scans showing L2 fx and RP hematoma w/ hypotension. Got Kcentra. 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 Exam: aox1, CROSS strongly at least 4+/5, reports back pain, no Stock, no clonus.   -No acute nsgy intervention  -MRI L spine  -Bedrest and spine precautions until MRI done and reviewed by nsgy  -Hold AC/AP  -Rec adm trauma for RP bleed in setting of xarelto and hypotension

## 2022-12-28 NOTE — H&P ADULT - ATTENDING COMMENTS
87M with dementia, on Xarelto for afib, ground-level fall yesterday, no LOC,  transferred from [facility],  seen and examined upon arrival as level 2 trauma.    GCS = 14 (confused)  hemodynamically stable  NC/AT  no c-spine tenderness or limit in full active ROM  not rolled since he has an unstable L2 fracture  no chest wall tenderness  soft / NT / ND  pelvic girdle stable  no deformity x 4 extremities  5/5 strength bilateral lower extremities      hgb - 10.0 -> 8.5 g/dL  Cr - 1.3  lactate - 2.6    CT head / c-spine / facial bones - no intracranial hemorrhage or c-spine fracture. no facial fractures.  CT thoracic/lumbar spine / pelvis w/o contrast - linear L2 vertebral fracture with widening of L2-L3 ligamentum flavum highly suspicious for Chance fracture, small left psoas hematoma      L2 Chance fracture  -strict bedrest with logroll precautions  -MRI L-spine  -consult neurosurgery  -given advanced age and dementia, he has a poor prognosis with or without posterior spinal fusion    small left psoas hematoma adjacent to L2 fracture  mild lactic acidosis  acute-on-chronic blood loss anemia  -hold Xarelto  -already got Kcentra at [facility]  -given his delayed presentation after injury, very low risk for ongoing bleeding, and mildly decreased GFR, will not repeat CT scan with contrast.      I reviewed his labs and radiologic images.  care coordinated with ER team.

## 2022-12-28 NOTE — ED ADULT TRIAGE NOTE - CHIEF COMPLAINT QUOTE
s/p unwitnessed fall yesterday, back pain yesterday, given tylenol with relief. c/o headache, unsteady gait today. pt takes eliquis.

## 2022-12-28 NOTE — H&P ADULT - NSHPLABSRESULTS_GEN_ALL_CORE
LABS:                         8.5    8.98  )-----------( 147      ( 28 Dec 2022 19:48 )             26.3     12-28    138  |  103  |  32<H>  ----------------------------<  160<H>  4.7   |  26  |  1.12    Ca    8.5      28 Dec 2022 19:48    TPro  6.5  /  Alb  3.5  /  TBili  0.6  /  DBili  x   /  AST  19  /  ALT  17  /  AlkPhos  69  12-28    PT/INR - ( 28 Dec 2022 19:48 )   PT: 21.0 sec;   INR: 1.82 ratio         PTT - ( 28 Dec 2022 19:48 )  PTT:37.2 sec      CT Pelvis No Cont (12.28.22 @ 15:23)    IMPRESSION:    CT cervical spine: No vertebral fracture is recognized.  Advanced   degenerative disc disease and spondylosis with loss of disc height and  associated degenerative endplate changes. There is narrowing of the LEFT   C2-3, BILATERAL C3-4, C4-5, LEFT C5-6 and RIGHT C6-7 neural foramina due   to uncovertebral spurring and facet osteophytic hypertrophy.   Laminectomies are noted at C3-C5.    CT thoracic/lumbar:  Chalk-stick fracture through the L2 vertebral body   anteriorly extending into the inferior endplate posteriorly with mild   distraction of fragments.   DISH is present with calcification of   anterior longitudinal ligament.  Multilevel degenerative disc disease and   spondylosis. Posterior osteophytic ridge/disc complexes at L1-2 through   L5-S1 flatten the ventral thecal sac and narrow the BILATERAL neural   foramina and contributes to mild central stenosis at these levelsin   conjunction with facet osteophytic hypertrophy.    CT pelvis: No fracture.   Large hematoma is seen within expanded LEFT   psoas muscle extending into the LEFT iliac is muscle. Stranding in the   LEFT retroperitoneum consistent with mild retroperitoneal hemorrhage.

## 2022-12-28 NOTE — ED ADULT NURSE NOTE - OBJECTIVE STATEMENT
pt found comfortable in stretcher, pt sleepy and easily arousable. family states that "he was OOB with walker and fell, I didn't see him fall. he might have hit his head since he's complaining of a headache and back pain" pt alert and oriented x3 with slow responses. Family states that patient is more lethargic than baseline. pt denies SOB and chest pain. pt found comfortable in stretcher, pt sleepy and easily arousable. family states that "he was ambulating with walker and fell, I didn't see him fall. he might have hit his head since he's complaining of a headache and also complaining of back pain" pt alert and oriented x3 with slow responses. Family states that patient is more lethargic than baseline. wife states patient takes eliquis at home. pt denies SOB and chest pain. pt found comfortable in stretcher, pt sleepy and easily arousable. family states that "he was ambulating with walker and fell, I didn't see him fall. he might have hit his head since he's complaining of a headache and also complaining of back pain" pt alert and oriented x3 with slow responses. Family states that patient is more lethargic than baseline. wife states patient takes xarelto at home. pt denies SOB and chest pain.

## 2022-12-28 NOTE — ED PROVIDER NOTE - OBJECTIVE STATEMENT
87 years old male with wife by ems from home c/o headache and back pain after fell off the chair at dinner time yesterday. Wife sts pt has a hx of dementia and normal walks with a walker but unable to walk since this morning. 87 years old male with wife by ems from home c/o headache and back pain after fell off the chair at dinner time yesterday wife saw pt was lying on his left side on the floor next to the chair. Wife sts pt has a hx of dementia and normal walks with a walker but unable to walk since this morning. Wife sts pt is at his base line mentally. Wife sts pt has a hx of atrial fib takes Eliquis daily. 87 years old male with wife by ems from home c/o headache and back pain after fell off the chair at dinner time yesterday wife saw pt was lying on his left side on the floor next to the chair. Wife sts pt has a hx of dementia and normal walks with a walker but unable to walk since this morning. Wife sts pt is at his base line mentally. Wife sts pt has a hx of atrial fib takes Eliquis daily. Pt last dose of tylenol was last night.

## 2022-12-28 NOTE — ED PROVIDER NOTE - OBJECTIVE STATEMENT
Patient is an 87 year old male with past medical history significant for atrial fibrillation (on Xarelto) and dementia presents to the ED as a trauma transfer.  Patient had a mechanical fall 2 days prior and presented with back pain.  Patient was found to have an L2 fracture and retroperitoneal hematoma, which was reversed with KCentra.  Patient was transferred and evaluated as a level 2 trauma.

## 2022-12-28 NOTE — CHART NOTE - NSCHARTNOTEFT_GEN_A_CORE
PT was able to speak with family attending a PT STAT consult, family concerned about pain on back, wife present. Son Asad in room translating for family.    PT was able to speak with RN Ginger and MIREYA Diaz regarding his results:  CT cervical spine: No vertebral fracture is recognized.  Advanced   degenerative disc disease and spondylosis with loss of disc height and   associated degenerative endplate changes. There is narrowing of the LEFT   C2-3, BILATERAL C3-4, C4-5, LEFT C5-6 and RIGHT C6-7 neural foramina due   to uncovertebral spurring and facet osteophytic hypertrophy.   Laminectomies are noted at C3-C5.    CT thoracic/lumbar:  Chalk-stick fracture through the L2 vertebral body   anteriorly extending into the inferior endplate posteriorly with mild   distraction of fragments.   DISH is present with calcification of   anterior longitudinal ligament.  Multilevel degenerative disc disease and   spondylosis. Posterior osteophytic ridge/disc complexes at L1-2 through   L5-S1 flatten the ventral thecal sac and narrow the BILATERAL neural   foramina and contributes to mild central stenosis at these levels in   conjunction with facet osteophytic hypertrophy.    CT pelvis: No fracture.   Large hematoma is seen within expanded LEFT   psoas muscle extending into the LEFT iliac is muscle. Stranding in the   LEFT retroperitoneum consistent with mild retroperitoneal hemorrhage.    PT plans to await for MD recommendation to clarify weight bearing, Ortho consult recommended.     Tony Webb, PT

## 2022-12-28 NOTE — ED PROVIDER NOTE - CLINICAL SUMMARY MEDICAL DECISION MAKING FREE TEXT BOX
Patient is an 87 year old male with past medical history significant for atrial fibrillation (on Xarelto) and dementia presents to the ED as a trauma transfer.  Patient had previous scans, which revealed L2 fracture and retroperitoneal hematoma.  A level 2 trauma was called and patient was assessed by trauma team upon arrival.  Spine was consulted for fracture.  Patient previously received KCentra for Xarelto reversal.

## 2022-12-28 NOTE — ED PROVIDER NOTE - PROGRESS NOTE DETAILS
as wife pt is at his base line mental status. transfer center is called trauma Dr. Mei is notified and accepts pt to [facility]. as wife pt is at his base line mental status. transfer center is called trauma Dr. Mei is notified and accepts pt to [facility]. bp 124/65 hr 92 Dr. Jarrett ed attending is also notified and accepted pt.

## 2022-12-28 NOTE — CONSULT NOTE ADULT - SUBJECTIVE AND OBJECTIVE BOX
p (1480)    87M hx afib on xarelto and dementia presenting as trauma xfer. Fell off chair at dinner. Normally walks w/ walker but unable to walk since this AM. Prior scans showing L2 fx and RP hematoma w/ hypotension. Got Kcentra. CT showing L2 chalk stick fx through DISH, ant ext into inf endplate post w/ distraction of fragments. Exam: aox1, CROSS strongly at least 4+/5, reports back pain, no Stock, no clonus.     --Anticoagulation:    =====================  PAST MEDICAL HISTORY   HTN (hypertension)    Prostate cancer    Hx of radiation therapy    Cataract of left eye    Borderline diabetes    Kidney cyst, acquired    Atrial fibrillation    Cervical disc displacement    Obesity (BMI 30-39.9)      PAST SURGICAL HISTORY   Cataract extraction status of left eye    Larynx polyp      aspirin (Rash)      MEDICATIONS:  Antibiotics:    Neuro:    Other:      SOCIAL HISTORY:   Occupation:   Marital Status:     FAMILY HISTORY:  No pertinent family history in first degree relatives    FH: dementia (Mother)        ROS: Negative except per HPI    LABS:  PT/INR - ( 28 Dec 2022 14:15 )   PT: 28.8 sec;   INR: 2.40 ratio         PTT - ( 28 Dec 2022 14:15 )  PTT:37.8 sec                        10.0   9.71  )-----------( 160      ( 28 Dec 2022 14:15 )             30.4     12-28    137  |  105  |  33<H>  ----------------------------<  200<H>  4.7   |  26  |  1.31<H>    Ca    8.4<L>      28 Dec 2022 15:48    TPro  6.4  /  Alb  2.9<L>  /  TBili  0.5  /  DBili  x   /  AST  27  /  ALT  23  /  AlkPhos  68  12-28

## 2022-12-28 NOTE — ED PROVIDER NOTE - NS ED ROS FT
CONSTITUTIONAL: No weakness, fevers or chills  EYES/ENT: No visual changes;  No vertigo or throat pain   NECK: No pain or stiffness  RESPIRATORY: No cough, wheezing, hemoptysis; No shortness of breath  CARDIOVASCULAR: No chest pain or palpitations  GASTROINTESTINAL: No abdominal or epigastric pain. No nausea, vomiting, or hematemesis; No diarrhea or constipation. No melena or hematochezia.  GENITOURINARY: No dysuria, frequency or hematuria  NEUROLOGICAL: No numbness or weakness  SKIN: No itching, burning, rashes, or lesions   MSK: + back pain  All other review of systems is negative unless indicated above.

## 2022-12-28 NOTE — ED PROVIDER NOTE - CLINICAL SUMMARY MEDICAL DECISION MAKING FREE TEXT BOX
pt has a hx of demential normally walks with a walker fell yesterday and c/o headache, and unable to walk today Pt has a hx of atrial fib takes Eliquis daily will get ct of head/cervical/thoracic/LS spines and ct pelvis, possible physical therapy eval.

## 2022-12-28 NOTE — H&P ADULT - HISTORY OF PRESENT ILLNESS
TRAUMA SURGERY CONSULT NOTE  --------------------------------------------------------------------------------------------    TRAUMA ACTIVATION LEVEL:     MECHANISM OF INJURY:      [X] Blunt  	[] MVC	[X] Fall	[] Pedestrian Struck	[] Motorcycle accident      [] Penetrating  	[] Gun Shot Wound 		[] Stab Wound    GCS: 	E: 4	V: 4	M: 6    Patient is a 87y old  Male who presents with a chief complaint of mechanical fall    HPI:   86yo M h/o dementia, Afib on Xarelto, presenting as transfer from [facility] after mechanical fall. Pt had a ground level fall yesterday, and was transferred here for higher level of care.  Got Kcentra at [facility]    Primary Survey:  A - airway intact  B - bilateral breath sounds and good chest rise  C - initial BP  BP: 134/62 (12-28-22 @ 18:32) *** , HR HR: 98 (12-28-22 @ 18:32) *** , Afib palpable pulses in all extremities  D - GCS 14 on arrival (confused)  Exposure obtained      Secondary Survey:  GEN: resting comfortably in bed, in NAD  HEENT: normocephalic, non-tender to palpation, no abrasions visible, no step-offs palpated  NECK: trachea midline, non-tender to palpation at posterior midline  CHEST: non-tender to palpation across clavicles and b/l anterior ribs  BACK: non-tender to palpation along cervical, thoracic, lumbar spine midline and b/l posterior ribs; no palpable step-offs or hematomas  ABD: soft, non-distended, non-tender   PELVIS: no pelvic instability  LUE: no visible abrasions or deformities, non-tender to palpation across upper and lower arm  RUE: no visible abrasions or deformities, non-tender to palpation across upper and lower arm  LLE: no visible abrasions or deformities, non-tender to palpation across upper and lower leg. Palpable PT  RLE: no visible abrasions or deformities, non-tender to palpation across upper and lower leg. Palpable PT  NEURO: AAOx0, CN II-IX grossly intact; pupils 3mm, equal and reactive to light bilaterally    ROS:  unable to obtain due to pt mental status

## 2022-12-28 NOTE — H&P ADULT - NSHPPHYSICALEXAM_GEN_ALL_CORE
Secondary Survey:  GEN: resting comfortably in bed, in NAD  HEENT: normocephalic, non-tender to palpation, no abrasions visible, no step-offs palpated  NECK: trachea midline, non-tender to palpation at posterior midline, no pain with flexion, extension, and bilateral neck rotation  CHEST: non-tender to palpation across clavicles and b/l anterior ribs  BACK: non-tender to palpation along cervical, thoracic, lumbar spine midline and b/l posterior ribs; no palpable step-offs or hematomas  ABD: soft, non-distended, non-tender   PELVIS: no pelvic instability  RECTUM: good rectal tone  LUE: no visible abrasions or deformities, non-tender to palpation across upper and lower arm, 5/5  strength, fingers warm, well-perfused, full ROM in shoulder, elbow, wrist, and fingers, palpable radial pulse  RUE: no visible abrasions or deformities, non-tender to palpation across upper and lower arm, 5/5  strength, fingers warm, well-perfused, full ROM in shoulder, elbow, wrist, and fingers, palpable radial pulse  LLE: no visible abrasions or deformities, non-tender to palpation across upper and lower leg; full ROM in hip, knee, ankle, and toes, 5/5 dorsiflexion + plantarflexion, palpable DP pulse; warm, well-perfused  RLE: no visible abrasions or deformities, non-tender to palpation across upper and lower leg; full ROM in hip, knee, ankle, and toes, 5/5 dorsiflexion + plantarflexion, palpable DP pulse; warm, well-perfused  NEURO: AAOx4, no focal neuro deficits; CN II-IX intact; pupils 3mm, equal and reactive to light bilaterally

## 2022-12-28 NOTE — ED ADULT NURSE NOTE - OBJECTIVE STATEMENT
88YO male with PMH of HTN, Afib- Xarelto, and prostate cancer transfer from [facility] for trauma consult c/o falls. See trauma flow sheet for story.

## 2022-12-28 NOTE — ED PROVIDER NOTE - ATTENDING CONTRIBUTION TO CARE
I, Bang De Leon, performed a history and physical exam of the patient and discussed their management with the resident and/or advanced care provider. I reviewed the resident and/or advanced care provider's note and agree with the documented findings and plan of care. I was present and available for all procedures.    Patient is an 87 year old male with past medical history significant for atrial fibrillation (on Xarelto) and dementia presents to the ED as a trauma transfer.  Patient had a mechanical fall 2 days prior and presented with back pain.  Patient was found to have an L2 fracture and retroperitoneal hematoma, which was reversed with KCentra.  Patient was transferred and evaluated as a level 2 trauma.    Well appearing and in NAD, head normal appearing atraumatic, trachea midline, no respiratory distress, lungs cta bilaterally, rrr no murmurs, soft NT ND abdomen, no visible extremity deformities, Alert and orientedx1, non focal neuro exam, skin warm and dry, normal affect and mood, yo x4, no ext trauma no cspine ttp    well appearing primary 2ndary survey intact but with reversed ac for rp hemm w hypotension, screening labs ekg, close monitoring, level 2 trauma activated, trauma surg recc mr l spine, admit to nsgy for further mgmt of pain control spine fracture, discussed with nsgy will eval patient. trauma declined further contrasted imaging

## 2022-12-28 NOTE — H&P ADULT - ASSESSMENT
88yo M h/o dementia, Afib on Xarelto, presenting as transfer from [facility] after mechanical fall, found to have L2 fracture, and small associated L psoas hematoma.    Plan  - Admit to trauma surgery Dr. Conte  - [place] Card  - Appreciate neurosurg input for L2 fx  - F/u MRI L-spine  - Med rec to be completed once able to contact family/pharmacy    ACS/Trauma Surgery  Please page team for all questions Pager # [phone]  86yo M h/o dementia, Afib on Xarelto, presenting as transfer from [facility] after mechanical fall, found to have L2 fracture, and small associated L psoas hematoma.    Plan  - Admit to trauma surgery Dr. Conte  - Waleska Card  - Appreciate neurosurg input for L2 fx  - F/u MRI L-spine  - Med rec to be completed in AM - [facility] [place] [facility]    ACS/Trauma Surgery  Please page team for all questions Pager # [phone]

## 2022-12-28 NOTE — ED PROVIDER NOTE - NEUROLOGICAL COORDINATION
Subjective   Silvia Ayaka Bhatt is a 20 y.o. female.     History of Present Illness   Ms. Bhatt is a 19yo female that presents today with her mother for ER recheck for UTI. She as seen yesterday.  Sent home with Bactrim.  She has had a lot of nausea and vomiting. She has been having lower abd pain and has been so severe that she was crying.  Pain has been a little better. She has had a lot of nausea still. She has been taking zofran and that helps.  Has been tired and had decreased appetite.        Current Outpatient Prescriptions:   •  acetaminophen (TYLENOL) 325 MG tablet, Take 650 mg by mouth as needed for mild pain (1-3)., Disp: , Rfl:   •  HYDROcodone-acetaminophen (NORCO) 5-325 MG per tablet, Take 1 tablet by mouth Every 6 (Six) Hours As Needed (pain)., Disp: 15 tablet, Rfl: 0  •  ibuprofen (ADVIL,MOTRIN) 800 MG tablet, TAKE 1 TABLET BY MOUTH 3 (THREE) TIMES A DAY WITH MEALS., Disp: 90 tablet, Rfl: 2  •  ondansetron ODT (ZOFRAN-ODT) 4 MG disintegrating tablet, Take 1 tablet by mouth Every 6 (Six) Hours As Needed for Nausea or Vomiting., Disp: 10 tablet, Rfl: 0  •  sulfamethoxazole-trimethoprim (BACTRIM DS,SEPTRA DS) 800-160 MG per tablet, Take 1 tablet by mouth 2 (Two) Times a Day., Disp: 20 tablet, Rfl: 0    The following portions of the patient's history were reviewed and updated as appropriate: allergies, current medications, past family history, past medical history, past social history, past surgical history and problem list.    Review of Systems   Constitutional: Negative for activity change, appetite change, fatigue and unexpected weight change.   Eyes: Negative for visual disturbance.   Respiratory: Negative for cough, shortness of breath and wheezing.    Cardiovascular: Negative for chest pain, palpitations and leg swelling.   Gastrointestinal: Positive for abdominal pain and nausea. Negative for abdominal distention, constipation, diarrhea and vomiting.   Genitourinary: Positive for  "dysuria. Negative for flank pain.   Musculoskeletal: Negative for arthralgias, back pain, gait problem and joint swelling.   Skin: Negative for pallor, rash and wound.   Neurological: Negative for headaches.   Psychiatric/Behavioral: Negative for dysphoric mood and sleep disturbance. The patient is not nervous/anxious.        Objective    Vitals:    01/25/18 1541   BP: 106/70   Weight: 58.4 kg (128 lb 12.8 oz)   Height: 162.6 cm (64\")       Physical Exam   Constitutional: She is oriented to person, place, and time. She appears well-developed and well-nourished. No distress.   Cardiovascular: Normal rate, regular rhythm and normal heart sounds.    No murmur heard.  No LE edema.   Pulmonary/Chest: Effort normal and breath sounds normal. No respiratory distress.   Abdominal: Soft. Bowel sounds are normal. She exhibits no distension. There is tenderness in the suprapubic area.   Neurological: She is alert and oriented to person, place, and time.   Psychiatric: She has a normal mood and affect. Her behavior is normal. Judgment and thought content normal.   Nursing note and vitals reviewed.      Assessment/Plan   Problems Addressed this Visit     None      Visit Diagnoses     Urinary tract infection without hematuria, site unspecified    -  Primary        She is on bactrim and that is already getting better. Will complete abx.  Called in more zofran for her because she has had continued nausea. No fevers or other concerning symptoms.     Will let me know if not better when complete or new symptoms start.  RTC in 4-5 months or sooner PRN for annual physical.           " normal

## 2022-12-28 NOTE — ED PROVIDER NOTE - PROGRESS NOTE DETAILS
Pedro Carbone PGY3: Neuro surg recommending MRI and admission to surg given RP hematoma. Pt accepted for admission. Remains HDS.

## 2022-12-28 NOTE — ED PROVIDER NOTE - CARE PLAN
1 Principal Discharge DX:	Retroperitoneal hemorrhage  Secondary Diagnosis:	Closed compression fracture of lumbar vertebra  Secondary Diagnosis:	Chronic atrial fibrillation

## 2022-12-29 DIAGNOSIS — E11.9 TYPE 2 DIABETES MELLITUS WITHOUT COMPLICATIONS: ICD-10-CM

## 2022-12-29 DIAGNOSIS — S32.020A WEDGE COMPRESSION FRACTURE OF SECOND LUMBAR VERTEBRA, INITIAL ENCOUNTER FOR CLOSED FRACTURE: ICD-10-CM

## 2022-12-29 DIAGNOSIS — Z01.818 ENCOUNTER FOR OTHER PREPROCEDURAL EXAMINATION: ICD-10-CM

## 2022-12-29 DIAGNOSIS — Z88.6 ALLERGY STATUS TO ANALGESIC AGENT: ICD-10-CM

## 2022-12-29 DIAGNOSIS — I10 ESSENTIAL (PRIMARY) HYPERTENSION: ICD-10-CM

## 2022-12-29 DIAGNOSIS — R51.9 HEADACHE, UNSPECIFIED: ICD-10-CM

## 2022-12-29 DIAGNOSIS — I48.20 CHRONIC ATRIAL FIBRILLATION, UNSPECIFIED: ICD-10-CM

## 2022-12-29 DIAGNOSIS — E78.5 HYPERLIPIDEMIA, UNSPECIFIED: ICD-10-CM

## 2022-12-29 DIAGNOSIS — Z79.899 OTHER LONG TERM (CURRENT) DRUG THERAPY: ICD-10-CM

## 2022-12-29 DIAGNOSIS — F03.90 UNSPECIFIED DEMENTIA WITHOUT BEHAVIORAL DISTURBANCE: ICD-10-CM

## 2022-12-29 DIAGNOSIS — I48.91 UNSPECIFIED ATRIAL FIBRILLATION: ICD-10-CM

## 2022-12-29 DIAGNOSIS — S32.000A WEDGE COMPRESSION FRACTURE OF UNSPECIFIED LUMBAR VERTEBRA, INITIAL ENCOUNTER FOR CLOSED FRACTURE: ICD-10-CM

## 2022-12-29 DIAGNOSIS — M54.9 DORSALGIA, UNSPECIFIED: ICD-10-CM

## 2022-12-29 DIAGNOSIS — Z85.46 PERSONAL HISTORY OF MALIGNANT NEOPLASM OF PROSTATE: ICD-10-CM

## 2022-12-29 DIAGNOSIS — Z79.84 LONG TERM (CURRENT) USE OF ORAL HYPOGLYCEMIC DRUGS: ICD-10-CM

## 2022-12-29 DIAGNOSIS — Z79.01 LONG TERM (CURRENT) USE OF ANTICOAGULANTS: ICD-10-CM
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
## 2022-12-29 DIAGNOSIS — K66.1 HEMOPERITONEUM: ICD-10-CM

## 2022-12-29 DIAGNOSIS — T14.8XXA OTHER INJURY OF UNSPECIFIED BODY REGION, INITIAL ENCOUNTER: ICD-10-CM

## 2022-12-29 DIAGNOSIS — Z20.822 CONTACT WITH AND (SUSPECTED) EXPOSURE TO COVID-19: ICD-10-CM

## 2022-12-29 LAB
A1C WITH ESTIMATED AVERAGE GLUCOSE RESULT: 6.8 % — HIGH (ref 4–5.6)
ANION GAP SERPL CALC-SCNC: 11 MMOL/L — SIGNIFICANT CHANGE UP (ref 5–17)
BUN SERPL-MCNC: 30 MG/DL — HIGH (ref 7–23)
CALCIUM SERPL-MCNC: 8.7 MG/DL — SIGNIFICANT CHANGE UP (ref 8.4–10.5)
CHLORIDE SERPL-SCNC: 104 MMOL/L — SIGNIFICANT CHANGE UP (ref 96–108)
CO2 SERPL-SCNC: 24 MMOL/L — SIGNIFICANT CHANGE UP (ref 22–31)
CREAT SERPL-MCNC: 0.95 MG/DL — SIGNIFICANT CHANGE UP (ref 0.5–1.3)
EGFR: 78 ML/MIN/1.73M2 — SIGNIFICANT CHANGE UP
ESTIMATED AVERAGE GLUCOSE: 148 MG/DL — HIGH (ref 68–114)
GLUCOSE BLDC GLUCOMTR-MCNC: 146 MG/DL — HIGH (ref 70–99)
GLUCOSE BLDC GLUCOMTR-MCNC: 151 MG/DL — HIGH (ref 70–99)
GLUCOSE BLDC GLUCOMTR-MCNC: 163 MG/DL — HIGH (ref 70–99)
GLUCOSE BLDC GLUCOMTR-MCNC: 180 MG/DL — HIGH (ref 70–99)
GLUCOSE BLDC GLUCOMTR-MCNC: 183 MG/DL — HIGH (ref 70–99)
GLUCOSE SERPL-MCNC: 143 MG/DL — HIGH (ref 70–99)
HCT VFR BLD CALC: 27.4 % — LOW (ref 39–50)
HCT VFR BLD CALC: 28.1 % — LOW (ref 39–50)
HGB BLD-MCNC: 9 G/DL — LOW (ref 13–17)
HGB BLD-MCNC: 9.1 G/DL — LOW (ref 13–17)
LACTATE SERPL-SCNC: 2 MMOL/L — SIGNIFICANT CHANGE UP (ref 0.5–2)
LACTATE SERPL-SCNC: 2.6 MMOL/L — HIGH (ref 0.5–2)
MAGNESIUM SERPL-MCNC: 2 MG/DL — SIGNIFICANT CHANGE UP (ref 1.6–2.6)
MCHC RBC-ENTMCNC: 30.3 PG — SIGNIFICANT CHANGE UP (ref 27–34)
MCHC RBC-ENTMCNC: 30.7 PG — SIGNIFICANT CHANGE UP (ref 27–34)
MCHC RBC-ENTMCNC: 32.4 GM/DL — SIGNIFICANT CHANGE UP (ref 32–36)
MCHC RBC-ENTMCNC: 32.8 GM/DL — SIGNIFICANT CHANGE UP (ref 32–36)
MCV RBC AUTO: 93.5 FL — SIGNIFICANT CHANGE UP (ref 80–100)
MCV RBC AUTO: 93.7 FL — SIGNIFICANT CHANGE UP (ref 80–100)
NRBC # BLD: 0 /100 WBCS — SIGNIFICANT CHANGE UP (ref 0–0)
NRBC # BLD: 0 /100 WBCS — SIGNIFICANT CHANGE UP (ref 0–0)
PHOSPHATE SERPL-MCNC: 3.4 MG/DL — SIGNIFICANT CHANGE UP (ref 2.5–4.5)
PLATELET # BLD AUTO: 146 K/UL — LOW (ref 150–400)
PLATELET # BLD AUTO: 147 K/UL — LOW (ref 150–400)
POTASSIUM SERPL-MCNC: 5 MMOL/L — SIGNIFICANT CHANGE UP (ref 3.5–5.3)
POTASSIUM SERPL-SCNC: 5 MMOL/L — SIGNIFICANT CHANGE UP (ref 3.5–5.3)
RBC # BLD: 2.93 M/UL — LOW (ref 4.2–5.8)
RBC # BLD: 3 M/UL — LOW (ref 4.2–5.8)
RBC # FLD: 13.2 % — SIGNIFICANT CHANGE UP (ref 10.3–14.5)
RBC # FLD: 13.2 % — SIGNIFICANT CHANGE UP (ref 10.3–14.5)
SARS-COV-2 RNA SPEC QL NAA+PROBE: SIGNIFICANT CHANGE UP
SODIUM SERPL-SCNC: 139 MMOL/L — SIGNIFICANT CHANGE UP (ref 135–145)
WBC # BLD: 10.34 K/UL — SIGNIFICANT CHANGE UP (ref 3.8–10.5)
WBC # BLD: 9.88 K/UL — SIGNIFICANT CHANGE UP (ref 3.8–10.5)
WBC # FLD AUTO: 10.34 K/UL — SIGNIFICANT CHANGE UP (ref 3.8–10.5)
WBC # FLD AUTO: 9.88 K/UL — SIGNIFICANT CHANGE UP (ref 3.8–10.5)

## 2022-12-29 PROCEDURE — 99223 1ST HOSP IP/OBS HIGH 75: CPT | Mod: GC

## 2022-12-29 PROCEDURE — 99223 1ST HOSP IP/OBS HIGH 75: CPT

## 2022-12-29 PROCEDURE — 72148 MRI LUMBAR SPINE W/O DYE: CPT | Mod: 26

## 2022-12-29 PROCEDURE — 99232 SBSQ HOSP IP/OBS MODERATE 35: CPT

## 2022-12-29 RX ORDER — ACETAMINOPHEN 500 MG
1000 TABLET ORAL ONCE
Refills: 0 | Status: COMPLETED | OUTPATIENT
Start: 2022-12-29 | End: 2022-12-29

## 2022-12-29 RX ORDER — DEXTROSE 50 % IN WATER 50 %
25 SYRINGE (ML) INTRAVENOUS ONCE
Refills: 0 | Status: DISCONTINUED | OUTPATIENT
Start: 2022-12-29 | End: 2022-12-30

## 2022-12-29 RX ORDER — SODIUM CHLORIDE 9 MG/ML
1000 INJECTION INTRAMUSCULAR; INTRAVENOUS; SUBCUTANEOUS
Refills: 0 | Status: DISCONTINUED | OUTPATIENT
Start: 2022-12-29 | End: 2022-12-30

## 2022-12-29 RX ORDER — SODIUM CHLORIDE 9 MG/ML
1000 INJECTION, SOLUTION INTRAVENOUS
Refills: 0 | Status: DISCONTINUED | OUTPATIENT
Start: 2022-12-29 | End: 2022-12-30

## 2022-12-29 RX ORDER — GLUCAGON INJECTION, SOLUTION 0.5 MG/.1ML
1 INJECTION, SOLUTION SUBCUTANEOUS ONCE
Refills: 0 | Status: DISCONTINUED | OUTPATIENT
Start: 2022-12-29 | End: 2022-12-30

## 2022-12-29 RX ORDER — ACETAMINOPHEN 500 MG
650 TABLET ORAL EVERY 6 HOURS
Refills: 0 | Status: DISCONTINUED | OUTPATIENT
Start: 2022-12-29 | End: 2022-12-30

## 2022-12-29 RX ORDER — PHYTONADIONE (VIT K1) 5 MG
10 TABLET ORAL ONCE
Refills: 0 | Status: COMPLETED | OUTPATIENT
Start: 2022-12-29 | End: 2022-12-29

## 2022-12-29 RX ORDER — DEXTROSE 50 % IN WATER 50 %
12.5 SYRINGE (ML) INTRAVENOUS ONCE
Refills: 0 | Status: DISCONTINUED | OUTPATIENT
Start: 2022-12-29 | End: 2022-12-30

## 2022-12-29 RX ORDER — INSULIN LISPRO 100/ML
VIAL (ML) SUBCUTANEOUS AT BEDTIME
Refills: 0 | Status: DISCONTINUED | OUTPATIENT
Start: 2022-12-29 | End: 2022-12-30

## 2022-12-29 RX ORDER — INSULIN LISPRO 100/ML
VIAL (ML) SUBCUTANEOUS
Refills: 0 | Status: DISCONTINUED | OUTPATIENT
Start: 2022-12-29 | End: 2022-12-30

## 2022-12-29 RX ORDER — PHYTONADIONE (VIT K1) 5 MG
5 TABLET ORAL ONCE
Refills: 0 | Status: DISCONTINUED | OUTPATIENT
Start: 2022-12-29 | End: 2022-12-29

## 2022-12-29 RX ORDER — ENOXAPARIN SODIUM 100 MG/ML
40 INJECTION SUBCUTANEOUS EVERY 24 HOURS
Refills: 0 | Status: DISCONTINUED | OUTPATIENT
Start: 2022-12-29 | End: 2022-12-29

## 2022-12-29 RX ORDER — DEXTROSE 50 % IN WATER 50 %
15 SYRINGE (ML) INTRAVENOUS ONCE
Refills: 0 | Status: DISCONTINUED | OUTPATIENT
Start: 2022-12-29 | End: 2022-12-30

## 2022-12-29 RX ADMIN — Medication 1000 MILLIGRAM(S): at 14:00

## 2022-12-29 RX ADMIN — Medication 400 MILLIGRAM(S): at 13:04

## 2022-12-29 RX ADMIN — Medication 1: at 13:03

## 2022-12-29 RX ADMIN — Medication 102 MILLIGRAM(S): at 21:12

## 2022-12-29 RX ADMIN — Medication 1: at 18:19

## 2022-12-29 RX ADMIN — Medication 400 MILLIGRAM(S): at 21:13

## 2022-12-29 RX ADMIN — Medication 1000 MILLIGRAM(S): at 21:05

## 2022-12-29 NOTE — CONSULT NOTE ADULT - SUBJECTIVE AND OBJECTIVE BOX
TRAUMA/ACUTE CARE SURGERY - HOSPITAL MEDICINE CO-MANAGEMENT INITIAL VISIT NOTE    CHIEF COMPLAINT: Patient is a 87y old  Male who presents with a chief complaint of Transfer level 2 trauma (29 Dec 2022 09:41)      HPI:   86yo M h/o dementia, Afib on Xarelto, BPH, HTN, HLD, DM2 presenting as transfer from [facility] after mechanical fall. Pt had a ground level fall yesterday, and was transferred here for higher level of care.  Got Kcentra at [facility]. Pt found to have L2 fracture, and small associated L psoas hematoma.         (28 Dec 2022 20:11)      History limited due to: [ x] Dementia  [ ] Delirium  [ ] Condition    Pain Symptoms if applicable:             	                         none	   mild         moderate         severe  	                            0	    1-3	     4-6	         7-10  Pain:  Location:	  Modifying factors:	  Associated symptoms:	    Allergies    aspirin (Rash)    Intolerances        HOME MEDICATIONS: [ ] Reviewed    MEDICATIONS  (STANDING):  acetaminophen   IVPB .. 1000 milliGRAM(s) IV Intermittent once  dextrose 5%. 1000 milliLiter(s) (100 mL/Hr) IV Continuous <Continuous>  dextrose 5%. 1000 milliLiter(s) (50 mL/Hr) IV Continuous <Continuous>  dextrose 50% Injectable 25 Gram(s) IV Push once  dextrose 50% Injectable 12.5 Gram(s) IV Push once  dextrose 50% Injectable 25 Gram(s) IV Push once  glucagon  Injectable 1 milliGRAM(s) IntraMuscular once  insulin lispro (ADMELOG) corrective regimen sliding scale   SubCutaneous three times a day before meals  insulin lispro (ADMELOG) corrective regimen sliding scale   SubCutaneous at bedtime    MEDICATIONS  (PRN):  acetaminophen     Tablet .. 650 milliGRAM(s) Oral every 6 hours PRN Mild Pain (1 - 3)  dextrose Oral Gel 15 Gram(s) Oral once PRN Blood Glucose LESS THAN 70 milliGRAM(s)/deciliter      PAST MEDICAL & SURGICAL HISTORY:  HTN (hypertension)      Prostate cancer  2011      Hx of radiation therapy  2011      Cataract of left eye      Borderline diabetes      Kidney cyst, acquired  left      Atrial fibrillation      Cervical disc displacement      Obesity (BMI 30-39.9)      Cataract extraction status of left eye      Larynx polyp  Excision of larynx polyp; 2009      [ ] Reviewed     SOCIAL HISTORY:  Residence: [ ] [facility]  [ ] SNF  [x ] Community  [ ] Substance abuse:   [ ] Tobacco:   [ ] Alcohol use:     FAMILY HISTORY:  FH: dementia (Mother)    [ ] No pertinent family history in first degree relatives     REVIEW OF SYSTEMS:        [  ] All other ROS negative  [ x ] Unable to obtain due to poor mental status    PHYSICAL EXAM:    Vital Signs Last 24 Hrs  T(C): 36.6 (29 Dec 2022 11:56), Max: 37.7 (29 Dec 2022 08:10)  T(F): 97.8 (29 Dec 2022 11:56), Max: 99.9 (29 Dec 2022 08:10)  HR: 97 (29 Dec 2022 11:56) (91 - 109)  BP: 129/78 (29 Dec 2022 11:56) (88/55 - 149/80)  BP(mean): --  RR: 18 (29 Dec 2022 11:56) (17 - 25)  SpO2: 95% (29 Dec 2022 11:56) (94% - 99%)    Parameters below as of 29 Dec 2022 11:56  Patient On (Oxygen Delivery Method): room air        GENERAL: NAD, well-developed  HEAD:  Atraumatic, Normocephalic  EYES:  conjunctiva and sclera clear  ENMT: Moist mucous membranes  NECK: No JVD  RESPIRATORY: Clear to auscultation bilaterally; No rales, rhonchi, wheezing, or rubs  CARDIOVASCULAR: Regular rate and rhythm; S1S2  GASTROINTESTINAL: Soft, Nontender, Nondistended; Bowel sounds present  EXTREMITIES:  2+ Peripheral Pulses  NERVOUS SYSTEM:  Alert & Oriented X0  LABS:                        9.1    10.34 )-----------( 147      ( 29 Dec 2022 02:15 )             28.1     Hemoglobin: 9.1 g/dL (12-29 @ 02:15)  Hemoglobin: 8.5 g/dL (12-28 @ 19:48)  Hemoglobin: 10.0 g/dL (12-28 @ 14:15)    12-29    139  |  104  |  30<H>  ----------------------------<  143<H>  5.0   |  24  |  0.95    Ca    8.7      29 Dec 2022 02:15  Phos  3.4     12-29  Mg     2.0     12-29    TPro  6.5  /  Alb  3.5  /  TBili  0.6  /  DBili  x   /  AST  19  /  ALT  17  /  AlkPhos  69  12-28    PT/INR - ( 28 Dec 2022 19:48 )   PT: 21.0 sec;   INR: 1.82 ratio         PTT - ( 28 Dec 2022 19:48 )  PTT:37.2 sec    CAPILLARY BLOOD GLUCOSE      POCT Blood Glucose.: 146 mg/dL (29 Dec 2022 09:27)    Microbiology     RADIOLOGY & ADDITIONAL STUDIES:    EKG:   Personally Reviewed:  [ ] YES     Imaging:   Personally Reviewed:  [ ] YES               [ ] Consultant(s) Notes Reviewed  [x] Care Discussed with Consultants/Other Providers: Trauma Team    Functional Assessment: [ ] Independent  [ ] Assistance  [ ] Total care  [ ] Non-ambulatory    [ ] Fall risks identified:    [ ] High risk medications identified:    [ ] Probable osteoporosis    [ ] Possible osteomalacia    [x ] Increased delirium risk    [x ] Delirium and other risks can be reduced by:          -early ambulation          -minimizing "tethers" - IV, oxygen, catheters, etc          -avoiding hypnotics and sedatives          -maintaining hydration/nutrition          -avoid anticholinergics - diphenhydramine, etc          -pain control          -supportive environment    Advanced Directives: [ ] DNR  [ ] No feeding tube  [ ] MOLST in chart  [ ] MOLST completed today  [ ] Unknown

## 2022-12-29 NOTE — ADVANCED PRACTICE NURSE CONSULT - RECOMMEDATIONS
Will recommend the followin. Sacrum, b/l buttocks: continue to monitor, routine pericare with Fabrizio  2. Continue to encourage mobility, T&P  3. complete [place] boots  4. Seat cushion when OOB to chair  5. nutrition consult  Tx plan discussed with RN

## 2022-12-29 NOTE — PROGRESS NOTE ADULT - SUBJECTIVE AND OBJECTIVE BOX
Trauma Surgery Progress Note  Patient is a 87y old  Male who presents with a chief complaint of Transfer level 2 trauma (29 Dec 2022 05:18)      INTERVAL EVENTS: No acute events overnight.  SUBJECTIVE: Patient seen and examined at bedside with surgical team, patient without complaints. Pt reports no pain, however son at bedside says that he is in pain whenever he moves.    OBJECTIVE:    Vital Signs Last 24 Hrs  T(C): 37.1 (29 Dec 2022 08:38), Max: 37.7 (29 Dec 2022 08:10)  T(F): 98.7 (29 Dec 2022 08:38), Max: 99.9 (29 Dec 2022 08:10)  HR: 102 (29 Dec 2022 08:10) (82 - 109)  BP: 143/81 (29 Dec 2022 08:10) (88/55 - 149/80)  BP(mean): --  RR: 18 (29 Dec 2022 08:10) (17 - 25)  SpO2: 97% (29 Dec 2022 08:10) (94% - 99%)    Parameters below as of 29 Dec 2022 08:10  Patient On (Oxygen Delivery Method): room air    I&O's Detail  MEDICATIONS  (STANDING):  dextrose 5%. 1000 milliLiter(s) (50 mL/Hr) IV Continuous <Continuous>  dextrose 5%. 1000 milliLiter(s) (100 mL/Hr) IV Continuous <Continuous>  dextrose 50% Injectable 25 Gram(s) IV Push once  dextrose 50% Injectable 12.5 Gram(s) IV Push once  dextrose 50% Injectable 25 Gram(s) IV Push once  glucagon  Injectable 1 milliGRAM(s) IntraMuscular once  insulin lispro (ADMELOG) corrective regimen sliding scale   SubCutaneous three times a day before meals  insulin lispro (ADMELOG) corrective regimen sliding scale   SubCutaneous at bedtime    MEDICATIONS  (PRN):  acetaminophen     Tablet .. 650 milliGRAM(s) Oral every 6 hours PRN Mild Pain (1 - 3)  dextrose Oral Gel 15 Gram(s) Oral once PRN Blood Glucose LESS THAN 70 milliGRAM(s)/deciliter      PHYSICAL EXAM:  GEN: resting comfortably in bed, in NAD  HEENT: normocephalic, non-tender to palpation, no abrasions visible, no step-offs palpated  NECK: trachea midline, non-tender to palpation at posterior midline  CHEST: non-tender to palpation across clavicles and b/l anterior ribs  ABD: soft, non-distended, non-tender   NEURO: AAOx0, CN II-IX grossly intact    LABS:                        9.1    10.34 )-----------( 147      ( 29 Dec 2022 02:15 )             28.1     12-29    139  |  104  |  30<H>  ----------------------------<  143<H>  5.0   |  24  |  0.95    Ca    8.7      29 Dec 2022 02:15  Phos  3.4     12-29  Mg     2.0     12-29    TPro  6.5  /  Alb  3.5  /  TBili  0.6  /  DBili  x   /  AST  19  /  ALT  17  /  AlkPhos  69  12-28    PT/INR - ( 28 Dec 2022 19:48 )   PT: 21.0 sec;   INR: 1.82 ratio         PTT - ( 28 Dec 2022 19:48 )  PTT:37.2 sec  LIVER FUNCTIONS - ( 28 Dec 2022 19:48 )  Alb: 3.5 g/dL / Pro: 6.5 g/dL / ALK PHOS: 69 U/L / ALT: 17 U/L / AST: 19 U/L / GGT: x             ABO Interpretation: A (12-29-22 @ 02:07)  ABO Rh Confirmation: A POS (12-28-22 @ 17:57)      IMAGING:

## 2022-12-29 NOTE — PROGRESS NOTE ADULT - ASSESSMENT
87M hx afib on xarelto and dementia presenting as trauma xfer. Fell off chair at dinner. Normally walks w/ walker but unable to walk since this AM. Prior scans showing L2 fx and RP hematoma w/ hypotension. Got Kcentra. 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 Exam: aox1, CROSS strongly at least 4+/5, reports back pain, no Stock, no clonus.   -adm trauma  -MRI L spine  -Bedrest and spine precautions until MRI done and reviewed by nsgy  -Hold AC/AP, if retroperitoneal hematoma remains stable and the patient is otherwise medically optimized, the patient would likely benefit from percutaneous fusion in order to lift bedrest/spine precautions

## 2022-12-29 NOTE — CONSULT NOTE ADULT - PROBLEM SELECTOR RECOMMENDATION 9
Pt found to have L2 fracture   Check MRI L spine  Bedrest and spine precautions until MRI done and reviewed by nsgy  Waleska carbajal   Neurosurgery follow up PCP Dr. Shasta Victor  [phone]  Called [facility] [phone] to verify meds   Home meds: donepezil 10mg, sertraline 25mg, metformin 500mg qd, xaretlo 20mg, memantine 5mg bid, flomax 0.4mg, lisinopril 5mg, simvastatin 10mg

## 2022-12-29 NOTE — PATIENT PROFILE ADULT - SURGICAL SITE INCISION
From: Eloy Darby  To: Vick Hester MD  Sent: 6/9/2017 4:44 PM CDT  Subject: Follow up at [facility]    Dr. Hester,    I'm unable to access Dr. Johnston' notes from my visit with him at [facility] that included a doctor he wanted me to visit prior to scheduling the [facility] Procedure. I can't seem to find it on any of my visit summaries, but know that you were able to access it. Could you possibly forward that doctor's info to me?    Regards,    Eloy Darby  
no

## 2022-12-29 NOTE — PATIENT PROFILE ADULT - DO YOU FEEL THREATENED BY OTHERS?
Per Dr. Darline Renteria verbal instruction, draw up 1.5mL 2% Lidocanine and 1.5mL Marcaine 0.5% for Left great toe no

## 2022-12-29 NOTE — ADVANCED PRACTICE NURSE CONSULT - REASON FOR CONSULT
Requested by staff to assess skin status of pt a/w a pressure injury. PMH is noted:  86yo M h/o dementia, Afib on Xarelto, presenting as transfer from [facility] after mechanical fall. Pt had a ground level fall yesterday, and was transferred here for higher level of care.    PAST MEDICAL HISTORY:  Atrial fibrillation     Borderline diabetes     Cataract of left eye     Cervical disc displacement     HTN (hypertension)     Hx of radiation therapy 2011    Kidney cyst, acquired left    Obesity (BMI 30-39.9)     Prostate cancer 2011.     PAST SURGICAL HISTORY:  Cataract extraction status of left eye     Larynx polyp Excision of larynx polyp; 2009.

## 2022-12-29 NOTE — CONSULT NOTE ADULT - PROBLEM SELECTOR RECOMMENDATION 2
EKG no st/t changes   RCRI Class 1 risk of major adverse cardiac events   Pending cardiology eval   Does not require further cardiac testing prior to OR

## 2022-12-29 NOTE — ADVANCED PRACTICE NURSE CONSULT - ASSESSMENT
The pt was encountered on [employer]. Mr De was in a [employer] P 500 support surface- he needs assistance with T&P- he is reluctant to turn due to his back pain but did so with encouragement. His son was at the bedside and visualized the skin.  On the sacurm and b/l buttocks was an area of intact skin with a dusky purple tone- it measured 4cmx 4cm x0cm- this may be a deep tissue injury versus a bruise. Will recommend to continue to monitor and to apply Fabrizio with pericare.  I explained the nature of the deep tissue injury and how it evolves- I also explained how we prevent pressure injuries: the specialty bed, T&P, boots, care of the skin and nutrition.  The pt has a condom catheter in place to divert urine from the skin. As he was a/w a deep tissue injury, will request a nutrition consult for further evaluation

## 2022-12-29 NOTE — CONSULT NOTE ADULT - PROBLEM SELECTOR RECOMMENDATION 3
Found to have large hematoma within expanded left psoas muscle extending into the left iliac is muscle.   Trend h/h   Care per sx

## 2022-12-29 NOTE — CONSULT NOTE ADULT - ASSESSMENT
86yo M h/o dementia, Afib on Xarelto, BPH, HTN, HLD, DM2 presenting as transfer from [facility] after mechanical fall. Pt found to have L2 fracture, and small associated L psoas hematoma.

## 2022-12-29 NOTE — CONSULT NOTE ADULT - ASSESSMENT
87M w/ hx of dementia, AF (on AC), HTN, presented initially after a fall, found to have L2 fracture and RP hematoma. Surgical team planning preop, cardiology consulted for preop evaluation.    The pt's RCRI is calculated to be 1 (6.0%) and his Clay risk score is 1.0%, making him elevated risk for his indicated spinal surgery. His METS are <4. Prior to admission, he did not have symptoms of ischemic angina or HF, and he is currently euvolemic. Would not recommend further cardiac work-up prior to his needed surgery, can proceed if indicated as per his surgical team.    Medications recs:  - agree with holding home xarelto AC given bleed, can resume once deemed safe to by surgical team.    Dorian Walker MD  Cardiology Fellow - PGY4    Please check [email or web address] password: [email or web address]" for cardiology service schedule and contact information.  87M w/ hx of dementia, AF (on AC), HTN, presented initially after a fall, found to have L2 fracture and RP hematoma.   Surgical team planning preop, cardiology consulted for preop evaluation.      REC:  The pt's RCRI is calculated to be 1 (6.0%) and his Clay risk score is 1.0%, making him elevated risk for his indicated spinal surgery. His METS are <4. Prior to admission, he did not have symptoms of ischemic angina or HF, and he is currently euvolemic. Would not recommend further cardiac work-up prior to his necessary surgery, can proceed if indicated as per his surgical team.    Medications recs:  - agree with holding home Xarelto AC given bleed, can resume once deemed safe to by surgical team.    Dorian Walker MD  Cardiology Fellow - PGY4    Plan discussed with cardiology fellow.  Patient seen and examined.  Hx., exam and labs as above.  I agree with the assessment and recommendations, which I have reviewed and edited where appropriate.  Darren Mendoza M.D.  Cardiology Attending, Consult Service    For Cardiology consults and questions, all Cardiology service information can be found 24/7 on [email or web address] - use password: [email or web address] to log in.

## 2022-12-29 NOTE — PROGRESS NOTE ADULT - ASSESSMENT
86yo M h/o dementia, Afib on Xarelto, presenting as transfer from [facility] after mechanical fall, found to have L2 fracture, and small associated L psoas hematoma.    Plan:    - Hold Xarelto  - BID CBC  - Appreciate neurosurg input for L2 fx  - F/u MRI L-spine    ACS/Trauma Surgery  Pager # [phone]  86yo M h/o dementia, Afib on Xarelto, presenting as transfer from [facility] after mechanical fall, found to have L2 fracture, and small associated L psoas hematoma.    Plan:    - Hold Xarelto  - BID CBC  - Appreciate neurosurg input for L2 fx, no contraindication for potential surgery from a trauma standpoint  - F/u MRI L-spine    ACS/Trauma Surgery  Pager # [phone]

## 2022-12-29 NOTE — PROGRESS NOTE ADULT - SUBJECTIVE AND OBJECTIVE BOX
Patient seen and examined at bedside.    --Anticoagulation--    T(C): 37.2 (12-29-22 @ 04:30), Max: 37.2 (12-29-22 @ 04:30)  HR: 106 (12-29-22 @ 04:30) (82 - 109)  BP: 138/85 (12-29-22 @ 04:30) (88/55 - 149/80)  RR: 18 (12-29-22 @ 04:30) (17 - 25)  SpO2: 96% (12-29-22 @ 04:30) (94% - 99%)  Wt(kg): --    Exam: AOx1 (dementia) o/w CROSS very strongly , no annette, no clonus, German speaking

## 2022-12-29 NOTE — CONSULT NOTE ADULT - SUBJECTIVE AND OBJECTIVE BOX
Cardiology Consult Note   [Please check [email or web address] password: [email or web address]" for cardiology service schedule and contact information]    HPI:  87M w/ hx of dementia, AF (on AC), HTN, presented initially after a fall, found to have L2 fracture and RP hematoma. Surgical team planning preop, cardiology consulted for preop evaluation.    Hx obtained from family at bedside. At baseline the pt is able to ambulate slowly short distances around home with a walker and uses few stairs with assistance. With this exertion, he has not reported chest pain, shortness of breath, palpitations, light-headed or fainting. Has also not experienced orthopnea/pnd or LE swelling. He is partially independent in his ADL's. He follows regularly with his cardiologist and takes his medications as prescribed.      PAST MEDICAL & SURGICAL HISTORY:  HTN (hypertension)      Prostate cancer        Hx of radiation therapy        Cataract of left eye      Borderline diabetes      Kidney cyst, acquired  left      Atrial fibrillation      Cervical disc displacement      Obesity (BMI 30-39.9)      Cataract extraction status of left eye      Larynx polyp  Excision of larynx polyp;         FAMILY HISTORY:  FH: dementia (Mother)      SOCIAL HISTORY:  unchanged    MEDICATIONS:        acetaminophen     Tablet .. 650 milliGRAM(s) Oral every 6 hours PRN      dextrose 50% Injectable 25 Gram(s) IV Push once  dextrose 50% Injectable 12.5 Gram(s) IV Push once  dextrose 50% Injectable 25 Gram(s) IV Push once  dextrose Oral Gel 15 Gram(s) Oral once PRN  glucagon  Injectable 1 milliGRAM(s) IntraMuscular once  insulin lispro (ADMELOG) corrective regimen sliding scale   SubCutaneous three times a day before meals  insulin lispro (ADMELOG) corrective regimen sliding scale   SubCutaneous at bedtime    dextrose 5%. 1000 milliLiter(s) IV Continuous <Continuous>  dextrose 5%. 1000 milliLiter(s) IV Continuous <Continuous>      REVIEW OF SYSTEMS:  CV: chest pain (-), palpitation (-), orthopnea (-), PND (-), edema (-)  PULM: SOB (-), cough (-), wheezing (-), hemoptysis (-).   CONST: fever (-), chills (-) or fatigue (-)  GI: abdominal distension (-), abdominal pain (-) , nausea/vomiting (-), hematemesis, (-), melena (-), hematochezia (-)  : dysuria (-), frequency (-), hematuria (-).   NEURO: numbness (-), weakness (-), dizziness (-)  SKIN: itching (-), rash (-)  HEENT:  visual changes (-); vertigo or throat pain (-);  neck stiffness (-)     All other review of systems is negative unless indicated above.   -------------------------------------------------------------------------------------------  PHYSICAL EXAM:  T(C): 36.4 (22 @ 16:49), Max: 37.7 (22 @ 08:10)  HR: 114 (22 @ 16:49) (97 - 114)  BP: 151/75 (22 @ 16:49) (129/78 - 151/75)  RR: 18 (22 @ 16:49) (18 - 20)  SpO2: 94% (22 @ 16:49) (94% - 98%)  Wt(kg): --  I&O's Summary    29 Dec 2022 07:01  -  29 Dec 2022 18:01  --------------------------------------------------------  IN: 440 mL / OUT: 1050 mL / NET: -610 mL        GENERAL: NAD, lying in bed  HEAD:  Atraumatic, Normocephalic.  EYES: EOMI, PERRLA, conjunctiva and sclera clear.  ENT: Moist mucous membranes.  NECK: Supple, No JVD.  CHEST/LUNG: Clear to auscultation bilaterally; No rales, rhonchi, wheezing, or rubs. Unlabored respirations.  HEART: irregularly irregular; No murmurs, rubs, or gallops.  ABDOMEN: Bowel sounds present; Soft, Nontender, Nondistended.   EXTREMITIES:  2+ Peripheral Pulses, brisk capillary refill. No clubbing, cyanosis, or edema.  PSYCH: Normal affect.  SKIN: No rashes or lesions.    -------------------------------------------------------------------------------------------  LABS:                          9.0    9.88  )-----------( 146      ( 29 Dec 2022 15:43 )             27.4         139  |  104  |  30<H>  ----------------------------<  143<H>  5.0   |  24  |  0.95    Ca    8.7      29 Dec 2022 02:15  Phos  3.4       Mg     2.0         TPro  6.5  /  Alb  3.5  /  TBili  0.6  /  DBili  x   /  AST  19  /  ALT  17  /  AlkPhos  69      PT/INR - ( 28 Dec 2022 19:48 )   PT: 21.0 sec;   INR: 1.82 ratio         PTT - ( 28 Dec 2022 19:48 )  PTT:37.2 sec          acetaminophen     Tablet .. 650 milliGRAM(s) Oral every 6 hours PRN      -------------------------------------------------------------------------------------------  EC22: AF w/ PVC's vs aberrant beat, normal axis, normal intervals, nonspecific T wave changes    -------------------------------------------------------------------------------------------

## 2022-12-30 ENCOUNTER — TRANSCRIPTION ENCOUNTER (OUTPATIENT)
Age: 87
End: 2022-12-30

## 2022-12-30 LAB
ANION GAP SERPL CALC-SCNC: 10 MMOL/L — SIGNIFICANT CHANGE UP (ref 5–17)
ANION GAP SERPL CALC-SCNC: 11 MMOL/L — SIGNIFICANT CHANGE UP (ref 5–17)
APPEARANCE UR: CLEAR — SIGNIFICANT CHANGE UP
APTT BLD: 20.2 SEC — LOW (ref 27.5–35.5)
BILIRUB UR-MCNC: NEGATIVE — SIGNIFICANT CHANGE UP
BUN SERPL-MCNC: 22 MG/DL — SIGNIFICANT CHANGE UP (ref 7–23)
BUN SERPL-MCNC: 24 MG/DL — HIGH (ref 7–23)
CALCIUM SERPL-MCNC: 9.1 MG/DL — SIGNIFICANT CHANGE UP (ref 8.4–10.5)
CALCIUM SERPL-MCNC: 9.1 MG/DL — SIGNIFICANT CHANGE UP (ref 8.4–10.5)
CHLORIDE SERPL-SCNC: 104 MMOL/L — SIGNIFICANT CHANGE UP (ref 96–108)
CHLORIDE SERPL-SCNC: 104 MMOL/L — SIGNIFICANT CHANGE UP (ref 96–108)
CO2 SERPL-SCNC: 24 MMOL/L — SIGNIFICANT CHANGE UP (ref 22–31)
CO2 SERPL-SCNC: 24 MMOL/L — SIGNIFICANT CHANGE UP (ref 22–31)
COLOR SPEC: YELLOW — SIGNIFICANT CHANGE UP
CREAT SERPL-MCNC: 0.89 MG/DL — SIGNIFICANT CHANGE UP (ref 0.5–1.3)
CREAT SERPL-MCNC: 0.9 MG/DL — SIGNIFICANT CHANGE UP (ref 0.5–1.3)
DIFF PNL FLD: NEGATIVE — SIGNIFICANT CHANGE UP
EGFR: 83 ML/MIN/1.73M2 — SIGNIFICANT CHANGE UP
EGFR: 83 ML/MIN/1.73M2 — SIGNIFICANT CHANGE UP
GLUCOSE BLDC GLUCOMTR-MCNC: 136 MG/DL — HIGH (ref 70–99)
GLUCOSE BLDC GLUCOMTR-MCNC: 162 MG/DL — HIGH (ref 70–99)
GLUCOSE BLDC GLUCOMTR-MCNC: 173 MG/DL — HIGH (ref 70–99)
GLUCOSE BLDC GLUCOMTR-MCNC: 228 MG/DL — HIGH (ref 70–99)
GLUCOSE SERPL-MCNC: 140 MG/DL — HIGH (ref 70–99)
GLUCOSE SERPL-MCNC: 187 MG/DL — HIGH (ref 70–99)
GLUCOSE UR QL: NEGATIVE — SIGNIFICANT CHANGE UP
HCT VFR BLD CALC: 26.4 % — LOW (ref 39–50)
HCT VFR BLD CALC: 29.7 % — LOW (ref 39–50)
HGB BLD-MCNC: 8.7 G/DL — LOW (ref 13–17)
HGB BLD-MCNC: 9.6 G/DL — LOW (ref 13–17)
INR BLD: 1.14 RATIO — SIGNIFICANT CHANGE UP (ref 0.88–1.16)
KETONES UR-MCNC: NEGATIVE — SIGNIFICANT CHANGE UP
LEUKOCYTE ESTERASE UR-ACNC: NEGATIVE — SIGNIFICANT CHANGE UP
MAGNESIUM SERPL-MCNC: 1.9 MG/DL — SIGNIFICANT CHANGE UP (ref 1.6–2.6)
MAGNESIUM SERPL-MCNC: 1.9 MG/DL — SIGNIFICANT CHANGE UP (ref 1.6–2.6)
MCHC RBC-ENTMCNC: 30.3 PG — SIGNIFICANT CHANGE UP (ref 27–34)
MCHC RBC-ENTMCNC: 31 PG — SIGNIFICANT CHANGE UP (ref 27–34)
MCHC RBC-ENTMCNC: 32.3 GM/DL — SIGNIFICANT CHANGE UP (ref 32–36)
MCHC RBC-ENTMCNC: 33 GM/DL — SIGNIFICANT CHANGE UP (ref 32–36)
MCV RBC AUTO: 93.7 FL — SIGNIFICANT CHANGE UP (ref 80–100)
MCV RBC AUTO: 94 FL — SIGNIFICANT CHANGE UP (ref 80–100)
NITRITE UR-MCNC: NEGATIVE — SIGNIFICANT CHANGE UP
NRBC # BLD: 0 /100 WBCS — SIGNIFICANT CHANGE UP (ref 0–0)
NRBC # BLD: 0 /100 WBCS — SIGNIFICANT CHANGE UP (ref 0–0)
NT-PROBNP SERPL-SCNC: 3994 PG/ML — HIGH (ref 0–300)
PH UR: 6.5 — SIGNIFICANT CHANGE UP (ref 5–8)
PHOSPHATE SERPL-MCNC: 2.5 MG/DL — SIGNIFICANT CHANGE UP (ref 2.5–4.5)
PHOSPHATE SERPL-MCNC: 2.6 MG/DL — SIGNIFICANT CHANGE UP (ref 2.5–4.5)
PLATELET # BLD AUTO: 147 K/UL — LOW (ref 150–400)
PLATELET # BLD AUTO: 159 K/UL — SIGNIFICANT CHANGE UP (ref 150–400)
POTASSIUM SERPL-MCNC: 4.1 MMOL/L — SIGNIFICANT CHANGE UP (ref 3.5–5.3)
POTASSIUM SERPL-MCNC: 4.3 MMOL/L — SIGNIFICANT CHANGE UP (ref 3.5–5.3)
POTASSIUM SERPL-SCNC: 4.1 MMOL/L — SIGNIFICANT CHANGE UP (ref 3.5–5.3)
POTASSIUM SERPL-SCNC: 4.3 MMOL/L — SIGNIFICANT CHANGE UP (ref 3.5–5.3)
PROT UR-MCNC: SIGNIFICANT CHANGE UP
PROTHROM AB SERPL-ACNC: 13.2 SEC — SIGNIFICANT CHANGE UP (ref 10.5–13.4)
RBC # BLD: 2.81 M/UL — LOW (ref 4.2–5.8)
RBC # BLD: 3.17 M/UL — LOW (ref 4.2–5.8)
RBC # FLD: 13.1 % — SIGNIFICANT CHANGE UP (ref 10.3–14.5)
RBC # FLD: 13.2 % — SIGNIFICANT CHANGE UP (ref 10.3–14.5)
SODIUM SERPL-SCNC: 138 MMOL/L — SIGNIFICANT CHANGE UP (ref 135–145)
SODIUM SERPL-SCNC: 139 MMOL/L — SIGNIFICANT CHANGE UP (ref 135–145)
SP GR SPEC: 1.02 — SIGNIFICANT CHANGE UP (ref 1.01–1.02)
UROBILINOGEN FLD QL: ABNORMAL
WBC # BLD: 10.04 K/UL — SIGNIFICANT CHANGE UP (ref 3.8–10.5)
WBC # BLD: 8.46 K/UL — SIGNIFICANT CHANGE UP (ref 3.8–10.5)
WBC # FLD AUTO: 10.04 K/UL — SIGNIFICANT CHANGE UP (ref 3.8–10.5)
WBC # FLD AUTO: 8.46 K/UL — SIGNIFICANT CHANGE UP (ref 3.8–10.5)

## 2022-12-30 PROCEDURE — 99223 1ST HOSP IP/OBS HIGH 75: CPT

## 2022-12-30 PROCEDURE — 99222 1ST HOSP IP/OBS MODERATE 55: CPT | Mod: GC

## 2022-12-30 PROCEDURE — 71045 X-RAY EXAM CHEST 1 VIEW: CPT | Mod: 26

## 2022-12-30 PROCEDURE — 93306 TTE W/DOPPLER COMPLETE: CPT | Mod: 26

## 2022-12-30 RX ORDER — MAGNESIUM SULFATE 500 MG/ML
1 VIAL (ML) INJECTION ONCE
Refills: 0 | Status: COMPLETED | OUTPATIENT
Start: 2022-12-30 | End: 2022-12-30

## 2022-12-30 RX ORDER — DEXTROSE 50 % IN WATER 50 %
15 SYRINGE (ML) INTRAVENOUS ONCE
Refills: 0 | Status: DISCONTINUED | OUTPATIENT
Start: 2022-12-30 | End: 2022-12-30

## 2022-12-30 RX ORDER — SODIUM CHLORIDE 9 MG/ML
1000 INJECTION, SOLUTION INTRAVENOUS
Refills: 0 | Status: DISCONTINUED | OUTPATIENT
Start: 2022-12-30 | End: 2022-12-30

## 2022-12-30 RX ORDER — DEXTROSE 50 % IN WATER 50 %
25 SYRINGE (ML) INTRAVENOUS ONCE
Refills: 0 | Status: DISCONTINUED | OUTPATIENT
Start: 2022-12-30 | End: 2022-12-30

## 2022-12-30 RX ORDER — ACETAMINOPHEN 500 MG
650 TABLET ORAL EVERY 6 HOURS
Refills: 0 | Status: DISCONTINUED | OUTPATIENT
Start: 2022-12-30 | End: 2022-12-30

## 2022-12-30 RX ORDER — ACETAMINOPHEN 500 MG
1000 TABLET ORAL EVERY 6 HOURS
Refills: 0 | Status: COMPLETED | OUTPATIENT
Start: 2022-12-30 | End: 2022-12-31

## 2022-12-30 RX ORDER — BUDESONIDE, MICRONIZED 100 %
0.25 POWDER (GRAM) MISCELLANEOUS EVERY 12 HOURS
Refills: 0 | Status: DISCONTINUED | OUTPATIENT
Start: 2022-12-30 | End: 2022-12-31

## 2022-12-30 RX ORDER — HYDROMORPHONE HYDROCHLORIDE 2 MG/ML
0.5 INJECTION INTRAMUSCULAR; INTRAVENOUS; SUBCUTANEOUS EVERY 4 HOURS
Refills: 0 | Status: DISCONTINUED | OUTPATIENT
Start: 2022-12-30 | End: 2022-12-31

## 2022-12-30 RX ORDER — METOPROLOL TARTRATE 50 MG
5 TABLET ORAL EVERY 6 HOURS
Refills: 0 | Status: DISCONTINUED | OUTPATIENT
Start: 2022-12-30 | End: 2022-12-31

## 2022-12-30 RX ORDER — ENOXAPARIN SODIUM 100 MG/ML
40 INJECTION SUBCUTANEOUS EVERY 24 HOURS
Refills: 0 | Status: DISCONTINUED | OUTPATIENT
Start: 2022-12-30 | End: 2022-12-31

## 2022-12-30 RX ORDER — SODIUM,POTASSIUM PHOSPHATES 278-250MG
1 POWDER IN PACKET (EA) ORAL ONCE
Refills: 0 | Status: DISCONTINUED | OUTPATIENT
Start: 2022-12-30 | End: 2022-12-30

## 2022-12-30 RX ORDER — GLUCAGON INJECTION, SOLUTION 0.5 MG/.1ML
1 INJECTION, SOLUTION SUBCUTANEOUS ONCE
Refills: 0 | Status: DISCONTINUED | OUTPATIENT
Start: 2022-12-30 | End: 2022-12-30

## 2022-12-30 RX ORDER — DEXTROSE 50 % IN WATER 50 %
12.5 SYRINGE (ML) INTRAVENOUS ONCE
Refills: 0 | Status: DISCONTINUED | OUTPATIENT
Start: 2022-12-30 | End: 2022-12-30

## 2022-12-30 RX ORDER — CHLORHEXIDINE GLUCONATE 213 G/1000ML
1 SOLUTION TOPICAL DAILY
Refills: 0 | Status: DISCONTINUED | OUTPATIENT
Start: 2022-12-30 | End: 2022-12-31

## 2022-12-30 RX ORDER — FUROSEMIDE 40 MG
40 TABLET ORAL ONCE
Refills: 0 | Status: COMPLETED | OUTPATIENT
Start: 2022-12-30 | End: 2022-12-30

## 2022-12-30 RX ORDER — IPRATROPIUM/ALBUTEROL SULFATE 18-103MCG
3 AEROSOL WITH ADAPTER (GRAM) INHALATION EVERY 4 HOURS
Refills: 0 | Status: DISCONTINUED | OUTPATIENT
Start: 2022-12-30 | End: 2022-12-31

## 2022-12-30 RX ORDER — INSULIN LISPRO 100/ML
VIAL (ML) SUBCUTANEOUS
Refills: 0 | Status: DISCONTINUED | OUTPATIENT
Start: 2022-12-30 | End: 2022-12-30

## 2022-12-30 RX ORDER — INSULIN LISPRO 100/ML
VIAL (ML) SUBCUTANEOUS AT BEDTIME
Refills: 0 | Status: DISCONTINUED | OUTPATIENT
Start: 2022-12-30 | End: 2022-12-30

## 2022-12-30 RX ORDER — INSULIN LISPRO 100/ML
VIAL (ML) SUBCUTANEOUS EVERY 6 HOURS
Refills: 0 | Status: DISCONTINUED | OUTPATIENT
Start: 2022-12-30 | End: 2022-12-31

## 2022-12-30 RX ADMIN — Medication 0.25 MILLIGRAM(S): at 17:55

## 2022-12-30 RX ADMIN — CHLORHEXIDINE GLUCONATE 1 APPLICATION(S): 213 SOLUTION TOPICAL at 13:48

## 2022-12-30 RX ADMIN — Medication 400 MILLIGRAM(S): at 17:19

## 2022-12-30 RX ADMIN — Medication 1: at 12:12

## 2022-12-30 RX ADMIN — Medication 3 MILLILITER(S): at 17:49

## 2022-12-30 RX ADMIN — Medication 400 MILLIGRAM(S): at 11:37

## 2022-12-30 RX ADMIN — ENOXAPARIN SODIUM 40 MILLIGRAM(S): 100 INJECTION SUBCUTANEOUS at 12:07

## 2022-12-30 RX ADMIN — HYDROMORPHONE HYDROCHLORIDE 0.5 MILLIGRAM(S): 2 INJECTION INTRAMUSCULAR; INTRAVENOUS; SUBCUTANEOUS at 18:45

## 2022-12-30 RX ADMIN — Medication 0.25 MILLIGRAM(S): at 11:38

## 2022-12-30 RX ADMIN — HYDROMORPHONE HYDROCHLORIDE 0.5 MILLIGRAM(S): 2 INJECTION INTRAMUSCULAR; INTRAVENOUS; SUBCUTANEOUS at 12:25

## 2022-12-30 RX ADMIN — Medication 5 MILLIGRAM(S): at 19:41

## 2022-12-30 RX ADMIN — Medication 3 MILLILITER(S): at 11:30

## 2022-12-30 RX ADMIN — HYDROMORPHONE HYDROCHLORIDE 0.5 MILLIGRAM(S): 2 INJECTION INTRAMUSCULAR; INTRAVENOUS; SUBCUTANEOUS at 18:28

## 2022-12-30 RX ADMIN — Medication 3 MILLILITER(S): at 21:59

## 2022-12-30 RX ADMIN — Medication 5 MILLIGRAM(S): at 11:36

## 2022-12-30 RX ADMIN — Medication 4: at 17:21

## 2022-12-30 RX ADMIN — Medication 1000 MILLIGRAM(S): at 12:07

## 2022-12-30 RX ADMIN — Medication 40 MILLIGRAM(S): at 11:29

## 2022-12-30 RX ADMIN — HYDROMORPHONE HYDROCHLORIDE 0.5 MILLIGRAM(S): 2 INJECTION INTRAMUSCULAR; INTRAVENOUS; SUBCUTANEOUS at 12:08

## 2022-12-30 RX ADMIN — Medication 100 GRAM(S): at 13:48

## 2022-12-30 RX ADMIN — Medication 63.75 MILLIMOLE(S): at 14:43

## 2022-12-30 NOTE — DIETITIAN INITIAL EVALUATION ADULT - PERTINENT MEDS FT
MEDICATIONS  (STANDING):  acetaminophen   IVPB .. 1000 milliGRAM(s) IV Intermittent every 6 hours  albuterol/ipratropium for Nebulization 3 milliLiter(s) Nebulizer every 4 hours  buDESOnide    Inhalation Suspension 0.25 milliGRAM(s) Inhalation every 12 hours  chlorhexidine 2% Cloths 1 Application(s) Topical daily  enoxaparin Injectable 40 milliGRAM(s) SubCutaneous every 24 hours  glucagon  Injectable 1 milliGRAM(s) IntraMuscular once  insulin lispro (ADMELOG) corrective regimen sliding scale   SubCutaneous every 6 hours  metoprolol tartrate Injectable 5 milliGRAM(s) IV Push every 6 hours  sodium phosphate 15 milliMole(s)/250 mL IVPB 15 milliMole(s) IV Intermittent once    MEDICATIONS  (PRN):  HYDROmorphone  Injectable 0.5 milliGRAM(s) IV Push every 4 hours PRN Severe Pain (7 - 10)

## 2022-12-30 NOTE — PROGRESS NOTE ADULT - SUBJECTIVE AND OBJECTIVE BOX
Patient is a 87y old  Male who presents with a chief complaint of Transfer level 2 trauma (30 Dec 2022 11:27)      SUBJECTIVE / OVERNIGHT EVENTS:  Patient seen and examined.   Family at bedside.   Patient being transferred to the SICU.   Has some shortness of breath and wheezing.      Vital Signs Last 24 Hrs  T(C): 36.8 (30 Dec 2022 11:10), Max: 37.2 (30 Dec 2022 08:35)  T(F): 98.2 (30 Dec 2022 11:10), Max: 99 (30 Dec 2022 08:35)  HR: 121 (30 Dec 2022 12:06) (15 - 121)  BP: 148/76 (30 Dec 2022 12:00) (119/64 - 151/75)  BP(mean): 103 (30 Dec 2022 12:00) (103 - 108)  RR: 30 (30 Dec 2022 12:00) (18 - 30)  SpO2: 100% (30 Dec 2022 12:06) (94% - 100%)    Parameters below as of 30 Dec 2022 12:06  Patient On (Oxygen Delivery Method): mask, aerosol      I&O's Summary    29 Dec 2022 07:01  -  30 Dec 2022 07:00  --------------------------------------------------------  IN: 600 mL / OUT: 2000 mL / NET: -1400 mL    30 Dec 2022 07:01  -  30 Dec 2022 12:11  --------------------------------------------------------  IN: 220 mL / OUT: 350 mL / NET: -130 mL        PHYSICAL EXAM:  GENERAL: NAD, AAOx1  HEAD:  Atraumatic, Normocephalic  EYES: EOMI; conjunctiva and sclera clear  NECK: Supple, No JVD, No LAD  CHEST/LUNG: wheezing bilaterally   HEART: Regular rate and rhythm; No murmurs, rubs, or gallops  ABDOMEN: Soft, Nontender, Nondistended; Bowel sounds present  EXTREMITIES:  2+ Peripheral Pulses, No clubbing, cyanosis, or edema  SKIN: No rashes or lesions    LABS:                        9.6    10.04 )-----------( 159      ( 30 Dec 2022 12:02 )             29.7     12-30    139  |  104  |  22  ----------------------------<  140<H>  4.1   |  24  |  0.90    Ca    9.1      30 Dec 2022 05:36  Phos  2.5     12-30  Mg     1.9     12-30    TPro  6.5  /  Alb  3.5  /  TBili  0.6  /  DBili  x   /  AST  19  /  ALT  17  /  AlkPhos  69  12-28    PT/INR - ( 30 Dec 2022 05:36 )   PT: 13.2 sec;   INR: 1.14 ratio         PTT - ( 30 Dec 2022 05:36 )  PTT:20.2 sec  CAPILLARY BLOOD GLUCOSE      POCT Blood Glucose.: 173 mg/dL (30 Dec 2022 11:50)  POCT Blood Glucose.: 136 mg/dL (30 Dec 2022 08:45)  POCT Blood Glucose.: 151 mg/dL (29 Dec 2022 21:14)  POCT Blood Glucose.: 163 mg/dL (29 Dec 2022 17:48)  POCT Blood Glucose.: 162 mg/dL (29 Dec 2022 17:33)  POCT Blood Glucose.: 183 mg/dL (29 Dec 2022 12:56)            RADIOLOGY & ADDITIONAL TESTS:    Imaging Personally Reviewed:  [x] YES  [ ] NO    Consultant(s) Notes Reviewed:  [x] YES  [ ] NO      MEDICATIONS  (STANDING):  acetaminophen   IVPB .. 1000 milliGRAM(s) IV Intermittent every 6 hours  albuterol/ipratropium for Nebulization 3 milliLiter(s) Nebulizer every 4 hours  buDESOnide    Inhalation Suspension 0.25 milliGRAM(s) Inhalation every 12 hours  dextrose 5%. 1000 milliLiter(s) (100 mL/Hr) IV Continuous <Continuous>  dextrose 5%. 1000 milliLiter(s) (50 mL/Hr) IV Continuous <Continuous>  dextrose 50% Injectable 25 Gram(s) IV Push once  dextrose 50% Injectable 12.5 Gram(s) IV Push once  dextrose 50% Injectable 25 Gram(s) IV Push once  enoxaparin Injectable 40 milliGRAM(s) SubCutaneous every 24 hours  glucagon  Injectable 1 milliGRAM(s) IntraMuscular once  insulin lispro (ADMELOG) corrective regimen sliding scale   SubCutaneous three times a day before meals  insulin lispro (ADMELOG) corrective regimen sliding scale   SubCutaneous at bedtime  metoprolol tartrate Injectable 5 milliGRAM(s) IV Push every 6 hours  potassium phosphate / sodium phosphate Powder (PHOS-NaK) 1 Packet(s) Oral once    MEDICATIONS  (PRN):  dextrose Oral Gel 15 Gram(s) Oral once PRN Blood Glucose LESS THAN 70 milliGRAM(s)/deciliter  HYDROmorphone  Injectable 0.5 milliGRAM(s) IV Push every 4 hours PRN Severe Pain (7 - 10)      Care Discussed with Consultants/Other Providers [x] YES  [ ] NO    HEALTH ISSUES - PROBLEM Dx:  Compression fracture of L2    Hematoma    Dementia    Hypertension    Chronic atrial fibrillation    Diabetes mellitus    Hyperlipemia    Medication management    Preoperative clearance

## 2022-12-30 NOTE — CONSULT NOTE ADULT - ASSESSMENT
Dr. Leandro Lopez, DO  Pulmonary and Critical Care Medicine Fellow   Available via Microsoft Teams - **Preferred**  Pulmonary Spectra #[id number] ([facility]) / [id number] ([facility])  Pager:  [phone] 88yo Romansh speaking male w/ PMH of Afib on Xarelto & Alzheimer's presenting as transfer to [facility] from [facility] after mechanical fall. Noted to have labored breathing upon visitation to OR. Pulm c/s for pre-opt eval.     #PULMONARY PRE-OPT EVAL  -No known Pulm PMH, no hx of smoking, or environmental exposure  -CXR reviewed  -Sating well on RA  -s/p duonebs, lasix  -pt euvolemic, clinically stable, no wheeze noted  > Pt optimized from pulm standpoint  > no pulmonary contraindications to surgery            86yo Turkmen speaking male w/ PMH of Afib on Xarelto & Alzheimer's presenting as transfer to [facility] from [facility] after mechanical fall. Noted to have labored breathing upon visitation to OR. Pulm c/s for pre-op eval.     #PULMONARY PRE-OPT EVAL  -No known Pulm PMH, no hx of smoking, or environmental exposure  -CXR reviewed  -SpO2 well on RA  -s/p duonebs, lasix  -pt euvolemic, clinically stable, no wheeze noted  > Pt optimized from pulm standpoint  > no pulmonary contraindications to surgery

## 2022-12-30 NOTE — DIETITIAN INITIAL EVALUATION ADULT - PERTINENT LABORATORY DATA
12-30    138  |  104  |  24<H>  ----------------------------<  187<H>  4.3   |  24  |  0.89    Ca    9.1      30 Dec 2022 12:02  Phos  2.6     12-30  Mg     1.9     12-30    TPro  6.5  /  Alb  3.5  /  TBili  0.6  /  DBili  x   /  AST  19  /  ALT  17  /  AlkPhos  69  12-28  POCT Blood Glucose.: 173 mg/dL (12-30-22 @ 11:50)  A1C with Estimated Average Glucose Result: 6.8 % (12-29-22 @ 02:15)

## 2022-12-30 NOTE — DIETITIAN INITIAL EVALUATION ADULT - ADD RECOMMEND
Defer nutrition provision/diet advancement to medical team. Consider formal swallowing evaluation with speech pathologist prior to diet advancement in setting of reported swallowing difficulty. Add multivitamin if no medical contraindications. Monitor nutrition provision and tolerance, weight, labs, skin, GI status, diet.

## 2022-12-30 NOTE — DIETITIAN INITIAL EVALUATION ADULT - REASON INDICATOR FOR ASSESSMENT
Consult (Registered Dietitian)  Source: EMR, patient's family at bedside (patient noted to be confused, disoriented, moaning at time of visit, unable to participate in RD interview)  Interview with family limited at this time as tending to patient and with limited interest in speaking with RD

## 2022-12-30 NOTE — CHART NOTE - NSCHARTNOTEFT_GEN_A_CORE
The patient went down to OR and was audibly wheezing and having somewhat laboured breathing. Given that this was an acute change and no pulmonary evaluation had been undertaken anesthesia aborted the case pending pulmonary evaluation/clearance

## 2022-12-30 NOTE — CONSULT NOTE ADULT - SUBJECTIVE AND OBJECTIVE BOX
SICU CONSULT NOTE     HPI:  86yo M h/o dementia, Afib on Xarelto, presenting as transfer from [facility] after mechanical fall. Pt had a ground level fall yesterday, and was transferred here for higher level of care.  Got Kcentra at [facility].  Found to have L2 spine fracture, RP hematoma.  Planned for OR but due to wheezing, SOB, worsening resp status, OR was deferred and SICU consulted for management of respiratory distress.     ROS:  unable to obtain due to pt mental status     (28 Dec 2022 20:11)      PAST MEDICAL HISTORY: HTN (hypertension)    Prostate cancer    Hx of radiation therapy    Cataract of left eye    Borderline diabetes    Kidney cyst, acquired    Atrial fibrillation    Cervical disc displacement    Obesity (BMI 30-39.9)        PAST SURGICAL HISTORY: Cataract extraction status of left eye    Larynx polyp        FAMILY HISTORY: No pertinent family history in first degree relatives    FH: dementia (Mother)        SOCIAL HISTORY:    CODE STATUS:     HOME MEDICATIONS:    ALLERGIES: aspirin (Rash)      VITAL SIGNS:  ICU Vital Signs Last 24 Hrs  T(C): 36.8 (30 Dec 2022 11:10), Max: 37.2 (30 Dec 2022 08:35)  T(F): 98.2 (30 Dec 2022 11:10), Max: 99 (30 Dec 2022 08:35)  HR: 117 (30 Dec 2022 11:10) (84 - 117)  BP: 144/83 (30 Dec 2022 11:10) (119/64 - 151/75)  BP(mean): 108 (30 Dec 2022 11:10) (108 - 108)  ABP: --  ABP(mean): --  RR: 30 (30 Dec 2022 11:10) (18 - 30)  SpO2: 97% (30 Dec 2022 11:10) (94% - 97%)    O2 Parameters below as of 30 Dec 2022 08:35  Patient On (Oxygen Delivery Method): room air      PHYSICAL EXAM:  Gen: Uncomfortable, in bed  Neuro: A&Ox1   Resp: increased WOB, audible wheezing   Cardiac: appears well perfused, extremities warm  Abd: soft, nondistended, nontender   : Drake draining thick urine     NEURO  Meds:acetaminophen   IVPB .. 1000 milliGRAM(s) IV Intermittent every 6 hours      RESPIRATORY  Mechanical Ventilation:   ABG - ( 29 Dec 2022 15:43 )  pH: x     /  pCO2: x     /  pO2: x     / HCO3: x     / Base Excess: x     /  SaO2: x       Lactate: 2.0        Meds:albuterol/ipratropium for Nebulization 3 milliLiter(s) Nebulizer every 4 hours  buDESOnide    Inhalation Suspension 0.25 milliGRAM(s) Inhalation every 12 hours      CARDIOVASCULAR  VBG - ( 28 Dec 2022 19:48 )  pH: x     /  pCO2: x     /  pO2: x     / HCO3: x     / Base Excess: x     /  SaO2: x      Lactate: 2.6        Cardiac Rhythm: afib tachy  Meds:metoprolol tartrate Injectable 5 milliGRAM(s) IV Push every 6 hours      GI/NUTRITION  Diet: npo   Meds:    GENITOURINARY/RENAL  Meds:dextrose 5%. 1000 milliLiter(s) IV Continuous <Continuous>  dextrose 5%. 1000 milliLiter(s) IV Continuous <Continuous>  potassium phosphate / sodium phosphate Powder (PHOS-NaK) 1 Packet(s) Oral once      12-29 @ 07:01  -  12-30 @ 07:00  --------------------------------------------------------  IN:    Oral Fluid: 600 mL  Total IN: 600 mL    OUT:    Voided (mL): 2000 mL  Total OUT: 2000 mL    Total NET: -1400 mL      12-30 @ 07:01  -  12-30 @ 11:31  --------------------------------------------------------  IN:    Oral Fluid: 220 mL  Total IN: 220 mL    OUT:    Voided (mL): 350 mL  Total OUT: 350 mL    Total NET: -130 mL    12-30    139  |  104  |  22  ----------------------------<  140<H>  4.1   |  24  |  0.90    Ca    9.1      30 Dec 2022 05:36  Phos  2.5     12-30  Mg     1.9     12-30    TPro  6.5  /  Alb  3.5  /  TBili  0.6  /  DBili  x   /  AST  19  /  ALT  17  /  AlkPhos  69  12-28    [ ] Drake catheter, indication: urine output monitoring in critically ill patient    HEMATOLOGIC  [ ] VTE Prophylaxis:  enoxaparin Injectable 40 milliGRAM(s) SubCutaneous every 24 hours                          8.7    8.46  )-----------( 147      ( 30 Dec 2022 05:36 )             26.4     PT/INR - ( 30 Dec 2022 05:36 )   PT: 13.2 sec;   INR: 1.14 ratio         PTT - ( 30 Dec 2022 05:36 )  PTT:20.2 sec  Transfusion: [ ] PRBC	[ ] Platelets	[ ] FFP	[ ] Cryoprecipitate      INFECTIOUS DISEASES  Meds:  RECENT CULTURES:      ENDOCRINE  Meds:dextrose 50% Injectable 25 Gram(s) IV Push once  dextrose 50% Injectable 12.5 Gram(s) IV Push once  dextrose 50% Injectable 25 Gram(s) IV Push once  dextrose Oral Gel 15 Gram(s) Oral once PRN  glucagon  Injectable 1 milliGRAM(s) IntraMuscular once  insulin lispro (ADMELOG) corrective regimen sliding scale   SubCutaneous three times a day before meals  insulin lispro (ADMELOG) corrective regimen sliding scale   SubCutaneous at bedtime    CAPILLARY BLOOD GLUCOSE      POCT Blood Glucose.: 136 mg/dL (30 Dec 2022 08:45)  POCT Blood Glucose.: 151 mg/dL (29 Dec 2022 21:14)  POCT Blood Glucose.: 163 mg/dL (29 Dec 2022 17:48)  POCT Blood Glucose.: 162 mg/dL (29 Dec 2022 17:33)  POCT Blood Glucose.: 183 mg/dL (29 Dec 2022 12:56)  POCT Blood Glucose.: 180 mg/dL (29 Dec 2022 12:01)      PATIENT CARE ACCESS DEVICES:  [x] Peripheral IV  [ ] Central Venous Line	[ ] R	[ ] L	[ ] IJ	[ ] Fem	[ ] SC	Placed:   [ ] Arterial Line		[ ] R	[ ] L	[ ] Fem	[ ] Rad	[ ] Ax	Placed:   [ ] PICC:					[ ] Mediport  [x] Urinary Catheter, Date Placed: 12/30  [x] Necessity of urinary, arterial, and venous catheters discussed    OTHER MEDICATIONS:     IMAGING STUDIES:  PRIOR STUDIES: CT scan dated 12/28/2022    FINDINGS: Patient motion markedly degrades image quality.    LOCALIZER: No additional findings.  BONES: Images again demonstrate the presence of an oblique fracture   through the inferior body of L2 extending through the anterior cortex and   inferior endplate with mild distraction. Associated edema is noted.  ALIGNMENT: The alignment is normal.  SACROILIAC JOINTS/SACRUM: There is no sacral fracture. TheSI joints are   partially visualized but are intact.  CONUS AND CAUDA EQUINA: The distal cord and conus are normal in signal.   Conus terminates at T12.  VISUALIZED INTRAPELVIC/INTRA-ABDOMINAL SOFT TISSUES: Unable to visualize   due to patient motion.  PARASPINAL SOFT TISSUES: Enlargement of the LEFT psoas muscle consistent   with known intramuscular hematoma.      INDIVIDUAL LEVELS:    LOWER THORACIC SPINE: No spinal canal or neuroforaminal stenosis.    Multilevel degenerative disc disease and spondylosis with loss of disc   height and associated degenerative endplate changes. Mild to moderate   central stenosis at these levels due to disc bulge, facet osteophytic   hypertrophy and redundancy of ligamentum flavum.      IMPRESSION:    Markedly limited examination due to patient motion. Oblique fracture   through the inferior body of L2 extending through the anterior cortex and   inferior endplate with mild distraction. Associated edema is   noted.Enlargement of the LEFT psoas muscle consistent with known   intramuscular hematoma. Degenerative disc disease and spondylosis as   described.

## 2022-12-30 NOTE — DIETITIAN INITIAL EVALUATION ADULT - ENTER TO (G/KG)
Dr. Worthington's schedule is full until 4/27.  Offered to check with Dr. Worthington if pt could be seen on 4/19-her call day.  Pt declines.  Pt is wondering if it is okay that she waits that long to be seen?  Pt is doing well/pregnancy is going well.  Reinforced that she is fine with waiting to be seen on 4/27.  Discussed that it is not uncommon at times d/t coordinating schedules with pt and provider.  Offered pt early ricardo with another provider-pt declines.  Also discussed that if pt changes her mind and would like to be seen sooner she should call and we can help her.    Pt having problems with heartburn and wondering if Tums is okay to take with pregnancy?  Discussed that Tums are safe to take with pregnancy.  Pt can also review handout sent on 11/24/21 on what medications she can take safely with pregnancy.    Pt verbalizes understanding on the above.     1.4

## 2022-12-30 NOTE — CONSULT NOTE ADULT - ATTENDING COMMENTS
88yo M h/o dementia, Afib on Xarelto, presenting as transfer from [facility] after mechanical fall and found to have  L2 fx and three column injury. Went to OR for fixation but case cancelled sec to audible wheeze.  CXR reviewed, has mild pul vascular congestion with pBNP 3994.   Pt denies smoking history    One dose IV Lasix, will monitor I/O CXR BNP  Start empiric Duoneb abd Pulmicort  Stable pulmonary critical care wise for neuro spine procedure  Neuro surgery contacted
87 year old male here with mechanical fall with L2 fracture. Planned for surgery today but cancelled for wheezing. Patient received Lasix and bronchodilators with improvement. At time of exam currently patient is not wheezing, appears comfortable, and is saturating well on room air while laying in supine position. Wheezing may have been secondary to mild volume overload.     Recommend to maintain euvolemia. Can continue bronchodilators as needed.  Patient has no prior history of pulmonary disease  He is a never-smoker as per his son at bedside    Patient is currently optimized from pulmonary standpoint  No pulmonary contraindications to move forward with surgery

## 2022-12-30 NOTE — DIETITIAN INITIAL EVALUATION ADULT - NSFNSNUTRCHEWSWALLOWFT_GEN_A_CORE
Per family, patient has recently been having difficulties swallowing, mostly with liquids. Pt is "unable to use a straw or he will choke." Report patient typically consumes regular-texture foods with no overt difficulties chewing.

## 2022-12-30 NOTE — PROGRESS NOTE ADULT - SUBJECTIVE AND OBJECTIVE BOX
Trauma Surgery Progress Note  Patient is a 87y old  Male who presents with a chief complaint of Transfer level 2 trauma (30 Dec 2022 10:32)      INTERVAL EVENTS: No acute events overnight.  SUBJECTIVE: Patient seen and examined at bedside with surgical team. Pt minimally verbal at baseline. Pt was scheduled for surgery with     OBJECTIVE:    Vital Signs Last 24 Hrs  T(C): 36.8 (30 Dec 2022 11:10), Max: 37.2 (30 Dec 2022 08:35)  T(F): 98.2 (30 Dec 2022 11:10), Max: 99 (30 Dec 2022 08:35)  HR: 117 (30 Dec 2022 11:10) (84 - 117)  BP: 144/83 (30 Dec 2022 11:10) (119/64 - 151/75)  BP(mean): 108 (30 Dec 2022 11:10) (108 - 108)  RR: 30 (30 Dec 2022 11:10) (18 - 30)  SpO2: 97% (30 Dec 2022 11:10) (94% - 97%)    Parameters below as of 30 Dec 2022 08:35  Patient On (Oxygen Delivery Method): room air    I&O's Detail    29 Dec 2022 07:01  -  30 Dec 2022 07:00  --------------------------------------------------------  IN:    Oral Fluid: 600 mL  Total IN: 600 mL    OUT:    Voided (mL): 2000 mL  Total OUT: 2000 mL    Total NET: -1400 mL      30 Dec 2022 07:01  -  30 Dec 2022 11:14  --------------------------------------------------------  IN:    Oral Fluid: 220 mL  Total IN: 220 mL    OUT:    Voided (mL): 350 mL  Total OUT: 350 mL    Total NET: -130 mL      MEDICATIONS  (STANDING):  dextrose 5%. 1000 milliLiter(s) (50 mL/Hr) IV Continuous <Continuous>  dextrose 5%. 1000 milliLiter(s) (100 mL/Hr) IV Continuous <Continuous>  dextrose 50% Injectable 25 Gram(s) IV Push once  dextrose 50% Injectable 12.5 Gram(s) IV Push once  dextrose 50% Injectable 25 Gram(s) IV Push once  enoxaparin Injectable 40 milliGRAM(s) SubCutaneous every 24 hours  glucagon  Injectable 1 milliGRAM(s) IntraMuscular once  insulin lispro (ADMELOG) corrective regimen sliding scale   SubCutaneous three times a day before meals  insulin lispro (ADMELOG) corrective regimen sliding scale   SubCutaneous at bedtime  potassium phosphate / sodium phosphate Powder (PHOS-NaK) 1 Packet(s) Oral once    MEDICATIONS  (PRN):  acetaminophen     Tablet .. 650 milliGRAM(s) Oral every 6 hours PRN Mild Pain (1 - 3)  dextrose Oral Gel 15 Gram(s) Oral once PRN Blood Glucose LESS THAN 70 milliGRAM(s)/deciliter      PHYSICAL EXAM:  Constitutional: A&Ox3, NAD  Respiratory: Unlabored breathing  Abdomen: Soft, nondistended, NTTP. No rebound or guarding.  Extremities: WWP, CROSS spontaneously    LABS:                        8.7    8.46  )-----------( 147      ( 30 Dec 2022 05:36 )             26.4     12-30    139  |  104  |  22  ----------------------------<  140<H>  4.1   |  24  |  0.90    Ca    9.1      30 Dec 2022 05:36  Phos  2.5     12-30  Mg     1.9     12-30    TPro  6.5  /  Alb  3.5  /  TBili  0.6  /  DBili  x   /  AST  19  /  ALT  17  /  AlkPhos  69  12-28    PT/INR - ( 30 Dec 2022 05:36 )   PT: 13.2 sec;   INR: 1.14 ratio         PTT - ( 30 Dec 2022 05:36 )  PTT:20.2 sec  LIVER FUNCTIONS - ( 28 Dec 2022 19:48 )  Alb: 3.5 g/dL / Pro: 6.5 g/dL / ALK PHOS: 69 U/L / ALT: 17 U/L / AST: 19 U/L / GGT: x                 IMAGING:     Trauma Surgery Progress Note  Patient is a 87y old  Male who presents with a chief complaint of Transfer level 2 trauma (30 Dec 2022 10:32)      INTERVAL EVENTS: No acute events overnight.  SUBJECTIVE: Patient seen and examined at bedside with surgical team. Pt minimally verbal at baseline. Pt was scheduled for surgery with nsgy this morning, but was cancelled by anesthesia 2/2 concerns about his respiratory status. Pt with intermittent coughing and concerns of aspiration risk, so HOB elevated slightly.    OBJECTIVE:    Vital Signs Last 24 Hrs  T(C): 36.8 (30 Dec 2022 11:10), Max: 37.2 (30 Dec 2022 08:35)  T(F): 98.2 (30 Dec 2022 11:10), Max: 99 (30 Dec 2022 08:35)  HR: 117 (30 Dec 2022 11:10) (84 - 117)  BP: 144/83 (30 Dec 2022 11:10) (119/64 - 151/75)  BP(mean): 108 (30 Dec 2022 11:10) (108 - 108)  RR: 30 (30 Dec 2022 11:10) (18 - 30)  SpO2: 97% (30 Dec 2022 11:10) (94% - 97%)    Parameters below as of 30 Dec 2022 08:35  Patient On (Oxygen Delivery Method): room air    I&O's Detail    29 Dec 2022 07:01  -  30 Dec 2022 07:00  --------------------------------------------------------  IN:    Oral Fluid: 600 mL  Total IN: 600 mL    OUT:    Voided (mL): 2000 mL  Total OUT: 2000 mL    Total NET: -1400 mL      30 Dec 2022 07:01  -  30 Dec 2022 11:14  --------------------------------------------------------  IN:    Oral Fluid: 220 mL  Total IN: 220 mL    OUT:    Voided (mL): 350 mL  Total OUT: 350 mL    Total NET: -130 mL      MEDICATIONS  (STANDING):  dextrose 5%. 1000 milliLiter(s) (50 mL/Hr) IV Continuous <Continuous>  dextrose 5%. 1000 milliLiter(s) (100 mL/Hr) IV Continuous <Continuous>  dextrose 50% Injectable 25 Gram(s) IV Push once  dextrose 50% Injectable 12.5 Gram(s) IV Push once  dextrose 50% Injectable 25 Gram(s) IV Push once  enoxaparin Injectable 40 milliGRAM(s) SubCutaneous every 24 hours  glucagon  Injectable 1 milliGRAM(s) IntraMuscular once  insulin lispro (ADMELOG) corrective regimen sliding scale   SubCutaneous three times a day before meals  insulin lispro (ADMELOG) corrective regimen sliding scale   SubCutaneous at bedtime  potassium phosphate / sodium phosphate Powder (PHOS-NaK) 1 Packet(s) Oral once    MEDICATIONS  (PRN):  acetaminophen     Tablet .. 650 milliGRAM(s) Oral every 6 hours PRN Mild Pain (1 - 3)  dextrose Oral Gel 15 Gram(s) Oral once PRN Blood Glucose LESS THAN 70 milliGRAM(s)/deciliter      PHYSICAL EXAM:  GEN: appears uncomfortable, pt slumped  NECK: bent in a slumped position  CHEST: equal chest rise  PULM: intermittent coughing, audible wheezing      LABS:                        8.7    8.46  )-----------( 147      ( 30 Dec 2022 05:36 )             26.4     12-30    139  |  104  |  22  ----------------------------<  140<H>  4.1   |  24  |  0.90    Ca    9.1      30 Dec 2022 05:36  Phos  2.5     12-30  Mg     1.9     12-30    TPro  6.5  /  Alb  3.5  /  TBili  0.6  /  DBili  x   /  AST  19  /  ALT  17  /  AlkPhos  69  12-28    PT/INR - ( 30 Dec 2022 05:36 )   PT: 13.2 sec;   INR: 1.14 ratio         PTT - ( 30 Dec 2022 05:36 )  PTT:20.2 sec  LIVER FUNCTIONS - ( 28 Dec 2022 19:48 )  Alb: 3.5 g/dL / Pro: 6.5 g/dL / ALK PHOS: 69 U/L / ALT: 17 U/L / AST: 19 U/L / GGT: x                 IMAGING:

## 2022-12-30 NOTE — DIETITIAN INITIAL EVALUATION ADULT - ORAL INTAKE PTA/DIET HISTORY
Per family, patient with "great" appetite and PO intake PTA. Confirmed NKFA. Pt noted with hx of DM, taking metformin PTA. HbA1c 6.8% suggests good glycemic control.

## 2022-12-30 NOTE — CONSULT NOTE ADULT - SUBJECTIVE AND OBJECTIVE BOX
PULMONARY SERVICE INITIAL CONSULT NOTE    HPI:  TRAUMA SURGERY CONSULT NOTE  --------------------------------------------------------------------------------------------    TRAUMA ACTIVATION LEVEL:     MECHANISM OF INJURY:      [X] Blunt  	[] MVC	[X] Fall	[] Pedestrian Struck	[] Motorcycle accident      [] Penetrating  	[] Gun Shot Wound 		[] Stab Wound    GCS: 	E: 4	V: 4	M: 6    Patient is a 87y old  Male who presents with a chief complaint of mechanical fall    HPI:   88yo M h/o dementia, Afib on Xarelto, presenting as transfer from [facility] after mechanical fall. Pt had a ground level fall yesterday, and was transferred here for higher level of care.  Madhav marquis at [facility]    Primary Survey:  A - airway intact  B - bilateral breath sounds and good chest rise  C - initial BP  BP: 134/62 (12-28-22 @ 18:32) *** , HR HR: 98 (12-28-22 @ 18:32) *** , Afib palpable pulses in all extremities  D - GCS 14 on arrival (confused)  Exposure obtained      Secondary Survey:  GEN: resting comfortably in bed, in NAD  HEENT: normocephalic, non-tender to palpation, no abrasions visible, no step-offs palpated  NECK: trachea midline, non-tender to palpation at posterior midline  CHEST: non-tender to palpation across clavicles and b/l anterior ribs  BACK: non-tender to palpation along cervical, thoracic, lumbar spine midline and b/l posterior ribs; no palpable step-offs or hematomas  ABD: soft, non-distended, non-tender   PELVIS: no pelvic instability  LUE: no visible abrasions or deformities, non-tender to palpation across upper and lower arm  RUE: no visible abrasions or deformities, non-tender to palpation across upper and lower arm  LLE: no visible abrasions or deformities, non-tender to palpation across upper and lower leg. Palpable PT  RLE: no visible abrasions or deformities, non-tender to palpation across upper and lower leg. Palpable PT  NEURO: AAOx0, CN II-IX grossly intact; pupils 3mm, equal and reactive to light bilaterally    ROS:  unable to obtain due to pt mental status     (28 Dec 2022 20:11)      Patient seen and examined at bedside.     REVIEW OF SYSTEMS:  All additional ROS negative.    PAST MEDICAL & SURGICAL HISTORY:  HTN (hypertension)      Prostate cancer  2011      Hx of radiation therapy  2011      Cataract of left eye      Borderline diabetes      Kidney cyst, acquired  left      Atrial fibrillation      Cervical disc displacement      Obesity (BMI 30-39.9)      Cataract extraction status of left eye      Larynx polyp  Excision of larynx polyp; 2009          FAMILY HISTORY:  FH: dementia (Mother)        SOCIAL HISTORY:  Smoking Status: [ ] Current, [ ] Former, [ ] Never  Pack Years:    MEDICATIONS:  Pulmonary:    Antimicrobials:    Anticoagulants:  enoxaparin Injectable 40 milliGRAM(s) SubCutaneous every 24 hours    Onc:    GI/:    Endocrine:  dextrose 50% Injectable 25 Gram(s) IV Push once  dextrose 50% Injectable 12.5 Gram(s) IV Push once  dextrose 50% Injectable 25 Gram(s) IV Push once  dextrose Oral Gel 15 Gram(s) Oral once PRN  glucagon  Injectable 1 milliGRAM(s) IntraMuscular once  insulin lispro (ADMELOG) corrective regimen sliding scale   SubCutaneous three times a day before meals  insulin lispro (ADMELOG) corrective regimen sliding scale   SubCutaneous at bedtime    Cardiac:    Other Medications:  acetaminophen     Tablet .. 650 milliGRAM(s) Oral every 6 hours PRN  dextrose 5%. 1000 milliLiter(s) IV Continuous <Continuous>  dextrose 5%. 1000 milliLiter(s) IV Continuous <Continuous>      Allergies    aspirin (Rash)    Intolerances        PHYSICAL EXAM  Vital Signs Last 24 Hrs  T(C): 37.2 (30 Dec 2022 08:35), Max: 37.2 (30 Dec 2022 08:35)  T(F): 99 (30 Dec 2022 08:35), Max: 99 (30 Dec 2022 08:35)  HR: 102 (30 Dec 2022 08:35) (84 - 114)  BP: 119/64 (30 Dec 2022 08:35) (119/64 - 151/75)  BP(mean): --  RR: 18 (30 Dec 2022 08:35) (18 - 18)  SpO2: 95% (30 Dec 2022 08:35) (94% - 97%)    Parameters below as of 30 Dec 2022 08:35  Patient On (Oxygen Delivery Method): room air        12-29 est @ 07:01 - 12-30 @ 07:00  --------------------------------------------------------  IN: 600 mL / OUT: 2000 mL / NET: -1400 mL    12-30 @ 07:01 - 12-30 @ 10:33  --------------------------------------------------------  IN: 220 mL / OUT: 0 mL / NET: 220 mL          CONSTITUTIONAL: No acute distress.   HEENT:  Conjunctiva clear B/L.  Moist oral mucosa.   Cardiovascular: RRR with no murmurs. No JVD noted. No lower extremity edema B/L. Extremities are warm and well perfused.    Respiratory: Lungs CTAB. No wrr. No accessory muscle use.   Gastrointestinal:  Soft, nontender. Non-distended. Non-rigid.    Neurologic:  Alert and awake. Moving all extremities. Following commands.    Skin:  No gross rashes notes.      LABS:      CBC Full  -  ( 30 Dec 2022 05:36 )  WBC Count : 8.46 K/uL  RBC Count : 2.81 M/uL  Hemoglobin : 8.7 g/dL  Hematocrit : 26.4 %  Platelet Count - Automated : 147 K/uL  Mean Cell Volume : 94.0 fl  Mean Cell Hemoglobin : 31.0 pg  Mean Cell Hemoglobin Concentration : 33.0 gm/dL  Auto Neutrophil # : x  Auto hLhymphocyte # : x  Auto Monocyte # : x  Auto Eosinophil # : x  Auto Basophil # : x  Auto Neutrophil % : x  Auto Lymphocyte % : x  Auto Monocyte % : x  Auto Eosinophil % : x  Auto Basophil % : x    12-30    139  |  104  |  22  ----------------------------<  140<H>  4.1   |  24  |  0.90    Ca    9.1      30 Dec 2022 05:36  Phos  2.5     12-30  Mg     1.9     12-30    TPro  6.5  /  Alb  3.5  /  TBili  0.6  /  DBili  x   /  AST  19  /  ALT  17  /  AlkPhos  69  12-28    PT/INR - ( 30 Dec 2022 05:36 )   PT: 13.2 sec;   INR: 1.14 ratio         PTT - ( 30 Dec 2022 05:36 )  PTT:20.2 sec      RADIOLOGY & ADDITIONAL STUDIES:  no chest imaging PULMONARY SERVICE INITIAL CONSULT NOTE    Dereje Miller  PGY-1 Resident Physician     Patient is a 87y old  Male who presents with a chief complaint of 88yo male with PMH of dementia, afib, BPH, HTN, HLD, and DM2 who [facility]. Found to have L2 fracture and associated L psoas hematoma. Admitted to [facility] . (30 Dec 2022 14:03)    C/S Reason: Pre-Opt Eval     HPI:   88yo Yoruba speaking male w/ PMH of Afib on xarelto & Alzhiemers presenting as transfer to [facility] from [facility] after mechanical fall. Noted GLF yesterday and associated L2 fracture & L psoas hematoma on scan. Received K-Centra at [facility] ED. Upon transfer to [facility], assessed by neurosurg for percutaneous T12-L4 instrumentation. Upon arrival in OR, pt exhibited labored breathing, necessitating transfer back to floor. SICU team provided Duoneb & Lasix while in unit. Pulm c/s for pre-opt.      SUBJECTIVE / OVERNIGHT EVENTS:  Remaining history obtained from son due to pt's m/s. No known PMH of pulmonary disease. Denied use of bronchodilators at home or additional oxygen needs. No known smoking history or environmental exposures. Pt has not had any prior history of similar admissions for new oxygen needs. No recent PNA or airway infections. ROS Unable to obtain due to pt's m/s. Pt currently on RA.     ROS:  Unable to obtain due to m/s    MEDICATIONS  (STANDING):  acetaminophen   IVPB .. 1000 milliGRAM(s) IV Intermittent every 6 hours  albuterol/ipratropium for Nebulization 3 milliLiter(s) Nebulizer every 4 hours  buDESOnide    Inhalation Suspension 0.25 milliGRAM(s) Inhalation every 12 hours  chlorhexidine 2% Cloths 1 Application(s) Topical daily  enoxaparin Injectable 40 milliGRAM(s) SubCutaneous every 24 hours  glucagon  Injectable 1 milliGRAM(s) IntraMuscular once  insulin lispro (ADMELOG) corrective regimen sliding scale   SubCutaneous every 6 hours  metoprolol tartrate Injectable 5 milliGRAM(s) IV Push every 6 hours    MEDICATIONS  (PRN):  HYDROmorphone  Injectable 0.5 milliGRAM(s) IV Push every 4 hours PRN Severe Pain (7 - 10)    Allergies    aspirin (Rash)    Intolerances        Vital Signs Last 24 Hrs  T(C): 36.8 (30 Dec 2022 11:10), Max: 37.2 (30 Dec 2022 08:35)  T(F): 98.2 (30 Dec 2022 11:10), Max: 99 (30 Dec 2022 08:35)  HR: 107 (30 Dec 2022 14:00) (15 - 121)  BP: 93/53 (30 Dec 2022 14:00) (93/53 - 151/75)  BP(mean): 67 (30 Dec 2022 14:00) (67 - 108)  RR: 22 (30 Dec 2022 14:00) (17 - 30)  SpO2: 98% (30 Dec 2022 14:00) (94% - 100%)    Parameters below as of 30 Dec 2022 12:06  Patient On (Oxygen Delivery Method): mask, aerosol      Daily     Daily   I&O's Summary    29 Dec 2022 07:01  -  30 Dec 2022 07:00  --------------------------------------------------------  IN: 600 mL / OUT: 2000 mL / NET: -1400 mL    30 Dec 2022 07:01  -  30 Dec 2022 15:01  --------------------------------------------------------  IN: 220 mL / OUT: 1090 mL / NET: -870 mL        Physical Exam  NEUROLOGICAL: A&O x 1.           NECK: No lymphadenopathy. Supple, symmetric and without tracheal deviation.   CARDIAC: RRR w/ normal S1 & S2. No murmurs. Radial & dorsal pedis pulses intact. No carotid bruits.   PULMONARY: CTA b/l w/o accessory muscle use.   GI: Soft, NT; noted distention.   RENAL: No lower extremity edema.    DIAGNOSTICS:                         9.6    10.04 )-----------( 159      ( 30 Dec 2022 12:02 )             29.7     Hgb Trend: 9.6<--, 8.7<--, 9.0<--, 9.1<--, 8.5<--  12-30    138  |  104  |  24<H>  ----------------------------<  187<H>  4.3   |  24  |  0.89    Ca    9.1      30 Dec 2022 12:02  Phos  2.6     12  Mg     1.9     12    TPro  6.5  /  Alb  3.5  /  TBili  0.6  /  DBili  x   /  AST  19  /  ALT  17  /  AlkPhos  69      CAPILLARY BLOOD GLUCOSE      POCT Blood Glucose.: 173 mg/dL (30 Dec 2022 11:50)  POCT Blood Glucose.: 136 mg/dL (30 Dec 2022 08:45)  POCT Blood Glucose.: 151 mg/dL (29 Dec 2022 21:14)  POCT Blood Glucose.: 163 mg/dL (29 Dec 2022 17:48)  POCT Blood Glucose.: 162 mg/dL (29 Dec 2022 17:33)    Creatinine Trend: 0.89<--, 0.90<--, 0.95<--, 1.12<--, 1.31<--  LIVER FUNCTIONS - ( 28 Dec 2022 19:48 )  Alb: 3.5 g/dL / Pro: 6.5 g/dL / ALK PHOS: 69 U/L / ALT: 17 U/L / AST: 19 U/L / GGT: x           PT/INR - ( 30 Dec 2022 05:36 )   PT: 13.2 sec;   INR: 1.14 ratio         PTT - ( 30 Dec 2022 05:36 )  PTT:20.2 sec      Urinalysis Basic - ( 30 Dec 2022 12:02 )    Color: Yellow / Appearance: Clear / S.021 / pH: x  Gluc: x / Ketone: Negative  / Bili: Negative / Urobili: 4 mg/dL   Blood: x / Protein: Trace / Nitrite: Negative   Leuk Esterase: Negative / RBC: x / WBC x   Sq Epi: x / Non Sq Epi: x / Bacteria: x           PULMONARY SERVICE INITIAL CONSULT NOTE    Dereje Miller  PGY-1 Resident Physician     Patient is a 87y old  Male who presents with a chief complaint of 86yo male with PMH of dementia, afib, BPH, HTN, HLD, and DM2 who [facility]. Found to have L2 fracture and associated L psoas hematoma. Admitted to [facility] . (30 Dec 2022 14:03)    C/S Reason: Pre-Opt Eval     HPI:   86yo Persian speaking male w/ PMH of Afib on xarelto & Alzhiemers presenting as transfer to [facility] from [facility] after mechanical fall. Noted GLF yesterday and associated L2 fracture & L psoas hematoma on scan. Received K-Centra at [facility] ED. Upon transfer to [facility], assessed by neurosurg for percutaneous T12-L4 instrumentation. Upon arrival in OR, pt exhibited labored breathing, necessitating transfer back to floor. SICU team provided Duoneb & Lasix while in unit. Pulm c/s for pre-op eval.     Patient's son at bedside. Provided additional history. No prior history of pulmonary issues.      SUBJECTIVE / OVERNIGHT EVENTS:  Remaining history obtained from son due to pt's m/s. No known PMH of pulmonary disease. Denied use of bronchodilators at home or additional oxygen needs. No known smoking history or environmental exposures. Pt has not had any prior history of similar admissions for new oxygen needs. No recent PNA or airway infections. ROS Unable to obtain due to poor mentation. Pt currently on RA.     ROS:  Unable to obtain due to poor mentation    MEDICATIONS  (STANDING):  acetaminophen   IVPB .. 1000 milliGRAM(s) IV Intermittent every 6 hours  albuterol/ipratropium for Nebulization 3 milliLiter(s) Nebulizer every 4 hours  buDESOnide    Inhalation Suspension 0.25 milliGRAM(s) Inhalation every 12 hours  chlorhexidine 2% Cloths 1 Application(s) Topical daily  enoxaparin Injectable 40 milliGRAM(s) SubCutaneous every 24 hours  glucagon  Injectable 1 milliGRAM(s) IntraMuscular once  insulin lispro (ADMELOG) corrective regimen sliding scale   SubCutaneous every 6 hours  metoprolol tartrate Injectable 5 milliGRAM(s) IV Push every 6 hours    MEDICATIONS  (PRN):  HYDROmorphone  Injectable 0.5 milliGRAM(s) IV Push every 4 hours PRN Severe Pain (7 - 10)    Allergies    aspirin (Rash)    Intolerances        Vital Signs Last 24 Hrs  T(C): 36.8 (30 Dec 2022 11:10), Max: 37.2 (30 Dec 2022 08:35)  T(F): 98.2 (30 Dec 2022 11:10), Max: 99 (30 Dec 2022 08:35)  HR: 107 (30 Dec 2022 14:00) (15 - 121)  BP: 93/53 (30 Dec 2022 14:00) (93/53 - 151/75)  BP(mean): 67 (30 Dec 2022 14:00) (67 - 108)  RR: 22 (30 Dec 2022 14:00) (17 - 30)  SpO2: 98% (30 Dec 2022 14:00) (94% - 100%)    Parameters below as of 30 Dec 2022 12:06  Patient On (Oxygen Delivery Method): mask, aerosol      Daily     Daily   I&O's Summary    29 Dec 2022 07:01  -  30 Dec 2022 07:00  --------------------------------------------------------  IN: 600 mL / OUT: 2000 mL / NET: -1400 mL    30 Dec 2022 07:01  -  30 Dec 2022 15:01  --------------------------------------------------------  IN: 220 mL / OUT: 1090 mL / NET: -870 mL        Physical Exam  GEN: NAD  Neck: Supple  HEENT: NC/AT  CVS: Irreg rhyhtm. Normal rate. No murmur. No edema.  PULM: CTAB, no wheezing, no stridor, no use of accessory muscles  GI: Soft, NT/ND  NEURO: Poor mentation. Moving extremities x 4. Unable to follow commands.    DIAGNOSTICS:                         9.6    10.04 )-----------( 159      ( 30 Dec 2022 12:02 )             29.7     Hgb Trend: 9.6<--, 8.7<--, 9.0<--, 9.1<--, 8.5<--  1230    138  |  104  |  24<H>  ----------------------------<  187<H>  4.3   |  24  |  0.89    Ca    9.1      30 Dec 2022 12:02  Phos  2.6       Mg     1.9         TPro  6.5  /  Alb  3.5  /  TBili  0.6  /  DBili  x   /  AST  19  /  ALT  17  /  AlkPhos  69      CAPILLARY BLOOD GLUCOSE      POCT Blood Glucose.: 173 mg/dL (30 Dec 2022 11:50)  POCT Blood Glucose.: 136 mg/dL (30 Dec 2022 08:45)  POCT Blood Glucose.: 151 mg/dL (29 Dec 2022 21:14)  POCT Blood Glucose.: 163 mg/dL (29 Dec 2022 17:48)  POCT Blood Glucose.: 162 mg/dL (29 Dec 2022 17:33)    Creatinine Trend: 0.89<--, 0.90<--, 0.95<--, 1.12<--, 1.31<--  LIVER FUNCTIONS - ( 28 Dec 2022 19:48 )  Alb: 3.5 g/dL / Pro: 6.5 g/dL / ALK PHOS: 69 U/L / ALT: 17 U/L / AST: 19 U/L / GGT: x           PT/INR - ( 30 Dec 2022 05:36 )   PT: 13.2 sec;   INR: 1.14 ratio         PTT - ( 30 Dec 2022 05:36 )  PTT:20.2 sec      Urinalysis Basic - ( 30 Dec 2022 12:02 )    Color: Yellow / Appearance: Clear / S.021 / pH: x  Gluc: x / Ketone: Negative  / Bili: Negative / Urobili: 4 mg/dL   Blood: x / Protein: Trace / Nitrite: Negative   Leuk Esterase: Negative / RBC: x / WBC x   Sq Epi: x / Non Sq Epi: x / Bacteria: x    CXR reviewed

## 2022-12-30 NOTE — DIETITIAN INITIAL EVALUATION ADULT - PERSON TAUGHT/METHOD
Diet education inappropriate at this time, patient upset and family attending to patient. Pt and family made aware RD remains available.

## 2022-12-30 NOTE — DIETITIAN INITIAL EVALUATION ADULT - OTHER INFO
Per family, patient's UBW ranges between 170 and 175 pounds. Dosing weight 189.6 pounds suggests weight gain -?accuracy. Will continue to monitor and trend weights as able.

## 2022-12-30 NOTE — DIETITIAN INITIAL EVALUATION ADULT - REASON FOR ADMISSION
88yo male with PMH of dementia, afib, BPH, HTN, HLD, and DM2 who presented as a transfer from [facility] after mechanical fall. Found to have L2 fracture and associated L psoas hematoma.   Admitted to [facility] 12/28.

## 2022-12-30 NOTE — CONSULT NOTE ADULT - ASSESSMENT
ASSESSMENT: 88yo M h/o dementia, Afib on Xarelto, presenting as transfer from [facility] after mechanical fall. Pt had a ground level fall yesterday, and was transferred here for higher level of care.      PLAN:   Neurologic:  - A/O x1  - Pain control tylenol and dilaudid     Respiratory:  - Wheezing  - pulmicort, duonebs q4  - Lasix 40  - CXR    Cardiovascular:  - afib   - 5 metoprolol q6     Gastrointestinal/Nutrition:  - NPO     Genitourinary/Renal:  - Drake, strict I/O  - UA   - Lasix 40  - F/u BMP replete prn     Hematologic:  - daily CBC   - VTE ppx lvx 40    Infectious Disease:  - F/u UA and Ucx   - F/u CXR  - WBC normal today 12/30 upon arrival in SICU, will trend daily  - No abx at this time     Endocrine:  - ISS  - Monitor glucose     Disposition:  - SICU  ASSESSMENT: 88yo M h/o dementia, Afib on Xarelto, presenting as transfer from [facility] after mechanical fall. Pt had a ground level fall yesterday, and was transferred here for higher level of care.  SICU consulted for worsening respiratory status.     PLAN:   Neurologic:  - A/O x1  - Pain control tylenol and dilaudid     Respiratory:  - Wheezing  - pulmicort, duonebs q4  - Lasix 40  - CXR  - pulm consult recs appreciated     Cardiovascular:  - afib   - 5 metoprolol q6     Gastrointestinal/Nutrition:  - NPO     Genitourinary/Renal:  - Drake, strict I/O  - UA   - Lasix 40  - F/u BMP replete prn     Hematologic:  - daily CBC   - VTE ppx lvx 40    Infectious Disease:  - F/u UA and Ucx   - F/u CXR  - WBC normal today 12/30 upon arrival in SICU, will trend daily  - No abx at this time     Endocrine:  - ISS  - Monitor glucose     Disposition:  - SICU

## 2022-12-30 NOTE — PROGRESS NOTE ADULT - ASSESSMENT
88yo M h/o dementia, Afib on Xarelto, BPH, HTN, HLD, DM2 presenting as transfer from [facility] after mechanical fall. Pt found to have L2 fracture, and small associated L psoas hematoma.    SOB, Wheezing, Bronchospasm   -obtain stat cxr, abg   -no history of smoking, copd or asthma  -patient persistently with afib rvr   -tte   -if cxr clear, consider nebs and / or steroids   -being transferred to SICU     Compression fracture of L2.   Pt found to have L2 fracture  Bedrest and spine precautions until MRI done and reviewed by nsgy  Hold xarelto   Neurosurgery follow up.  Management per NSGY    Preoperative clearance.   EKG no st/t changes   RCRI is calculated to be 1 (6.0%) and his Clay risk score is 1.0%, making him elevated risk for his indicated spinal surgery. His METS are <4.   Cardiology following     Hematoma.   Found to have large hematoma within expanded left psoas muscle extending into the left iliac is muscle.   Trend h/h   AC on hold.    Dementia.   -C/w aricept 10mg and namenda 5mg bid.    Hypertension.   C/w lisinopril 5mg.    Chronic atrial fibrillation.   Hold xarelto prior to surgery.    Diabetes mellitus.   - A1C 6.8   C/w SS  Takes metformin 500mg at home   DM well controlled, PMD follow up for medicaiton optimization, would not discharge on metformin.    Hyperlipemia.   Simvastatin 10mg.     PCP Dr. Shasta Victor  [phone]  Home meds: donepezil 10mg, sertraline 25mg, metformin 500mg qd, xaretlo 20mg, memantine 5mg bid, flomax 0.4mg, lisinopril 5mg, simvastatin 10mg.

## 2022-12-30 NOTE — PROGRESS NOTE ADULT - ASSESSMENT
86yo M h/o dementia, Afib on Xarelto, presenting as transfer from [facility] after mechanical fall, found to have L2 fracture, and small associated L psoas hematoma.    Plan:    - Hold Xarelto  - Appreciate neurosurg input for L2 fx, no contraindication for potential surgery from a trauma standpoint  - pulmonology clearance for surgery appreciated  - Appreciate excellent care per SICU    ACS/Trauma Surgery  Pager # [phone]

## 2022-12-30 NOTE — PROGRESS NOTE ADULT - ASSESSMENT
87M hx afib on xarelto and dementia presenting as trauma xfer. Fell off chair at dinner. Normally walks w/ walker but unable to walk since this AM. Prior scans showing L2 fx and RP hematoma w/ hypotension. Got Kcentra. 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 Exam: aox1, CROSS strongly at least 4+/5, reports back pain, no Stock, no clonus.   -adm trauma  -MR L spine with acute L2 fracture with associated edema with some edema in the posterior elements constituting an unstable fracture, maintain spine precautions  -Hold AC/AP, if retroperitoneal hematoma remains stable and the patient is otherwise medically optimized, plan for OR today for percutaneous T12-L4 instrumentation

## 2022-12-30 NOTE — PROGRESS NOTE ADULT - SUBJECTIVE AND OBJECTIVE BOX
Patient seen and examined at bedside.    --Anticoagulation--    T(C): 37.2 (12-29-22 @ 04:30), Max: 37.2 (12-29-22 @ 04:30)  HR: 106 (12-29-22 @ 04:30) (82 - 109)  BP: 138/85 (12-29-22 @ 04:30) (88/55 - 149/80)  RR: 18 (12-29-22 @ 04:30) (17 - 25)  SpO2: 96% (12-29-22 @ 04:30) (94% - 99%)  Wt(kg): --    Exam: AOx1 (dementia), intermittently FC, CROSS very strongly , no annette, no clonus, Kyrgyz speaking

## 2022-12-31 ENCOUNTER — APPOINTMENT (OUTPATIENT)
Dept: NEUROSURGERY | Facility: HOSPITAL | Age: 87
End: 2022-12-31

## 2022-12-31 LAB
ALBUMIN SERPL ELPH-MCNC: 3.3 G/DL — SIGNIFICANT CHANGE UP (ref 3.3–5)
ALP SERPL-CCNC: 73 U/L — SIGNIFICANT CHANGE UP (ref 40–120)
ALT FLD-CCNC: 21 U/L — SIGNIFICANT CHANGE UP (ref 10–45)
ANION GAP SERPL CALC-SCNC: 13 MMOL/L — SIGNIFICANT CHANGE UP (ref 5–17)
ANION GAP SERPL CALC-SCNC: 14 MMOL/L — SIGNIFICANT CHANGE UP (ref 5–17)
ANION GAP SERPL CALC-SCNC: 14 MMOL/L — SIGNIFICANT CHANGE UP (ref 5–17)
AST SERPL-CCNC: 27 U/L — SIGNIFICANT CHANGE UP (ref 10–40)
BILIRUB SERPL-MCNC: 0.7 MG/DL — SIGNIFICANT CHANGE UP (ref 0.2–1.2)
BLD GP AB SCN SERPL QL: NEGATIVE — SIGNIFICANT CHANGE UP
BUN SERPL-MCNC: 28 MG/DL — HIGH (ref 7–23)
BUN SERPL-MCNC: 28 MG/DL — HIGH (ref 7–23)
BUN SERPL-MCNC: 30 MG/DL — HIGH (ref 7–23)
CALCIUM SERPL-MCNC: 8.5 MG/DL — SIGNIFICANT CHANGE UP (ref 8.4–10.5)
CALCIUM SERPL-MCNC: 8.6 MG/DL — SIGNIFICANT CHANGE UP (ref 8.4–10.5)
CALCIUM SERPL-MCNC: 8.9 MG/DL — SIGNIFICANT CHANGE UP (ref 8.4–10.5)
CHLORIDE SERPL-SCNC: 101 MMOL/L — SIGNIFICANT CHANGE UP (ref 96–108)
CO2 SERPL-SCNC: 23 MMOL/L — SIGNIFICANT CHANGE UP (ref 22–31)
CO2 SERPL-SCNC: 24 MMOL/L — SIGNIFICANT CHANGE UP (ref 22–31)
CO2 SERPL-SCNC: 24 MMOL/L — SIGNIFICANT CHANGE UP (ref 22–31)
CREAT SERPL-MCNC: 0.87 MG/DL — SIGNIFICANT CHANGE UP (ref 0.5–1.3)
CREAT SERPL-MCNC: 0.91 MG/DL — SIGNIFICANT CHANGE UP (ref 0.5–1.3)
CREAT SERPL-MCNC: 0.94 MG/DL — SIGNIFICANT CHANGE UP (ref 0.5–1.3)
EGFR: 79 ML/MIN/1.73M2 — SIGNIFICANT CHANGE UP
EGFR: 82 ML/MIN/1.73M2 — SIGNIFICANT CHANGE UP
EGFR: 84 ML/MIN/1.73M2 — SIGNIFICANT CHANGE UP
GAS PNL BLDA: SIGNIFICANT CHANGE UP
GLUCOSE BLDC GLUCOMTR-MCNC: 124 MG/DL — HIGH (ref 70–99)
GLUCOSE BLDC GLUCOMTR-MCNC: 137 MG/DL — HIGH (ref 70–99)
GLUCOSE BLDC GLUCOMTR-MCNC: 222 MG/DL — HIGH (ref 70–99)
GLUCOSE BLDC GLUCOMTR-MCNC: 282 MG/DL — HIGH (ref 70–99)
GLUCOSE SERPL-MCNC: 160 MG/DL — HIGH (ref 70–99)
GLUCOSE SERPL-MCNC: 231 MG/DL — HIGH (ref 70–99)
GLUCOSE SERPL-MCNC: 238 MG/DL — HIGH (ref 70–99)
HCT VFR BLD CALC: 25.3 % — LOW (ref 39–50)
HCT VFR BLD CALC: 26.2 % — LOW (ref 39–50)
HCT VFR BLD CALC: 27.4 % — LOW (ref 39–50)
HGB BLD-MCNC: 8.3 G/DL — LOW (ref 13–17)
HGB BLD-MCNC: 8.7 G/DL — LOW (ref 13–17)
HGB BLD-MCNC: 9 G/DL — LOW (ref 13–17)
MAGNESIUM SERPL-MCNC: 2.1 MG/DL — SIGNIFICANT CHANGE UP (ref 1.6–2.6)
MAGNESIUM SERPL-MCNC: 2.1 MG/DL — SIGNIFICANT CHANGE UP (ref 1.6–2.6)
MAGNESIUM SERPL-MCNC: 2.2 MG/DL — SIGNIFICANT CHANGE UP (ref 1.6–2.6)
MCHC RBC-ENTMCNC: 30.3 PG — SIGNIFICANT CHANGE UP (ref 27–34)
MCHC RBC-ENTMCNC: 30.6 PG — SIGNIFICANT CHANGE UP (ref 27–34)
MCHC RBC-ENTMCNC: 30.9 PG — SIGNIFICANT CHANGE UP (ref 27–34)
MCHC RBC-ENTMCNC: 32.8 GM/DL — SIGNIFICANT CHANGE UP (ref 32–36)
MCHC RBC-ENTMCNC: 32.8 GM/DL — SIGNIFICANT CHANGE UP (ref 32–36)
MCHC RBC-ENTMCNC: 33.2 GM/DL — SIGNIFICANT CHANGE UP (ref 32–36)
MCV RBC AUTO: 92.3 FL — SIGNIFICANT CHANGE UP (ref 80–100)
MCV RBC AUTO: 92.9 FL — SIGNIFICANT CHANGE UP (ref 80–100)
MCV RBC AUTO: 93.2 FL — SIGNIFICANT CHANGE UP (ref 80–100)
NRBC # BLD: 0 /100 WBCS — SIGNIFICANT CHANGE UP (ref 0–0)
PHOSPHATE SERPL-MCNC: 3.7 MG/DL — SIGNIFICANT CHANGE UP (ref 2.5–4.5)
PHOSPHATE SERPL-MCNC: 4.3 MG/DL — SIGNIFICANT CHANGE UP (ref 2.5–4.5)
PHOSPHATE SERPL-MCNC: 4.9 MG/DL — HIGH (ref 2.5–4.5)
PLATELET # BLD AUTO: 151 K/UL — SIGNIFICANT CHANGE UP (ref 150–400)
PLATELET # BLD AUTO: 161 K/UL — SIGNIFICANT CHANGE UP (ref 150–400)
PLATELET # BLD AUTO: 163 K/UL — SIGNIFICANT CHANGE UP (ref 150–400)
POTASSIUM SERPL-MCNC: 4.2 MMOL/L — SIGNIFICANT CHANGE UP (ref 3.5–5.3)
POTASSIUM SERPL-MCNC: 4.3 MMOL/L — SIGNIFICANT CHANGE UP (ref 3.5–5.3)
POTASSIUM SERPL-MCNC: 4.3 MMOL/L — SIGNIFICANT CHANGE UP (ref 3.5–5.3)
POTASSIUM SERPL-SCNC: 4.2 MMOL/L — SIGNIFICANT CHANGE UP (ref 3.5–5.3)
POTASSIUM SERPL-SCNC: 4.3 MMOL/L — SIGNIFICANT CHANGE UP (ref 3.5–5.3)
POTASSIUM SERPL-SCNC: 4.3 MMOL/L — SIGNIFICANT CHANGE UP (ref 3.5–5.3)
PROT SERPL-MCNC: 6.6 G/DL — SIGNIFICANT CHANGE UP (ref 6–8.3)
RBC # BLD: 2.74 M/UL — LOW (ref 4.2–5.8)
RBC # BLD: 2.82 M/UL — LOW (ref 4.2–5.8)
RBC # BLD: 2.94 M/UL — LOW (ref 4.2–5.8)
RBC # FLD: 13.2 % — SIGNIFICANT CHANGE UP (ref 10.3–14.5)
RBC # FLD: 13.2 % — SIGNIFICANT CHANGE UP (ref 10.3–14.5)
RBC # FLD: 13.4 % — SIGNIFICANT CHANGE UP (ref 10.3–14.5)
RH IG SCN BLD-IMP: POSITIVE — SIGNIFICANT CHANGE UP
SODIUM SERPL-SCNC: 138 MMOL/L — SIGNIFICANT CHANGE UP (ref 135–145)
SODIUM SERPL-SCNC: 138 MMOL/L — SIGNIFICANT CHANGE UP (ref 135–145)
SODIUM SERPL-SCNC: 139 MMOL/L — SIGNIFICANT CHANGE UP (ref 135–145)
WBC # BLD: 7.61 K/UL — SIGNIFICANT CHANGE UP (ref 3.8–10.5)
WBC # BLD: 8.8 K/UL — SIGNIFICANT CHANGE UP (ref 3.8–10.5)
WBC # BLD: 8.86 K/UL — SIGNIFICANT CHANGE UP (ref 3.8–10.5)
WBC # FLD AUTO: 7.61 K/UL — SIGNIFICANT CHANGE UP (ref 3.8–10.5)
WBC # FLD AUTO: 8.8 K/UL — SIGNIFICANT CHANGE UP (ref 3.8–10.5)
WBC # FLD AUTO: 8.86 K/UL — SIGNIFICANT CHANGE UP (ref 3.8–10.5)

## 2022-12-31 PROCEDURE — 99232 SBSQ HOSP IP/OBS MODERATE 35: CPT

## 2022-12-31 PROCEDURE — 61783 SCAN PROC SPINAL: CPT

## 2022-12-31 PROCEDURE — 22315 CLOSED TX VERT FX W/MANJ: CPT

## 2022-12-31 PROCEDURE — 72020 X-RAY EXAM OF SPINE 1 VIEW: CPT | Mod: 26

## 2022-12-31 PROCEDURE — 22842 INSERT SPINE FIXATION DEVICE: CPT

## 2022-12-31 DEVICE — SCREW VOYAGER MULTIAXIAL 6.5X50MM: Type: IMPLANTABLE DEVICE | Status: FUNCTIONAL

## 2022-12-31 DEVICE — IMPLANTABLE DEVICE: Type: IMPLANTABLE DEVICE | Status: FUNCTIONAL

## 2022-12-31 DEVICE — SURGIFLO MATRIX WITH THROMBIN KIT: Type: IMPLANTABLE DEVICE | Status: FUNCTIONAL

## 2022-12-31 DEVICE — SCREW VOYAGER MULTIAXIAL 5.5X50MM: Type: IMPLANTABLE DEVICE | Status: FUNCTIONAL

## 2022-12-31 DEVICE — SCREW VOYAGER MULTIAXIAL 6.5X55MM: Type: IMPLANTABLE DEVICE | Status: FUNCTIONAL

## 2022-12-31 DEVICE — KIT A-LINE 1LUM 20G X 12CM SAFE KIT: Type: IMPLANTABLE DEVICE | Status: FUNCTIONAL

## 2022-12-31 DEVICE — SET SCREW SOLERA VOYAGER 5.5/6MM: Type: IMPLANTABLE DEVICE | Status: FUNCTIONAL

## 2022-12-31 RX ORDER — METOPROLOL TARTRATE 50 MG
25 TABLET ORAL EVERY 12 HOURS
Refills: 0 | Status: DISCONTINUED | OUTPATIENT
Start: 2023-01-01 | End: 2023-01-02

## 2022-12-31 RX ORDER — SODIUM CHLORIDE 9 MG/ML
4 INJECTION INTRAMUSCULAR; INTRAVENOUS; SUBCUTANEOUS EVERY 12 HOURS
Refills: 0 | Status: DISCONTINUED | OUTPATIENT
Start: 2022-12-31 | End: 2023-01-09

## 2022-12-31 RX ORDER — CHLORHEXIDINE GLUCONATE 213 G/1000ML
1 SOLUTION TOPICAL DAILY
Refills: 0 | Status: DISCONTINUED | OUTPATIENT
Start: 2022-12-31 | End: 2022-12-31

## 2022-12-31 RX ORDER — CEFAZOLIN SODIUM 1 G
2000 VIAL (EA) INJECTION EVERY 8 HOURS
Refills: 0 | Status: COMPLETED | OUTPATIENT
Start: 2022-12-31 | End: 2023-01-01

## 2022-12-31 RX ORDER — CALCIUM GLUCONATE 100 MG/ML
2 VIAL (ML) INTRAVENOUS ONCE
Refills: 0 | Status: COMPLETED | OUTPATIENT
Start: 2022-12-31 | End: 2022-12-31

## 2022-12-31 RX ORDER — CHLORHEXIDINE GLUCONATE 213 G/1000ML
1 SOLUTION TOPICAL DAILY
Refills: 0 | Status: DISCONTINUED | OUTPATIENT
Start: 2022-12-31 | End: 2023-01-02

## 2022-12-31 RX ORDER — IPRATROPIUM/ALBUTEROL SULFATE 18-103MCG
3 AEROSOL WITH ADAPTER (GRAM) INHALATION EVERY 4 HOURS
Refills: 0 | Status: DISCONTINUED | OUTPATIENT
Start: 2022-12-31 | End: 2023-01-06

## 2022-12-31 RX ORDER — HYDROMORPHONE HYDROCHLORIDE 2 MG/ML
0.5 INJECTION INTRAMUSCULAR; INTRAVENOUS; SUBCUTANEOUS EVERY 4 HOURS
Refills: 0 | Status: DISCONTINUED | OUTPATIENT
Start: 2022-12-31 | End: 2022-12-31

## 2022-12-31 RX ORDER — BUDESONIDE, MICRONIZED 100 %
0.25 POWDER (GRAM) MISCELLANEOUS EVERY 12 HOURS
Refills: 0 | Status: DISCONTINUED | OUTPATIENT
Start: 2022-12-31 | End: 2023-01-10

## 2022-12-31 RX ORDER — SODIUM CHLORIDE 9 MG/ML
1000 INJECTION, SOLUTION INTRAVENOUS
Refills: 0 | Status: DISCONTINUED | OUTPATIENT
Start: 2022-12-31 | End: 2023-01-01

## 2022-12-31 RX ORDER — FOLIC ACID 0.8 MG
1 TABLET ORAL DAILY
Refills: 0 | Status: DISCONTINUED | OUTPATIENT
Start: 2022-12-31 | End: 2023-01-10

## 2022-12-31 RX ORDER — TAMSULOSIN HYDROCHLORIDE 0.4 MG/1
0.4 CAPSULE ORAL AT BEDTIME
Refills: 0 | Status: DISCONTINUED | OUTPATIENT
Start: 2022-12-31 | End: 2023-01-10

## 2022-12-31 RX ORDER — ENOXAPARIN SODIUM 100 MG/ML
40 INJECTION SUBCUTANEOUS
Refills: 0 | Status: DISCONTINUED | OUTPATIENT
Start: 2023-01-01 | End: 2023-01-10

## 2022-12-31 RX ORDER — ENOXAPARIN SODIUM 100 MG/ML
40 INJECTION SUBCUTANEOUS EVERY 24 HOURS
Refills: 0 | Status: DISCONTINUED | OUTPATIENT
Start: 2022-12-31 | End: 2022-12-31

## 2022-12-31 RX ORDER — METOPROLOL TARTRATE 50 MG
5 TABLET ORAL EVERY 6 HOURS
Refills: 0 | Status: DISCONTINUED | OUTPATIENT
Start: 2022-12-31 | End: 2022-12-31

## 2022-12-31 RX ORDER — SODIUM CHLORIDE 9 MG/ML
4 INJECTION INTRAMUSCULAR; INTRAVENOUS; SUBCUTANEOUS EVERY 6 HOURS
Refills: 0 | Status: DISCONTINUED | OUTPATIENT
Start: 2022-12-31 | End: 2022-12-31

## 2022-12-31 RX ORDER — ACETAMINOPHEN 500 MG
650 TABLET ORAL EVERY 6 HOURS
Refills: 0 | Status: DISCONTINUED | OUTPATIENT
Start: 2022-12-31 | End: 2023-01-10

## 2022-12-31 RX ORDER — FUROSEMIDE 40 MG
40 TABLET ORAL ONCE
Refills: 0 | Status: COMPLETED | OUTPATIENT
Start: 2022-12-31 | End: 2022-12-31

## 2022-12-31 RX ORDER — SODIUM CHLORIDE 9 MG/ML
1000 INJECTION, SOLUTION INTRAVENOUS
Refills: 0 | Status: DISCONTINUED | OUTPATIENT
Start: 2022-12-31 | End: 2022-12-31

## 2022-12-31 RX ORDER — INSULIN LISPRO 100/ML
VIAL (ML) SUBCUTANEOUS EVERY 6 HOURS
Refills: 0 | Status: DISCONTINUED | OUTPATIENT
Start: 2022-12-31 | End: 2023-01-02

## 2022-12-31 RX ADMIN — Medication 3 MILLILITER(S): at 09:49

## 2022-12-31 RX ADMIN — SODIUM CHLORIDE 30 MILLILITER(S): 9 INJECTION, SOLUTION INTRAVENOUS at 09:27

## 2022-12-31 RX ADMIN — Medication 1000 MILLIGRAM(S): at 07:00

## 2022-12-31 RX ADMIN — Medication 3 MILLILITER(S): at 02:13

## 2022-12-31 RX ADMIN — CHLORHEXIDINE GLUCONATE 1 APPLICATION(S): 213 SOLUTION TOPICAL at 17:06

## 2022-12-31 RX ADMIN — Medication 6: at 00:36

## 2022-12-31 RX ADMIN — SODIUM CHLORIDE 30 MILLILITER(S): 9 INJECTION, SOLUTION INTRAVENOUS at 17:06

## 2022-12-31 RX ADMIN — Medication 3 MILLILITER(S): at 17:35

## 2022-12-31 RX ADMIN — TAMSULOSIN HYDROCHLORIDE 0.4 MILLIGRAM(S): 0.4 CAPSULE ORAL at 22:21

## 2022-12-31 RX ADMIN — Medication 5 MILLIGRAM(S): at 06:34

## 2022-12-31 RX ADMIN — Medication 5 MILLIGRAM(S): at 00:36

## 2022-12-31 RX ADMIN — HYDROMORPHONE HYDROCHLORIDE 0.5 MILLIGRAM(S): 2 INJECTION INTRAMUSCULAR; INTRAVENOUS; SUBCUTANEOUS at 06:39

## 2022-12-31 RX ADMIN — Medication 100 MILLIGRAM(S): at 22:19

## 2022-12-31 RX ADMIN — Medication 3 MILLILITER(S): at 05:31

## 2022-12-31 RX ADMIN — Medication 5 MILLIGRAM(S): at 17:09

## 2022-12-31 RX ADMIN — HYDROMORPHONE HYDROCHLORIDE 0.5 MILLIGRAM(S): 2 INJECTION INTRAMUSCULAR; INTRAVENOUS; SUBCUTANEOUS at 07:09

## 2022-12-31 RX ADMIN — Medication 0.25 MILLIGRAM(S): at 17:35

## 2022-12-31 RX ADMIN — Medication 4: at 17:12

## 2022-12-31 RX ADMIN — CHLORHEXIDINE GLUCONATE 1 APPLICATION(S): 213 SOLUTION TOPICAL at 20:12

## 2022-12-31 RX ADMIN — Medication 40 MILLIGRAM(S): at 09:27

## 2022-12-31 RX ADMIN — Medication 400 MILLIGRAM(S): at 00:35

## 2022-12-31 RX ADMIN — Medication 100 MILLIGRAM(S): at 20:12

## 2022-12-31 RX ADMIN — Medication 3 MILLILITER(S): at 21:50

## 2022-12-31 RX ADMIN — Medication 200 GRAM(S): at 20:11

## 2022-12-31 RX ADMIN — Medication 4: at 23:47

## 2022-12-31 RX ADMIN — Medication 400 MILLIGRAM(S): at 06:35

## 2022-12-31 RX ADMIN — SODIUM CHLORIDE 30 MILLILITER(S): 9 INJECTION, SOLUTION INTRAVENOUS at 20:00

## 2022-12-31 RX ADMIN — Medication 0.25 MILLIGRAM(S): at 05:30

## 2022-12-31 NOTE — PROGRESS NOTE ADULT - SUBJECTIVE AND OBJECTIVE BOX
24 HOUR EVENTS:  - pulm optimization  -ECHO: mild pulm HTN, EF 63%    HISTORY:  88yo M h/o dementia, Afib on Xarelto, presenting as transfer from [facility] after mechanical fall. Pt had a ground level fall yesterday, and was transferred here for higher level of care.  Got Kcentra at [facility].  Found to have L2 spine fracture, RP hematoma.  Planned for OR but due to wheezing, SOB, worsening resp status, OR was deferred and SICU consulted for management of respiratory distress.     SUBJECTIVE/ROS:  [ x] A ten-point review of systems was otherwise negative except as noted.  [ ] Due to altered mental status/intubation, subjective information were not able to be obtained from the patient. History was obtained, to the extent possible, from review of the chart and collateral sources of information.      NEURO  Exam: awake, alert, oriented  Meds: acetaminophen   IVPB .. 1000 milliGRAM(s) IV Intermittent every 6 hours  HYDROmorphone  Injectable 0.5 milliGRAM(s) IV Push every 4 hours PRN Severe Pain (7 - 10)    [x] Adequacy of sedation and pain control has been assessed and adjusted      RESPIRATORY  RR: 17 (12-31-22 @ 00:00) (15 - 30)  SpO2: 100% (12-31-22 @ 00:00) (94% - 100%)  Exam: unlabored, clear to auscultation bilaterally  Mechanical Ventilation:   ABG - ( 29 Dec 2022 15:43 )  pH: x     /  pCO2: x     /  pO2: x     / HCO3: x     / Base Excess: x     /  SaO2: x       Lactate: 2.0      [N/A] Extubation Readiness Assessed  Meds: albuterol/ipratropium for Nebulization 3 milliLiter(s) Nebulizer every 4 hours  buDESOnide    Inhalation Suspension 0.25 milliGRAM(s) Inhalation every 12 hours      CARDIOVASCULAR  HR: 97 (12-31-22 @ 00:00) (15 - 121)  BP: 121/55 (12-31-22 @ 00:00) (93/53 - 148/76)  BP(mean): 79 (12-31-22 @ 00:00) (67 - 108)    Exam: regular rate and rhythm  Cardiac Rhythm: sinus  Perfusion     [x]Adequate   [ ]Inadequate  Mentation   [x]Normal       [ ]Reduced  Extremities  [x]Warm         [ ]Cool  Volume Status [ ]Hypervolemic [x]Euvolemic [ ]Hypovolemic  Meds: metoprolol tartrate Injectable 5 milliGRAM(s) IV Push every 6 hours        GI/NUTRITION  Exam: soft, nontender, nondistended, incision C/D/I  Diet: npo  Meds:     GENITOURINARY  I&O's Detail    12-29 @ 07:01  -  12-30 @ 07:00  --------------------------------------------------------  IN:    Oral Fluid: 600 mL  Total IN: 600 mL    OUT:    Voided (mL): 2000 mL  Total OUT: 2000 mL    Total NET: -1400 mL      12-30 @ 07:01  -  12-31 @ 01:14  --------------------------------------------------------  IN:    IV PiggyBack: 550 mL    Oral Fluid: 220 mL  Total IN: 770 mL    OUT:    Indwelling Catheter - Urethral (mL): 1105 mL    Voided (mL): 480 mL  Total OUT: 1585 mL    Total NET: -815 mL          12-30    138  |  104  |  24<H>  ----------------------------<  187<H>  4.3   |  24  |  0.89    Ca    9.1      30 Dec 2022 12:02  Phos  2.6     12-30  Mg     1.9     12-30      [ ] Drake catheter, indication: N/A  Meds:       HEMATOLOGIC  Meds: enoxaparin Injectable 40 milliGRAM(s) SubCutaneous every 24 hours    [x] VTE Prophylaxis                        9.0    8.86  )-----------( 151      ( 31 Dec 2022 00:53 )             27.4     PT/INR - ( 30 Dec 2022 05:36 )   PT: 13.2 sec;   INR: 1.14 ratio         PTT - ( 30 Dec 2022 05:36 )  PTT:20.2 sec  Transfusion     [ ] PRBC   [ ] Platelets   [ ] FFP   [ ] Cryoprecipitate      INFECTIOUS DISEASES  WBC Count: 8.86 K/uL (12-31 @ 00:53)  WBC Count: 10.04 K/uL (12-30 @ 12:02)  WBC Count: 8.46 K/uL (12-30 @ 05:36)    RECENT CULTURES:    Meds:       ENDOCRINE  CAPILLARY BLOOD GLUCOSE      POCT Blood Glucose.: 282 mg/dL (31 Dec 2022 00:31)  POCT Blood Glucose.: 228 mg/dL (30 Dec 2022 17:16)  POCT Blood Glucose.: 173 mg/dL (30 Dec 2022 11:50)  POCT Blood Glucose.: 136 mg/dL (30 Dec 2022 08:45)    Meds: insulin lispro (ADMELOG) corrective regimen sliding scale   SubCutaneous every 6 hours        ACCESS DEVICES:  [ ] Peripheral IV  [ ] Central Venous Line	[ ] R	[ ] L	[ ] IJ	[ ] Fem	[ ] SC	Placed:   [ ] Arterial Line		[ ] R	[ ] L	[ ] Fem	[ ] Rad	[ ] Ax	Placed:   [ ] PICC:					[ ] Mediport  [ ] Urinary Catheter, Date Placed:   [x] Necessity of urinary, arterial, and venous catheters discussed    OTHER MEDICATIONS:  chlorhexidine 2% Cloths 1 Application(s) Topical daily      CODE STATUS:    full code

## 2022-12-31 NOTE — PROGRESS NOTE ADULT - ATTENDING COMMENTS
86yo M h/o dementia, Afib on Xarelto, presenting as transfer from [facility] after mechanical fall and found to have  L2 fx and three column injury. Went to OR for fixation but case cancelled sec to audible wheeze.  CXR reviewed, has mild pul vascular congestion with pBNP 3994.   Pt denies smoking history    Awake and alert  One more dose IV Lasix, start gentle hydration while NPO  Empiric Duoneb and Pulmicort  Stable pulmonary critical care wise for neuro spine procedure

## 2022-12-31 NOTE — PROGRESS NOTE ADULT - SUBJECTIVE AND OBJECTIVE BOX
SUMMARY:    HOSPITAL COURSE:    24 HOUR EVENTS:     ADMISSION SCORES:   GCS: HH: MF: NIHSS: ICH Score:    SEDATION SCORES:  RASS: CAM-ICU:     REVIEW OF SYSTEMS:    ALLERGIES: Allergies    aspirin (Rash)    Intolerances        VITALS/DATA/ORDERS: [] Reviewed    DEVICES:   [] Restraints [] PIVs [] ET tube [] central line [] PICC [] arterial line [] wilson [] NGT/OGT [] EVD [] LD [] MARCELINO/HMV [] LiCOX [] ICP monitor [] Trach [] PEG [] Chest Tube [] other:    EXAMINATION:  General: No acute distress  HEENT: Anicteric sclerae  Cardiac: [id number]  Lungs: Clear  Abdomen: Soft, non-tender, +BS  Extremities: No c/c/e  Skin/Incision Site: Clean, dry and intact  Neurologic: Awake, alert, fully oriented, follows commands, PERRL, VFFtc, EOMI, face symmetric, tongue midline, no drift, full strength SUMMARY: 88yo Romanian speaking male w/ PMH of Afib on xarelto & Alzhiemers presenting as transfer to [facility] from [facility] after mechanical fall. Noted GLF yesterday and associated L2 fracture & L psoas hematoma on scan. Received K-Centra at [facility] ED. Upon transfer to [facility], assessed by neurosurg for percutaneous T12-L4 instrumentation. Upon arrival in OR, pt exhibited labored breathing, necessitating transfer back to floor. SICU team provided Duoneb & Lasix while in unit. Pulm c/s for pre-op eval.     HOSPITAL COURSE: s/p L1-L4 percutaneous fusion    24 HOUR EVENTS: seen in nscu    REVIEW OF SYSTEMS: unobtainable pt demented    ALLERGIES: Allergies    aspirin (Rash)    Intolerances        VITALS/DATA/ORDERS: [x] Reviewed    DEVICES:   [] Restraints [x] PIVs [] ET tube [] central line [] PICC [] arterial line [x] wilson [] NGT/OGT [] EVD [] LD [] MARCELINO/HMV [] LiCOX [] ICP monitor [] Trach [] PEG [] Chest Tube [] other:    EXAMINATION:  General: No acute distress  HEENT: Anicteric sclerae  Cardiac: [id number]  Lungs: Clear  Abdomen: Soft, non-tender, +BS  Extremities: No c/c/e  Skin/Incision Site: Clean, dry and intact  Neurologic: AOx1 (dementia), intermittently FC, CROSS very strongly , no annette, no clonus, Romanian speaking SUMMARY: 86yo Chadian speaking male w/ PMH of Afib on xarelto & Alzhiemers presenting as transfer to [facility] from [facility] after mechanical fall. Noted GLF yesterday and associated L2 fracture & L psoas hematoma on scan. Received K-Centra at [facility] ED. Upon transfer to [facility], assessed by neurosurg for percutaneous T12-L4 instrumentation. Upon arrival in OR, pt exhibited labored breathing, necessitating transfer back to floor. SICU team provided Duoneb & Lasix while in unit. Pulm c/s for pre-op eval.     HOSPITAL COURSE: s/p L1-L4 percutaneous fusion    24 HOUR EVENTS: seen in nscu    REVIEW OF SYSTEMS: unobtainable pt demented    ALLERGIES: Allergies    aspirin (Rash)    Intolerances        VITALS/DATA/ORDERS: [x] Reviewed    DEVICES:   [] Restraints [x] PIVs [] ET tube [] central line [] PICC [] arterial line [x] wilson [] NGT/OGT [] EVD [] LD [] MARCELINO/HMV [] LiCOX [] ICP monitor [] Trach [] PEG [] Chest Tube [] other:    EXAMINATION:  General: No acute distress  HEENT: Anicteric sclerae  Cardiac: [id number]  Lungs: Clear  Abdomen: Soft, non-tender, +BS  Extremities: No c/c/e  Skin/Incision Site: Clean, dry and intact  Neurologic: AOx2 name and place(dementia), FC, CROSS, BUE distally AG 4/5, LLE distally AG, RLE 2/5 on hip and knee, DF/PF 5/5 bilat , no annette, no clonus, Chadian speaking SUMMARY: 86yo Vietnamese speaking male w/ PMH of Afib on xarelto & Alzhiemers presenting as transfer to [facility] from [facility] after mechanical fall. Noted GLF yesterday and associated L2 fracture & L psoas hematoma on scan. Received K-Centra at [facility] ED. Upon transfer to [facility], assessed by neurosurg for percutaneous T12-L4 instrumentation. Upon arrival in OR, pt exhibited labored breathing, necessitating transfer back to floor. SICU team provided Duoneb & Lasix while in unit. Pulm c/s for pre-op eval.     HOSPITAL COURSE: s/p L1-L4 percutaneous fusion    24 HOUR EVENTS: seen in nscu    REVIEW OF SYSTEMS: unobtainable pt demented    ALLERGIES: Allergies    aspirin (Rash)    Intolerances        VITALS/DATA/ORDERS: [x] Reviewed    DEVICES:   [] Restraints [x] PIVs [] ET tube [] central line [] PICC [] arterial line [x] wilson [] NGT/OGT [] EVD [] LD [] MARCELINO/HMV [] LiCOX [] ICP monitor [] Trach [] PEG [] Chest Tube [] other:    EXAMINATION: w family in [place]  General: No acute distress  HEENT: Anicteric sclerae  Cardiac: [id number]  Lungs: Clear  Abdomen: Soft, non-tender, +BS  Extremities: No c/c/e  Skin/Incision Site: Clean, dry and intact  Neurologic: AOx2 name and place(dementia), FC, CROSS, BUE distally AG 4/5, LLE distally AG, RLE 2/5 on hip and knee, DF/PF 5/5 bilat , no annette, no clonus, Vietnamese speaking

## 2022-12-31 NOTE — PROGRESS NOTE ADULT - SUBJECTIVE AND OBJECTIVE BOX
Trauma Surgery Progress Note  Patient is a 87y old  Male who presents with a chief complaint of Transfer level 2 trauma (31 Dec 2022 15:16)      INTERVAL EVENTS: No acute events overnight.  SUBJECTIVE: Patient seen and examined at bedside with surgical team, patient without complaints. Denies fever, chills, CP, SOB, nausea, vomiting, abdominal pain.    OBJECTIVE:    Vital Signs Last 24 Hrs  T(C): 37.2 (31 Dec 2022 15:41), Max: 37.2 (31 Dec 2022 15:41)  T(F): 99 (31 Dec 2022 15:41), Max: 99 (31 Dec 2022 15:41)  HR: 120 (31 Dec 2022 17:00) (79 - 122)  BP: 120/66 (31 Dec 2022 17:00) (97/56 - 159/70)  BP(mean): 85 (31 Dec 2022 17:00) (73 - 101)  RR: 16 (31 Dec 2022 17:00) (14 - 22)  SpO2: 99% (31 Dec 2022 17:00) (94% - 100%)    Parameters below as of 31 Dec 2022 15:45  Patient On (Oxygen Delivery Method): nasal cannula  O2 Flow (L/min): 2  I&O's Detail    30 Dec 2022 07:01  -  31 Dec 2022 07:00  --------------------------------------------------------  IN:    IV PiggyBack: 650 mL    Oral Fluid: 220 mL  Total IN: 870 mL    OUT:    Indwelling Catheter - Urethral (mL): 1305 mL    Voided (mL): 480 mL  Total OUT: 1785 mL    Total NET: -915 mL      31 Dec 2022 07:01  -  31 Dec 2022 17:15  --------------------------------------------------------  IN:    Lactated Ringers: 60 mL  Total IN: 60 mL    OUT:    Indwelling Catheter - Urethral (mL): 850 mL  Total OUT: 850 mL    Total NET: -790 mL      MEDICATIONS  (STANDING):  albuterol/ipratropium for Nebulization 3 milliLiter(s) Nebulizer every 4 hours  buDESOnide    Inhalation Suspension 0.25 milliGRAM(s) Inhalation every 12 hours  ceFAZolin   IVPB 2000 milliGRAM(s) IV Intermittent every 8 hours  chlorhexidine 2% Cloths 1 Application(s) Topical daily  insulin lispro (ADMELOG) corrective regimen sliding scale   SubCutaneous every 6 hours  lactated ringers. 1000 milliLiter(s) (30 mL/Hr) IV Continuous <Continuous>  metoprolol tartrate Injectable 5 milliGRAM(s) IV Push every 6 hours    MEDICATIONS  (PRN):  HYDROmorphone  Injectable 0.5 milliGRAM(s) IV Push every 4 hours PRN Severe Pain (7 - 10)      PHYSICAL EXAM:  Constitutional: A&Ox3, NAD  Respiratory: Unlabored breathing  Abdomen: Soft, nondistended, NTTP. No rebound or guarding.  Extremities: WWP, CROSS spontaneously    LABS:                        8.7    8.80  )-----------( 161      ( 31 Dec 2022 16:54 )             26.2         138  |  101  |  28<H>  ----------------------------<  160<H>  4.3   |  24  |  0.94    Ca    8.6      31 Dec 2022 00:53  Phos  4.3       Mg     2.2           PT/INR - ( 30 Dec 2022 05:36 )   PT: 13.2 sec;   INR: 1.14 ratio         PTT - ( 30 Dec 2022 05:36 )  PTT:20.2 sec    Urinalysis Basic - ( 30 Dec 2022 12:02 )    Color: Yellow / Appearance: Clear / S.021 / pH: x  Gluc: x / Ketone: Negative  / Bili: Negative / Urobili: 4 mg/dL   Blood: x / Protein: Trace / Nitrite: Negative   Leuk Esterase: Negative / RBC: x / WBC x   Sq Epi: x / Non Sq Epi: x / Bacteria: x      ABO Interpretation: A (22 @ 08:36)      IMAGING:

## 2022-12-31 NOTE — CHART NOTE - NSCHARTNOTEFT_GEN_A_CORE
TRAUMA SERVICE TERTIARY EXAM    Date of TTS: 22                             Time: 18:00  Admit Date: 22                             Trauma Activation: 2  Admit GCS: 15 E- 4    V-5     M-6     HPI:  TRAUMA SURGERY CONSULT NOTE  --------------------------------------------------------------------------------------------    TRAUMA ACTIVATION LEVEL:     MECHANISM OF INJURY:      [X] Blunt  	[] MVC	[X] Fall	[] Pedestrian Struck	[] Motorcycle accident      [] Penetrating  	[] Gun Shot Wound 		[] Stab Wound    GCS: 	E: 4	V: 4	M: 6    Patient is a 87y old  Male who presents with a chief complaint of mechanical fall    HPI:   86yo M h/o dementia, Afib on Xarelto, presenting as transfer from [facility] after mechanical fall. Pt had a ground level fall yesterday, and was transferred here for higher level of care.  Got entra at [facility]    Primary Survey:  A - airway intact  B - bilateral breath sounds and good chest rise  C - initial BP  BP: 134/62 (22 @ 18:32) *** , HR HR: 98 (22 @ 18:32) *** , Afib palpable pulses in all extremities  D - GCS 14 on arrival (confused)  Exposure obtained      Secondary Survey:  GEN: resting comfortably in bed, in NAD  HEENT: normocephalic, non-tender to palpation, no abrasions visible, no step-offs palpated  NECK: trachea midline, non-tender to palpation at posterior midline  CHEST: non-tender to palpation across clavicles and b/l anterior ribs  BACK: non-tender to palpation along cervical, thoracic, lumbar spine midline and b/l posterior ribs; no palpable step-offs or hematomas  ABD: soft, non-distended, non-tender   PELVIS: no pelvic instability  LUE: no visible abrasions or deformities, non-tender to palpation across upper and lower arm  RUE: no visible abrasions or deformities, non-tender to palpation across upper and lower arm  LLE: no visible abrasions or deformities, non-tender to palpation across upper and lower leg. Palpable PT  RLE: no visible abrasions or deformities, non-tender to palpation across upper and lower leg. Palpable PT  NEURO: AAOx0, CN II-IX grossly intact; pupils 3mm, equal and reactive to light bilaterally    ROS:  unable to obtain due to pt mental status     (28 Dec 2022 20:11)      PAST MEDICAL & SURGICAL HISTORY:  HTN (hypertension)      Prostate cancer        Hx of radiation therapy        Cataract of left eye      Borderline diabetes      Kidney cyst, acquired  left      Atrial fibrillation      Cervical disc displacement      Obesity (BMI 30-39.9)      Cataract extraction status of left eye      Larynx polyp  Excision of larynx polyp;         [  ] No significant past history as reviewed with the patient and family    PRIMARY SURVEY:  A - airway intact  B - bilateral equal chest rise, no increased WOB  C - palpable pulses in all extremities;   D - GCS   E - exposure obtained    SECONDARY SURVEY:       TERTIARY SURVEY:   GEN: resting comfortably in bed, in NAD  HEENT: normocephalic, non-tender to palpation, no abrasions visible, no step-offs palpated  NECK: no JVD, non-tender to palpation at posterior midline, no pain with flexion, extension, and bilateral neck rotation  CHEST: non-tender to palpation across clavicles and b/l anterior ribs  BACK: non-tender to palpation along cervical, thoracic, lumbar spine midline and b/l posterior ribs; no palpable step-offs or hematomas  ABD: soft, non-distended, non-tender to palpation in all quadrants without rebound tenderness or guarding  LUE: non-tender to palpation across upper and lower arm, 5/5  strength, fingers warm, well-perfused, full ROM in shoulder, elbow, wrist, and fingers, palpable radial + ulnar pulses  RUE: non-tender to palpation across upper and lower arm, 5/5  strength, fingers warm, well-perfused, full ROM in shoulder, elbow, wrist, and fingers, palpable radial + ulnar pulses  LLE: non-tender to palpation across upper and lower leg; full ROM in hip, knee, ankle, and toes, 5/5 dorsiflexion + plantarflexion, palpable DP + PT pulses; warm, well-perfused  RLE: non-tender to palpation across upper and lower leg; full ROM in hip, knee, ankle, and toes, 5/5 dorsiflexion + plantarflexion, palpable DP + PT pulses; warm, well-perfused  NEURO: AAOx4, no focal neuro deficits; CN II-IX intact     Medications (inpatient): albuterol/ipratropium for Nebulization 3 milliLiter(s) Nebulizer every 4 hours  buDESOnide    Inhalation Suspension 0.25 milliGRAM(s) Inhalation every 12 hours  calcium gluconate IVPB 2 Gram(s) IV Intermittent once  ceFAZolin   IVPB 2000 milliGRAM(s) IV Intermittent every 8 hours  chlorhexidine 2% Cloths 1 Application(s) Topical daily  chlorhexidine 4% Liquid 1 Application(s) Topical daily  folic acid 1 milliGRAM(s) Oral daily  insulin lispro (ADMELOG) corrective regimen sliding scale   SubCutaneous every 6 hours  lactated ringers. 1000 milliLiter(s) IV Continuous <Continuous>  pregabalin 100 milliGRAM(s) Oral every 8 hours  sodium chloride 3%  Inhalation 4 milliLiter(s) Inhalation every 6 hours  tamsulosin 0.4 milliGRAM(s) Oral at bedtime    Medications (PRN):acetaminophen     Tablet .. 650 milliGRAM(s) Oral every 6 hours PRN    Allergies: aspirin (Rash)  (Intolerances: )    Vital Signs Last 24 Hrs  T(C): 37.2 (31 Dec 2022 15:41), Max: 37.2 (31 Dec 2022 15:41)  T(F): 99 (31 Dec 2022 15:41), Max: 99 (31 Dec 2022 15:41)  HR: 91 (31 Dec 2022 17:30) (79 - 122)  BP: 120/66 (31 Dec 2022 17:00) (97/56 - 159/70)  BP(mean): 85 (31 Dec 2022 17:00) (73 - 101)  RR: 17 (31 Dec 2022 17:30) (14 - 22)  SpO2: 100% (31 Dec 2022 17:30) (94% - 100%)    Parameters below as of 31 Dec 2022 15:45  Patient On (Oxygen Delivery Method): nasal cannula  O2 Flow (L/min): 2    Drug Dosing Weight  Height (cm): 167.6 (28 Dec 2022 18:32)  Weight (kg): 86.2 (28 Dec 2022 18:32)  BMI (kg/m2): 30.7 (28 Dec 2022 18:32)  BSA (m2): 1.96 (28 Dec 2022 18:32)                          8.7    8.80  )-----------( 161      ( 31 Dec 2022 16:54 )             26.2         139  |  101  |  28<H>  ----------------------------<  231<H>  4.3   |  24  |  0.91    Ca    8.5      31 Dec 2022 16:54  Phos  4.9       Mg     2.1         TPro  6.6  /  Alb  3.3  /  TBili  0.7  /  DBili  x   /  AST  27  /  ALT  21  /  AlkPhos  73      PT/INR - ( 30 Dec 2022 05:36 )   PT: 13.2 sec;   INR: 1.14 ratio         PTT - ( 30 Dec 2022 05:36 )  PTT:20.2 sec  Urinalysis Basic - ( 30 Dec 2022 12:02 )    Color: Yellow / Appearance: Clear / S.021 / pH: x  Gluc: x / Ketone: Negative  / Bili: Negative / Urobili: 4 mg/dL   Blood: x / Protein: Trace / Nitrite: Negative   Leuk Esterase: Negative / RBC: x / WBC x   Sq Epi: x / Non Sq Epi: x / Bacteria: x        List Operative and Interventional Radiological Procedures:     Consults (Date):  [  ] Neurosurgery   [  ] Orthopedics  [  ] Plastics  [  ] Urology  [  ] PM&R  [  ] Social Work    RADIOLOGICAL FINDINGS REVIEW:  CXR:    Pelvis Films:     C-Spine Films:    T/L/S Spine Films:    Extremity Films:    Head CT:    C-Spine CT:    Neck CT:    Chest CT:    ABD/Pelvis CT:    Other:    ASSESSMENT:   86yo Male with Hx     PLAN:       Known Injuries: TRAUMA SERVICE TERTIARY EXAM    Date of TTS: 22                             Time: 18:00  Admit Date: 22                             Trauma Activation: 2  Admit GCS: 15 E- 4    V-5     M-6     HPI:  TRAUMA SURGERY CONSULT NOTE  --------------------------------------------------------------------------------------------    TRAUMA ACTIVATION LEVEL:     MECHANISM OF INJURY:      [X] Blunt  	[] MVC	[X] Fall	[] Pedestrian Struck	[] Motorcycle accident      [] Penetrating  	[] Gun Shot Wound 		[] Stab Wound    GCS: 	E: 4	V: 4	M: 6    Patient is a 87y old  Male who presents with a chief complaint of mechanical fall    HPI:   86yo M h/o dementia, Afib on Xarelto, presenting as transfer from [facility] after mechanical fall. Pt had a ground level fall yesterday, and was transferred here for higher level of care.  Got Kcentra at [facility]    Primary Survey:  A - airway intact  B - bilateral breath sounds and good chest rise  C - initial BP  BP: 134/62 (22 @ 18:32) *** , HR HR: 98 (22 @ 18:32) *** , Afib palpable pulses in all extremities  D - GCS 14 on arrival (confused)  Exposure obtained      Secondary Survey:  GEN: resting in bed with nebulizer treatment, patient is lethargic at baseline, more so since surgery this morning  HEENT: normocephalic, non-tender to palpation, no abrasions visible, no step-offs palpated  NECK: trachea midline, non-tender to palpation at posterior midline  CHEST: non-tender to palpation across clavicles and b/l anterior ribs  BACK: neurosurgery surgical site w/ dressing in place c/d/i and hemovac w/  ABD: soft, non-distended, non-tender   PELVIS: no pelvic instability  LUE: no visible abrasions or deformities, non-tender to palpation across upper and lower arm  RUE: no visible abrasions or deformities, non-tender to palpation across upper and lower arm  LLE: no visible abrasions or deformities, non-tender to palpation across upper and lower leg. Palpable PT  RLE: no visible abrasions or deformities, non-tender to palpation across upper and lower leg. Palpable PT  NEURO: AAOx0, CN II-IX grossly intact     (28 Dec 2022 20:11)      PAST MEDICAL & SURGICAL HISTORY:  HTN (hypertension)      Prostate cancer        Hx of radiation therapy        Cataract of left eye      Borderline diabetes      Kidney cyst, acquired  left      Atrial fibrillation      Cervical disc displacement      Obesity (BMI 30-39.9)      Cataract extraction status of left eye      Larynx polyp  Excision of larynx polyp;         [  ] No significant past history as reviewed with the patient and family    PRIMARY SURVEY:  A - airway intact  B - bilateral equal chest rise, no increased WOB  C - palpable pulses in all extremities;   D - GCS   E - exposure obtained    SECONDARY SURVEY:   GEN: resting comfortably in bed, in NAD  HEENT: normocephalic, non-tender to palpation, no abrasions visible, no step-offs palpated  NECK: trachea midline, non-tender to palpation at posterior midline  CHEST: non-tender to palpation across clavicles and b/l anterior ribs  BACK: non-tender to palpation along cervical, thoracic, lumbar spine midline and b/l posterior ribs; no palpable step-offs or hematomas  ABD: soft, non-distended, non-tender   PELVIS: no pelvic instability  LUE: no visible abrasions or deformities, non-tender to palpation across upper and lower arm  RUE: no visible abrasions or deformities, non-tender to palpation across upper and lower arm  LLE: no visible abrasions or deformities, non-tender to palpation across upper and lower leg. Palpable PT  RLE: no visible abrasions or deformities, non-tender to palpation across upper and lower leg. Palpable PT  NEURO: AAOx0, CN II-IX grossly intact; pupils 3mm, equal and reactive to light bilaterally    TERTIARY SURVEY: Limited 2/2 patient mental status, language barrier, and lethargy from surgery earlier today. Daughter used for translation.  GEN: resting in bed with nebulizer treatment, patient is lethargic at baseline, more so since surgery this morning  HEENT: normocephalic, non-tender to palpation, no abrasions visible, no step-offs palpated  NECK: trachea midline, non-tender to palpation at posterior midline  CHEST: non-tender to palpation across clavicles and b/l anterior ribs  BACK: neurosurgery surgical site w/ dressing in place c/d/i and hemovac w/ blood tinged output  ABD: soft, non-distended, non-tender   PELVIS: no pelvic instability  LUE: no visible abrasions or deformities, non-tender to palpation across upper and lower arm. weak arm movement, but equal grasp bilaterally  RUE: no visible abrasions or deformities, non-tender to palpation across upper and lower arm. weak arm movement, but equal grasp bilaterally  LLE: no visible abrasions or deformities, non-tender to palpation across upper and lower leg. Palpable PT. Able to move extremity weakly   RLE: no visible abrasions or deformities, non-tender to palpation across upper and lower leg. Palpable PT. Able to move extremity weakly   NEURO: AAOx0, CN II-IX grossly intact      Medications (inpatient): albuterol/ipratropium for Nebulization 3 milliLiter(s) Nebulizer every 4 hours  buDESOnide    Inhalation Suspension 0.25 milliGRAM(s) Inhalation every 12 hours  calcium gluconate IVPB 2 Gram(s) IV Intermittent once  ceFAZolin   IVPB 2000 milliGRAM(s) IV Intermittent every 8 hours  chlorhexidine 2% Cloths 1 Application(s) Topical daily  chlorhexidine 4% Liquid 1 Application(s) Topical daily  folic acid 1 milliGRAM(s) Oral daily  insulin lispro (ADMELOG) corrective regimen sliding scale   SubCutaneous every 6 hours  lactated ringers. 1000 milliLiter(s) IV Continuous <Continuous>  pregabalin 100 milliGRAM(s) Oral every 8 hours  sodium chloride 3%  Inhalation 4 milliLiter(s) Inhalation every 6 hours  tamsulosin 0.4 milliGRAM(s) Oral at bedtime    Medications (PRN):acetaminophen     Tablet .. 650 milliGRAM(s) Oral every 6 hours PRN    Allergies: aspirin (Rash)  (Intolerances: )    Vital Signs Last 24 Hrs  T(C): 37.2 (31 Dec 2022 15:41), Max: 37.2 (31 Dec 2022 15:41)  T(F): 99 (31 Dec 2022 15:41), Max: 99 (31 Dec 2022 15:41)  HR: 91 (31 Dec 2022 17:30) (79 - 122)  BP: 120/66 (31 Dec 2022 17:00) (97/56 - 159/70)  BP(mean): 85 (31 Dec 2022 17:00) (73 - 101)  RR: 17 (31 Dec 2022 17:30) (14 - 22)  SpO2: 100% (31 Dec 2022 17:30) (94% - 100%)    Parameters below as of 31 Dec 2022 15:45  Patient On (Oxygen Delivery Method): nasal cannula  O2 Flow (L/min): 2    Drug Dosing Weight  Height (cm): 167.6 (28 Dec 2022 18:32)  Weight (kg): 86.2 (28 Dec 2022 18:32)  BMI (kg/m2): 30.7 (28 Dec 2022 18:32)  BSA (m2): 1.96 (28 Dec 2022 18:32)                          8.7    8.80  )-----------( 161      ( 31 Dec 2022 16:54 )             26.2         139  |  101  |  28<H>  ----------------------------<  231<H>  4.3   |  24  |  0.91    Ca    8.5      31 Dec 2022 16:54  Phos  4.9       Mg     2.1         TPro  6.6  /  Alb  3.3  /  TBili  0.7  /  DBili  x   /  AST  27  /  ALT  21  /  AlkPhos  73  12-31    PT/INR - ( 30 Dec 2022 05:36 )   PT: 13.2 sec;   INR: 1.14 ratio         PTT - ( 30 Dec 2022 05:36 )  PTT:20.2 sec  Urinalysis Basic - ( 30 Dec 2022 12:02 )    Color: Yellow / Appearance: Clear / S.021 / pH: x  Gluc: x / Ketone: Negative  / Bili: Negative / Urobili: 4 mg/dL   Blood: x / Protein: Trace / Nitrite: Negative   Leuk Esterase: Negative / RBC: x / WBC x   Sq Epi: x / Non Sq Epi: x / Bacteria: x        List Operative and Interventional Radiological Procedures:     Consults (Date):  [X] Neurosurgery ()  [  ] Orthopedics  [  ] Plastics  [  ] Urology  [  ] PM&R  [X] cardiology ()  [X] medicine ()  [X] Pulmonology ()    RADIOLOGICAL FINDINGS REVIEW:  CXR: Right middle and lower lobe haziness, similar to prior.    Pelvis Films: none    C-Spine Films: none    T/L/S Spine Films: none    Extremity Films: none    Head CT: No acute abnormality. Chronic changes noted: ventricles and sulci are prominent, compatible with age-related   generalized cerebral volume loss. periventricular hypoattenuation, likely reflecting chronic microvascular ischemic changes. 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  Advanced degenerative disc disease and spondylosis with loss of disc height and associated degenerative endplate changes. There is narrowing of the LEFT C2-3, BILATERAL C3-4, C4-5, LEFT C5-6 and RIGHT C6-7 neural foramina due to uncovertebral spurring and facet osteophytic hypertrophy. Laminectomies are noted at C3-C5.    Chest/spine CT: Chalk-stick fracture through the L2 vertebral body anteriorly extending into the inferior endplate posteriorly with mild   distraction of fragments. 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  Multilevel degenerative disc disease and   spondylosis. Posterior osteophytic ridge/disc complexes at L1-2 through L5-S1 flatten the ventral thecal sac and narrow the BILATERAL neural   foramina and contributes to mild central stenosis at these levels in conjunction with facet osteophytic hypertrophy.    Abd/Pelvis CT: No fracture.   Large hematoma is seen within expanded LEFT psoas muscle extending into the LEFT iliac is muscle. Stranding in the LEFT retroperitoneum consistent with mild retroperitoneal hemorrhage.    ASSESSMENT:   86yo M h/o dementia, Afib on Xarelto, presenting as transfer from [facility] after mechanical fall, found to have L2 fracture, and small associated L psoas hematoma.    PLAN: Transfer to neurosurgery as primary and transfer to neuro ICU    Known Injuries: L2 fracture, and small associated L psoas hematoma. TRAUMA SERVICE TERTIARY EXAM    Date of TTS: 22                             Time: 18:00  Admit Date: 22                             Trauma Activation: 2  Admit GCS: 15 E- 4    V-5     M-6     HPI:  TRAUMA SURGERY CONSULT NOTE  --------------------------------------------------------------------------------------------    TRAUMA ACTIVATION LEVEL:     MECHANISM OF INJURY:      [X] Blunt  	[] MVC	[X] Fall	[] Pedestrian Struck	[] Motorcycle accident      [] Penetrating  	[] Gun Shot Wound 		[] Stab Wound    GCS: 	E: 4	V: 4	M: 6    Patient is a 87y old  Male who presents with a chief complaint of mechanical fall    HPI:   88yo M h/o dementia, Afib on Xarelto, presenting as transfer from [facility] after mechanical fall. Pt had a ground level fall yesterday, and was transferred here for higher level of care.  Got Kcentra at [facility]    Primary Survey:  A - airway intact  B - bilateral breath sounds and good chest rise  C - initial BP  BP: 134/62 (22 @ 18:32) *** , HR HR: 98 (22 @ 18:32) *** , Afib palpable pulses in all extremities  D - GCS 14 on arrival (confused)  Exposure obtained      Secondary Survey:  GEN: resting in bed with nebulizer treatment, patient is lethargic at baseline, more so since surgery this morning  HEENT: normocephalic, non-tender to palpation, no abrasions visible, no step-offs palpated  NECK: trachea midline, non-tender to palpation at posterior midline  CHEST: non-tender to palpation across clavicles and b/l anterior ribs  BACK: neurosurgery surgical site w/ dressing in place c/d/i and hemovac w/  ABD: soft, non-distended, non-tender   PELVIS: no pelvic instability  LUE: no visible abrasions or deformities, non-tender to palpation across upper and lower arm  RUE: no visible abrasions or deformities, non-tender to palpation across upper and lower arm  LLE: no visible abrasions or deformities, non-tender to palpation across upper and lower leg. Palpable PT  RLE: no visible abrasions or deformities, non-tender to palpation across upper and lower leg. Palpable PT  NEURO: AAOx0, CN II-IX grossly intact     (28 Dec 2022 20:11)      PAST MEDICAL & SURGICAL HISTORY:  HTN (hypertension)      Prostate cancer        Hx of radiation therapy        Cataract of left eye      Borderline diabetes      Kidney cyst, acquired  left      Atrial fibrillation      Cervical disc displacement      Obesity (BMI 30-39.9)      Cataract extraction status of left eye      Larynx polyp  Excision of larynx polyp;         [  ] No significant past history as reviewed with the patient and family    PRIMARY SURVEY:  A - airway intact  B - bilateral equal chest rise, no increased WOB  C - palpable pulses in all extremities;   D - GCS   E - exposure obtained    SECONDARY SURVEY:   GEN: resting comfortably in bed, in NAD  HEENT: normocephalic, non-tender to palpation, no abrasions visible, no step-offs palpated  NECK: trachea midline, non-tender to palpation at posterior midline  CHEST: non-tender to palpation across clavicles and b/l anterior ribs  BACK: non-tender to palpation along cervical, thoracic, lumbar spine midline and b/l posterior ribs; no palpable step-offs or hematomas  ABD: soft, non-distended, non-tender   PELVIS: no pelvic instability  LUE: no visible abrasions or deformities, non-tender to palpation across upper and lower arm  RUE: no visible abrasions or deformities, non-tender to palpation across upper and lower arm  LLE: no visible abrasions or deformities, non-tender to palpation across upper and lower leg. Palpable PT  RLE: no visible abrasions or deformities, non-tender to palpation across upper and lower leg. Palpable PT  NEURO: AAOx0, CN II-IX grossly intact; pupils 3mm, equal and reactive to light bilaterally    TERTIARY SURVEY: Limited 2/2 patient mental status, language barrier, and lethargy from surgery earlier today. Daughter used for translation.  GEN: resting in bed with nebulizer treatment, patient is lethargic at baseline, more so since surgery this morning  HEENT: normocephalic, non-tender to palpation, no abrasions visible, no step-offs palpated  NECK: trachea midline, non-tender to palpation at posterior midline  CHEST: non-tender to palpation across clavicles and b/l anterior ribs  BACK: neurosurgery surgical site w/ dressing in place c/d/i and hemovac w/ blood tinged output  ABD: soft, non-distended, non-tender   PELVIS: no pelvic instability  LUE: no visible abrasions or deformities, non-tender to palpation across upper and lower arm. weak arm movement, but equal grasp bilaterally  RUE: no visible abrasions or deformities, non-tender to palpation across upper and lower arm. weak arm movement, but equal grasp bilaterally  LLE: no visible abrasions or deformities, non-tender to palpation across upper and lower leg. Palpable PT. Able to move extremity weakly   RLE: no visible abrasions or deformities, non-tender to palpation across upper and lower leg. Palpable PT. Able to move extremity weakly   NEURO: AAOx0, CN II-IX grossly intact      Medications (inpatient): albuterol/ipratropium for Nebulization 3 milliLiter(s) Nebulizer every 4 hours  buDESOnide    Inhalation Suspension 0.25 milliGRAM(s) Inhalation every 12 hours  calcium gluconate IVPB 2 Gram(s) IV Intermittent once  ceFAZolin   IVPB 2000 milliGRAM(s) IV Intermittent every 8 hours  chlorhexidine 2% Cloths 1 Application(s) Topical daily  chlorhexidine 4% Liquid 1 Application(s) Topical daily  folic acid 1 milliGRAM(s) Oral daily  insulin lispro (ADMELOG) corrective regimen sliding scale   SubCutaneous every 6 hours  lactated ringers. 1000 milliLiter(s) IV Continuous <Continuous>  pregabalin 100 milliGRAM(s) Oral every 8 hours  sodium chloride 3%  Inhalation 4 milliLiter(s) Inhalation every 6 hours  tamsulosin 0.4 milliGRAM(s) Oral at bedtime    Medications (PRN):acetaminophen     Tablet .. 650 milliGRAM(s) Oral every 6 hours PRN    Allergies: aspirin (Rash)  (Intolerances: )    Vital Signs Last 24 Hrs  T(C): 37.2 (31 Dec 2022 15:41), Max: 37.2 (31 Dec 2022 15:41)  T(F): 99 (31 Dec 2022 15:41), Max: 99 (31 Dec 2022 15:41)  HR: 91 (31 Dec 2022 17:30) (79 - 122)  BP: 120/66 (31 Dec 2022 17:00) (97/56 - 159/70)  BP(mean): 85 (31 Dec 2022 17:00) (73 - 101)  RR: 17 (31 Dec 2022 17:30) (14 - 22)  SpO2: 100% (31 Dec 2022 17:30) (94% - 100%)    Parameters below as of 31 Dec 2022 15:45  Patient On (Oxygen Delivery Method): nasal cannula  O2 Flow (L/min): 2    Drug Dosing Weight  Height (cm): 167.6 (28 Dec 2022 18:32)  Weight (kg): 86.2 (28 Dec 2022 18:32)  BMI (kg/m2): 30.7 (28 Dec 2022 18:32)  BSA (m2): 1.96 (28 Dec 2022 18:32)                          8.7    8.80  )-----------( 161      ( 31 Dec 2022 16:54 )             26.2         139  |  101  |  28<H>  ----------------------------<  231<H>  4.3   |  24  |  0.91    Ca    8.5      31 Dec 2022 16:54  Phos  4.9       Mg     2.1         TPro  6.6  /  Alb  3.3  /  TBili  0.7  /  DBili  x   /  AST  27  /  ALT  21  /  AlkPhos  73  12-31    PT/INR - ( 30 Dec 2022 05:36 )   PT: 13.2 sec;   INR: 1.14 ratio         PTT - ( 30 Dec 2022 05:36 )  PTT:20.2 sec  Urinalysis Basic - ( 30 Dec 2022 12:02 )    Color: Yellow / Appearance: Clear / S.021 / pH: x  Gluc: x / Ketone: Negative  / Bili: Negative / Urobili: 4 mg/dL   Blood: x / Protein: Trace / Nitrite: Negative   Leuk Esterase: Negative / RBC: x / WBC x   Sq Epi: x / Non Sq Epi: x / Bacteria: x        List Operative and Interventional Radiological Procedures:     Consults (Date):  [X] Neurosurgery ()  [  ] Orthopedics  [  ] Plastics  [  ] Urology  [  ] PM&R  [X] cardiology ()  [X] medicine ()  [X] Pulmonology ()    RADIOLOGICAL FINDINGS REVIEW:  CXR: Right middle and lower lobe haziness, similar to prior.    Pelvis Films: none    C-Spine Films: none    T/L/S Spine Films: none    Extremity Films: none    Head CT: No acute abnormality. Chronic changes noted: ventricles and sulci are prominent, compatible with age-related   generalized cerebral volume loss. periventricular hypoattenuation, likely reflecting chronic microvascular ischemic changes. 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  Advanced degenerative disc disease and spondylosis with loss of disc height and associated degenerative endplate changes. There is narrowing of the LEFT C2-3, BILATERAL C3-4, C4-5, LEFT C5-6 and RIGHT C6-7 neural foramina due to uncovertebral spurring and facet osteophytic hypertrophy. Laminectomies are noted at C3-C5.    Chest/spine CT: Chalk-stick fracture through the L2 vertebral body anteriorly extending into the inferior endplate posteriorly with mild distraction of fragments. 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  Multilevel degenerative disc disease and spondylosis. Posterior osteophytic ridge/disc complexes at L1-2 through L5-S1 flatten the ventral thecal sac and narrow the BILATERAL neural foramina and contributes to mild central stenosis at these levels in conjunction with facet osteophytic hypertrophy.    Abd/Pelvis CT: No fracture.   Large hematoma is seen within expanded LEFT psoas muscle extending into the LEFT iliac is muscle. Stranding in the LEFT retroperitoneum consistent with mild retroperitoneal hemorrhage.    ASSESSMENT:   88yo M h/o dementia, Afib on Xarelto, presenting as transfer from [facility] after mechanical fall, found to have L2 fracture, and small associated L psoas hematoma.    PLAN: Transfer to neurosurgery as primary and transfer to neuro ICU    Known Injuries: L2 fracture, and small associated L psoas hematoma.

## 2022-12-31 NOTE — PROGRESS NOTE ADULT - ASSESSMENT
88yo M h/o dementia, Afib on Xarelto, BPH, HTN, HLD, DM2 presenting as transfer from [facility] after mechanical fall. Pt found to have L2 fracture, and small associated L psoas hematoma.    SOB, Wheezing possibly related to pulm edema 2/2 Afib rvr / reactive airway dz  -per family pt has chronic wheezing (not as bad as current hospitalization), no history of  copd or asthma;  Suspect underlying reactive airway dz  -cxr noted, pro bnp elevated,  -no history of smoking, copd or asthma  -afib controlled  -tte normal lvef no wma; mild valvular dz  -s/p lasix   -C/w nebs and Pulmicort per SICU   -being transferred to SICU     Compression fracture of L2.   Pt found to have L2 fracture  Bedrest and spine precautions until MRI done and reviewed by nsgy  Hold raven   Neurosurgery follow up.  Management per NSGY    Preoperative clearance.   EKG no st/t changes   RCRI is calculated to be 1 (6.0%) and his Clay risk score is 1.0%, making him elevated risk for his indicated spinal surgery. His METS are <4.   Cardiology following     Hematoma.   Found to have large hematoma within expanded left psoas muscle extending into the left iliac is muscle.   Trend h/h   AC on hold.    Dementia.   -C/w aricept 10mg and namenda 5mg bid.    Hypertension.   C/w lisinopril 5mg.    Chronic atrial fibrillation.   Hold xarelto prior to surgery.    Diabetes mellitus.   - A1C 6.8   C/w SS  Takes metformin 500mg at home   DM well controlled, PMD follow up for medicaiton optimization, would not discharge on metformin.    Hyperlipemia.   Simvastatin 10mg.     PCP Dr. Shasta Victor  [phone]  Home meds: donepezil 10mg, sertraline 25mg, metformin 500mg qd, xaretlo 20mg, memantine 5mg bid, flomax 0.4mg, lisinopril 5mg, simvastatin 10mg.

## 2022-12-31 NOTE — CHART NOTE - NSCHARTNOTEFT_GEN_A_CORE
Incidental findings are findings on history, physical examination, lab work, and imaging that are not directly related to the patient's chief complaint and cause for hospital admission.     1. The incidental findings for this patient are (please list):   generalized cerebral volume loss. periventricular hypoattenuation, likely reflecting chronic microvascular ischemic changes. 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  Multilevel degenerative disc disease and spondylosis    2. Were these findings explained to the patient and/or their family (in the event that either the patient requests communication with their family or the patient is unable to understand or remember the findings and plan)? Yes      3. Was a copy of the lab work/ imaging report given to the patient and/or their family? Yes    4. Was the patient asked whether they can follow up with their PMD regarding this finding? If the patient says no, or does not have a PMD, you must identify a provider (i.e. Medicine Clinic or specialist) with whom the patient will follow up. Yes    5. Was the finding documented on the discharge summary? No

## 2022-12-31 NOTE — PROGRESS NOTE ADULT - SUBJECTIVE AND OBJECTIVE BOX
Patient is a 87y old  Male who presents with a chief complaint of Transfer level 2 trauma (31 Dec 2022 01:13)      SUBJECTIVE / OVERNIGHT EVENTS:  Patient seen and examined.   To go for surgery today.      Vital Signs Last 24 Hrs  T(C): 36.6 (31 Dec 2022 11:00), Max: 36.8 (30 Dec 2022 19:00)  T(F): 97.8 (31 Dec 2022 11:00), Max: 98.3 (30 Dec 2022 19:00)  HR: 117 (31 Dec 2022 11:00) (79 - 117)  BP: 120/55 (31 Dec 2022 11:00) (97/56 - 159/70)  BP(mean): 79 (31 Dec 2022 11:00) (73 - 101)  RR: 16 (31 Dec 2022 11:00) (14 - 22)  SpO2: 100% (31 Dec 2022 11:00) (94% - 100%)    Parameters below as of 31 Dec 2022 09:47  Patient On (Oxygen Delivery Method): room air      I&O's Summary    30 Dec 2022 07:01  -  31 Dec 2022 07:00  --------------------------------------------------------  IN: 870 mL / OUT: 1785 mL / NET: -915 mL    31 Dec 2022 07:01  -  31 Dec 2022 15:17  --------------------------------------------------------  IN: 60 mL / OUT: 850 mL / NET: -790 mL        PHYSICAL EXAM:  GENERAL: NAD, AAOx1  HEAD:  Atraumatic, Normocephalic  EYES: EOMI; conjunctiva and sclera clear  NECK: Supple, No JVD, No LAD  CHEST/LUNG: wheezing bilaterally   HEART: Regular rate and rhythm; No murmurs, rubs, or gallops  ABDOMEN: Soft, Nontender, Nondistended; Bowel sounds present  EXTREMITIES:  2+ Peripheral Pulses, No clubbing, cyanosis, or edema  SKIN: No rashes or lesions    LABS:                        9.0    8.86  )-----------( 151      ( 31 Dec 2022 00:53 )             27.4         138  |  101  |  28<H>  ----------------------------<  160<H>  4.3   |  24  |  0.94    Ca    8.6      31 Dec 2022 00:53  Phos  4.3       Mg     2.2           PT/INR - ( 30 Dec 2022 05:36 )   PT: 13.2 sec;   INR: 1.14 ratio         PTT - ( 30 Dec 2022 05:36 )  PTT:20.2 sec  CAPILLARY BLOOD GLUCOSE      POCT Blood Glucose.: 124 mg/dL (31 Dec 2022 06:33)  POCT Blood Glucose.: 137 mg/dL (31 Dec 2022 03:29)  POCT Blood Glucose.: 282 mg/dL (31 Dec 2022 00:31)  POCT Blood Glucose.: 228 mg/dL (30 Dec 2022 17:16)        Urinalysis Basic - ( 30 Dec 2022 12:02 )    Color: Yellow / Appearance: Clear / S.021 / pH: x  Gluc: x / Ketone: Negative  / Bili: Negative / Urobili: 4 mg/dL   Blood: x / Protein: Trace / Nitrite: Negative   Leuk Esterase: Negative / RBC: x / WBC x   Sq Epi: x / Non Sq Epi: x / Bacteria: x        RADIOLOGY & ADDITIONAL TESTS:    Imaging Personally Reviewed:  [x] YES  [ ] NO    Consultant(s) Notes Reviewed:  [x] YES  [ ] NO      MEDICATIONS  (STANDING):    MEDICATIONS  (PRN):      Care Discussed with Consultants/Other Providers [x] YES  [ ] NO    HEALTH ISSUES - PROBLEM Dx:  Compression fracture of L2    Hematoma    Dementia    Hypertension    Chronic atrial fibrillation    Diabetes mellitus    Hyperlipemia    Medication management    Preoperative clearance

## 2022-12-31 NOTE — PROGRESS NOTE ADULT - ASSESSMENT
88yo M h/o dementia, Afib on Xarelto, presenting as transfer from [facility] after mechanical fall. Pt had a ground level fall yesterday, and was transferred here for higher level of care.  Got Kcentra at [facility].  Found to have L2 spine fracture, RP hematoma.  Planned for OR but due to wheezing, SOB, worsening resp status, OR was deferred and SICU consulted for management of respiratory distress.       Neuro:  - A &O  - Multimodal pain control w/ tylenol and dilaudid    Resp:  - Out of bed to chair, ambulate as tolerated, and incentive spirometry to prevent atelectasis  - pulmicort, harshbs q4h  -received 1 dose of 40mg lasix  -CXR   -pulmonology consulted: optimized    CVS: Shock 2/2 hx of   - Will wean vasopressor support w/ goal MAP > 65 mmHg    GI:   - npo after midnight  - Bowel regimen with senna & Miralax      Renal:  - Monitor I&Os and electrolytes  - replete electrolytes as needed     Heme:  - Monitor CBC and coags  -  VTE prophylaxis: Lovenox SC  - SCD's    ID:  - Monitor WBC, temperature  - Antibiotcis: none      Endo:   - Monitor glucose of bmp  - HgbA1C 6.8

## 2022-12-31 NOTE — CHART NOTE - NSCHARTNOTEFT_GEN_A_CORE
Pt transferred to neurosurgery as primary after their surgery this morning. No intervention from trauma surgery necessary. Please reach out with any further questions or concerns.    ACS/Trauma  [id number]

## 2022-12-31 NOTE — BRIEF OPERATIVE NOTE - NSICDXBRIEFPROCEDURE_GEN_ALL_CORE_FT
PROCEDURES:  Fusion, spine, thoracolumbar, posterior approach 31-Dec-2022 15:25:59  Luis Alberto Christopher

## 2022-12-31 NOTE — PROGRESS NOTE ADULT - ASSESSMENT
ASSESSMENT: HPI:  TRAUMA SURGERY CONSULT NOTE  --------------------------------------------------------------------------------------------    TRAUMA ACTIVATION LEVEL:     MECHANISM OF INJURY:      [X] Blunt  	[] MVC	[X] Fall	[] Pedestrian Struck	[] Motorcycle accident      [] Penetrating  	[] Gun Shot Wound 		[] Stab Wound    GCS: 	E: 4	V: 4	M: 6    Patient is a 87y old  Male who presents with a chief complaint of mechanical fall    HPI:   88yo M h/o dementia, Afib on Xarel, presenting as transfer from [facility] after mechanical fall. Pt had a ground level fall yesterday, and was transferred here for higher level of care.  Got Kcentra at [facility]    Primary Survey:  A - airway intact  B - bilateral breath sounds and good chest rise  C - initial BP  BP: 134/62 (12-28-22 @ 18:32) *** , HR HR: 98 (12-28-22 @ 18:32) *** , Afib palpable pulses in all extremities  D - GCS 14 on arrival (confused)  Exposure obtained      Secondary Survey:  GEN: resting comfortably in bed, in NAD  HEENT: normocephalic, non-tender to palpation, no abrasions visible, no step-offs palpated  NECK: trachea midline, non-tender to palpation at posterior midline  CHEST: non-tender to palpation across clavicles and b/l anterior ribs  BACK: non-tender to palpation along cervical, thoracic, lumbar spine midline and b/l posterior ribs; no palpable step-offs or hematomas  ABD: soft, non-distended, non-tender   PELVIS: no pelvic instability  LUE: no visible abrasions or deformities, non-tender to palpation across upper and lower arm  RUE: no visible abrasions or deformities, non-tender to palpation across upper and lower arm  LLE: no visible abrasions or deformities, non-tender to palpation across upper and lower leg. Palpable PT  RLE: no visible abrasions or deformities, non-tender to palpation across upper and lower leg. Palpable PT  NEURO: AAOx0, CN II-IX grossly intact; pupils 3mm, equal and reactive to light bilaterally    ROS:  unable to obtain due to pt mental status     (28 Dec 2022 20:11)      NEURO:  CT Head in AM, MRI post-op, Surgical drains per NSGY, Pain control  Activity: [] OOB as tolerated [] Bedrest [] PT [] OT [] PMNR    PULM:  Incentive spirometry, mobilize as tolerated    CV:  Keep -150mmHg, d/c a-line in AM    RENAL:  IVF until good PO intake, d/c wilson in AM    GI:  Diet: Dysphagia screen and then advance diet as tolerated  GI prophylaxis [] not indicated [] PPI [] other:  Bowel regimen [] colace [] senna [] other:    ENDO:   Goal euglycemia (-180)    HEME/ONC:  VTE prophylaxis: [] SCDs [] chemoprophylaxis [] hold chemoprophylaxis due to: [] high risk of DVT/PE on admission due to:    ID:  Milena-op antibiotics    MISC:    SOCIAL/FAMILY:  [] awaiting [] updated at bedside [] family meeting    CODE STATUS:  [] Full Code [] DNR [] DNI [] Palliative/Comfort Care    DISPOSITION:  [] ICU [] Stroke Unit [] Floor [] EMU [] RCU [] PCU    [] Patient is at high risk of neurologic deterioration/death due to:     Time seen:  Time spent: ___ [] critical care minutes    Contact: [phone] ASSESSMENT:     NEURO:  [id number]   Surgical drains per NSGY, Pain control  cont megan, aricept  Activity: [] OOB as tolerated [] Bedrest [x] PT [x] OT [] PMNR    PULM: cont duonebs  Incentive spirometry, mobilize as tolerated    CV: resume xarelto when ok w nsgy for afib  Keep -160mmHg  cont bb    RENAL: cont flomax  IVF until good PO intake, d/c wilson in AM if ok w nsgy    GI:  Diet: Dysphagia screen and then advance diet as tolerated  GI prophylaxis [x] not indicated [] PPI [] other:  Bowel regimen     ENDO: ISS  cont statin  a1c 6.8  Goal euglycemia (-180)    HEME/ONC:  VTE prophylaxis: [x] SCDs [] chemoprophylaxis [] hold chemoprophylaxis due to: [] high risk of DVT/PE on admission due to:    ID:  Milena-op antibiotics ancef    MISC:    SOCIAL/FAMILY:  [] awaiting [] updated at bedside [] family meeting    CODE STATUS:  [] Full Code [] DNR [] DNI [] Palliative/Comfort Care    DISPOSITION:  [] ICU [] Stroke Unit [] Floor [] EMU [] RCU [] PCU    [] Patient is at high risk of neurologic deterioration/death due to:     Time seen:  Time spent: ___ [] critical care minutes    Contact: [phone] ASSESSMENT:     NEURO:  [email or web address]   Surgical drains per NSGY, Pain control  cont megan, aricept  Activity: [] OOB as tolerated [] Bedrest [x] PT [x] OT [] PMNR    PULM: cont duonebs  Incentive spirometry, mobilize as tolerated    CV: resume xarelto when ok w nsgy for afib  Keep -160mmHg  cont bb  ef 62% on 12/30    RENAL: cont flomax  IVF until good PO intake, d/c wilson in AM if ok w nsgy    GI:  Diet: Dysphagia screen and then advance diet as tolerated  GI prophylaxis [x] not indicated [] PPI [] other:  Bowel regimen     ENDO: ISS  cont statin  a1c 6.8  Goal euglycemia (-180)    HEME/ONC:  VTE prophylaxis: [x] SCDs [] chemoprophylaxis [] hold chemoprophylaxis due to: [] high risk of DVT/PE on admission due to:    ID:  Milena-op antibiotics ancef    MISC:    SOCIAL/FAMILY:  [] awaiting [] updated at bedside [] family meeting    CODE STATUS:  [] Full Code [] DNR [] DNI [] Palliative/Comfort Care    DISPOSITION:  [] ICU [] Stroke Unit [] Floor [] EMU [] RCU [] PCU    [] Patient is at high risk of neurologic deterioration/death due to:     Time seen:  Time spent: ___ [] critical care minutes    Contact: [phone] ASSESSMENT:     NEURO:  [email or web address]   Surgical drains per NSGY, Pain control  cont megan, aricept  Activity: [] OOB as tolerated [] Bedrest [x] PT [x] OT [] PMNR    PULM: cont duonebs  Incentive spirometry, mobilize as tolerated    CV: resume xarelto when ok w nsgy for afib  Keep -160mmHg  cont bb  ef 62% on 12/30    RENAL: cont flomax  IVF until good PO intake, d/c wilson in AM if ok w nsgy    GI:  Diet: Dysphagia screen and then advance diet as tolerated  GI prophylaxis [x] not indicated [] PPI [] other:  Bowel regimen     ENDO: ISS  cont statin  a1c 6.8  Goal euglycemia (-180)    HEME/ONC:  VTE prophylaxis: [x] SCDs [] chemoprophylaxis [] hold chemoprophylaxis due to: [] high risk of DVT/PE on admission due to:    ID:  Milena-op antibiotics ancef    MISC:    SOCIAL/FAMILY:  [] awaiting [x] updated wife zuleyma and daughter/granddaughter at bedside [] family meeting    CODE STATUS:  [x] Full Code [] DNR [] DNI [] Palliative/Comfort Care    DISPOSITION:  [x] ICU [] Stroke Unit [] Floor [] EMU [] RCU [] PCU        Contact: [phone]

## 2023-01-01 LAB
ANION GAP SERPL CALC-SCNC: 10 MMOL/L — SIGNIFICANT CHANGE UP (ref 5–17)
ANION GAP SERPL CALC-SCNC: 11 MMOL/L — SIGNIFICANT CHANGE UP (ref 5–17)
BUN SERPL-MCNC: 39 MG/DL — HIGH (ref 7–23)
BUN SERPL-MCNC: 42 MG/DL — HIGH (ref 7–23)
CALCIUM SERPL-MCNC: 8.5 MG/DL — SIGNIFICANT CHANGE UP (ref 8.4–10.5)
CALCIUM SERPL-MCNC: 8.6 MG/DL — SIGNIFICANT CHANGE UP (ref 8.4–10.5)
CHLORIDE SERPL-SCNC: 101 MMOL/L — SIGNIFICANT CHANGE UP (ref 96–108)
CHLORIDE SERPL-SCNC: 101 MMOL/L — SIGNIFICANT CHANGE UP (ref 96–108)
CO2 SERPL-SCNC: 24 MMOL/L — SIGNIFICANT CHANGE UP (ref 22–31)
CO2 SERPL-SCNC: 25 MMOL/L — SIGNIFICANT CHANGE UP (ref 22–31)
CREAT SERPL-MCNC: 0.97 MG/DL — SIGNIFICANT CHANGE UP (ref 0.5–1.3)
CREAT SERPL-MCNC: 0.99 MG/DL — SIGNIFICANT CHANGE UP (ref 0.5–1.3)
EGFR: 74 ML/MIN/1.73M2 — SIGNIFICANT CHANGE UP
EGFR: 76 ML/MIN/1.73M2 — SIGNIFICANT CHANGE UP
GLUCOSE BLDC GLUCOMTR-MCNC: 155 MG/DL — HIGH (ref 70–99)
GLUCOSE BLDC GLUCOMTR-MCNC: 181 MG/DL — HIGH (ref 70–99)
GLUCOSE BLDC GLUCOMTR-MCNC: 199 MG/DL — HIGH (ref 70–99)
GLUCOSE BLDC GLUCOMTR-MCNC: 221 MG/DL — HIGH (ref 70–99)
GLUCOSE SERPL-MCNC: 156 MG/DL — HIGH (ref 70–99)
GLUCOSE SERPL-MCNC: 176 MG/DL — HIGH (ref 70–99)
HCT VFR BLD CALC: 21.8 % — LOW (ref 39–50)
HCT VFR BLD CALC: 28.9 % — LOW (ref 39–50)
HCT VFR BLD CALC: 29.5 % — LOW (ref 39–50)
HGB BLD-MCNC: 7.2 G/DL — LOW (ref 13–17)
HGB BLD-MCNC: 9.6 G/DL — LOW (ref 13–17)
HGB BLD-MCNC: 9.6 G/DL — LOW (ref 13–17)
MAGNESIUM SERPL-MCNC: 2 MG/DL — SIGNIFICANT CHANGE UP (ref 1.6–2.6)
MAGNESIUM SERPL-MCNC: 2.2 MG/DL — SIGNIFICANT CHANGE UP (ref 1.6–2.6)
MCHC RBC-ENTMCNC: 29.8 PG — SIGNIFICANT CHANGE UP (ref 27–34)
MCHC RBC-ENTMCNC: 30.2 PG — SIGNIFICANT CHANGE UP (ref 27–34)
MCHC RBC-ENTMCNC: 30.6 PG — SIGNIFICANT CHANGE UP (ref 27–34)
MCHC RBC-ENTMCNC: 32.5 GM/DL — SIGNIFICANT CHANGE UP (ref 32–36)
MCHC RBC-ENTMCNC: 33 GM/DL — SIGNIFICANT CHANGE UP (ref 32–36)
MCHC RBC-ENTMCNC: 33.2 GM/DL — SIGNIFICANT CHANGE UP (ref 32–36)
MCV RBC AUTO: 90.9 FL — SIGNIFICANT CHANGE UP (ref 80–100)
MCV RBC AUTO: 91.6 FL — SIGNIFICANT CHANGE UP (ref 80–100)
MCV RBC AUTO: 92.8 FL — SIGNIFICANT CHANGE UP (ref 80–100)
NRBC # BLD: 0 /100 WBCS — SIGNIFICANT CHANGE UP (ref 0–0)
PHOSPHATE SERPL-MCNC: 2.3 MG/DL — LOW (ref 2.5–4.5)
PHOSPHATE SERPL-MCNC: 2.9 MG/DL — SIGNIFICANT CHANGE UP (ref 2.5–4.5)
PLATELET # BLD AUTO: 141 K/UL — LOW (ref 150–400)
PLATELET # BLD AUTO: 165 K/UL — SIGNIFICANT CHANGE UP (ref 150–400)
PLATELET # BLD AUTO: 203 K/UL — SIGNIFICANT CHANGE UP (ref 150–400)
POTASSIUM SERPL-MCNC: 4 MMOL/L — SIGNIFICANT CHANGE UP (ref 3.5–5.3)
POTASSIUM SERPL-MCNC: 4.5 MMOL/L — SIGNIFICANT CHANGE UP (ref 3.5–5.3)
POTASSIUM SERPL-SCNC: 4 MMOL/L — SIGNIFICANT CHANGE UP (ref 3.5–5.3)
POTASSIUM SERPL-SCNC: 4.5 MMOL/L — SIGNIFICANT CHANGE UP (ref 3.5–5.3)
RBC # BLD: 2.35 M/UL — LOW (ref 4.2–5.8)
RBC # BLD: 3.18 M/UL — LOW (ref 4.2–5.8)
RBC # BLD: 3.22 M/UL — LOW (ref 4.2–5.8)
RBC # FLD: 13.5 % — SIGNIFICANT CHANGE UP (ref 10.3–14.5)
RBC # FLD: 14.4 % — SIGNIFICANT CHANGE UP (ref 10.3–14.5)
RBC # FLD: 15.4 % — HIGH (ref 10.3–14.5)
SODIUM SERPL-SCNC: 135 MMOL/L — SIGNIFICANT CHANGE UP (ref 135–145)
SODIUM SERPL-SCNC: 137 MMOL/L — SIGNIFICANT CHANGE UP (ref 135–145)
TROPONIN T, HIGH SENSITIVITY RESULT: 34 NG/L — SIGNIFICANT CHANGE UP (ref 0–51)
WBC # BLD: 11.12 K/UL — HIGH (ref 3.8–10.5)
WBC # BLD: 14.53 K/UL — HIGH (ref 3.8–10.5)
WBC # BLD: 9.35 K/UL — SIGNIFICANT CHANGE UP (ref 3.8–10.5)
WBC # FLD AUTO: 11.12 K/UL — HIGH (ref 3.8–10.5)
WBC # FLD AUTO: 14.53 K/UL — HIGH (ref 3.8–10.5)
WBC # FLD AUTO: 9.35 K/UL — SIGNIFICANT CHANGE UP (ref 3.8–10.5)

## 2023-01-01 PROCEDURE — 99291 CRITICAL CARE FIRST HOUR: CPT

## 2023-01-01 PROCEDURE — 93010 ELECTROCARDIOGRAM REPORT: CPT

## 2023-01-01 PROCEDURE — 70450 CT HEAD/BRAIN W/O DYE: CPT | Mod: 26

## 2023-01-01 RX ORDER — SODIUM,POTASSIUM PHOSPHATES 278-250MG
1 POWDER IN PACKET (EA) ORAL ONCE
Refills: 0 | Status: COMPLETED | OUTPATIENT
Start: 2023-01-01 | End: 2023-01-01

## 2023-01-01 RX ORDER — PHENYLEPHRINE HYDROCHLORIDE 10 MG/ML
0.2 INJECTION INTRAVENOUS
Qty: 40 | Refills: 0 | Status: DISCONTINUED | OUTPATIENT
Start: 2023-01-01 | End: 2023-01-02

## 2023-01-01 RX ORDER — TRAMADOL HYDROCHLORIDE 50 MG/1
25 TABLET ORAL EVERY 4 HOURS
Refills: 0 | Status: DISCONTINUED | OUTPATIENT
Start: 2023-01-01 | End: 2023-01-02

## 2023-01-01 RX ORDER — SODIUM CHLORIDE 9 MG/ML
500 INJECTION INTRAMUSCULAR; INTRAVENOUS; SUBCUTANEOUS ONCE
Refills: 0 | Status: COMPLETED | OUTPATIENT
Start: 2023-01-01 | End: 2023-01-01

## 2023-01-01 RX ORDER — ACETAMINOPHEN 500 MG
1000 TABLET ORAL ONCE
Refills: 0 | Status: COMPLETED | OUTPATIENT
Start: 2023-01-01 | End: 2023-01-01

## 2023-01-01 RX ORDER — METOPROLOL TARTRATE 50 MG
2.5 TABLET ORAL ONCE
Refills: 0 | Status: COMPLETED | OUTPATIENT
Start: 2023-01-01 | End: 2023-01-01

## 2023-01-01 RX ADMIN — CHLORHEXIDINE GLUCONATE 1 APPLICATION(S): 213 SOLUTION TOPICAL at 09:00

## 2023-01-01 RX ADMIN — Medication 3 MILLILITER(S): at 09:48

## 2023-01-01 RX ADMIN — Medication 4: at 06:35

## 2023-01-01 RX ADMIN — Medication 2: at 12:41

## 2023-01-01 RX ADMIN — Medication 3 MILLILITER(S): at 01:29

## 2023-01-01 RX ADMIN — Medication 100 MILLIGRAM(S): at 06:35

## 2023-01-01 RX ADMIN — Medication 0.25 MILLIGRAM(S): at 17:25

## 2023-01-01 RX ADMIN — ENOXAPARIN SODIUM 40 MILLIGRAM(S): 100 INJECTION SUBCUTANEOUS at 17:28

## 2023-01-01 RX ADMIN — Medication 400 MILLIGRAM(S): at 22:35

## 2023-01-01 RX ADMIN — Medication 1000 MILLIGRAM(S): at 09:00

## 2023-01-01 RX ADMIN — Medication 2: at 17:26

## 2023-01-01 RX ADMIN — Medication 2.5 MILLIGRAM(S): at 08:02

## 2023-01-01 RX ADMIN — Medication 100 MILLIGRAM(S): at 14:46

## 2023-01-01 RX ADMIN — Medication 25 MILLIGRAM(S): at 06:35

## 2023-01-01 RX ADMIN — TRAMADOL HYDROCHLORIDE 25 MILLIGRAM(S): 50 TABLET ORAL at 16:45

## 2023-01-01 RX ADMIN — SODIUM CHLORIDE 4 MILLILITER(S): 9 INJECTION INTRAMUSCULAR; INTRAVENOUS; SUBCUTANEOUS at 06:22

## 2023-01-01 RX ADMIN — Medication 1 MILLIGRAM(S): at 12:48

## 2023-01-01 RX ADMIN — Medication 100 MILLIGRAM(S): at 04:25

## 2023-01-01 RX ADMIN — Medication 1 PACKET(S): at 22:36

## 2023-01-01 RX ADMIN — Medication 100 MILLIGRAM(S): at 22:36

## 2023-01-01 RX ADMIN — Medication 3 MILLILITER(S): at 15:08

## 2023-01-01 RX ADMIN — Medication 0.25 MILLIGRAM(S): at 06:22

## 2023-01-01 RX ADMIN — TAMSULOSIN HYDROCHLORIDE 0.4 MILLIGRAM(S): 0.4 CAPSULE ORAL at 22:36

## 2023-01-01 RX ADMIN — SODIUM CHLORIDE 1000 MILLILITER(S): 9 INJECTION INTRAMUSCULAR; INTRAVENOUS; SUBCUTANEOUS at 09:57

## 2023-01-01 RX ADMIN — Medication 25 MILLIGRAM(S): at 16:30

## 2023-01-01 RX ADMIN — Medication 2: at 22:50

## 2023-01-01 RX ADMIN — Medication 1000 MILLIGRAM(S): at 23:00

## 2023-01-01 RX ADMIN — Medication 3 MILLILITER(S): at 06:22

## 2023-01-01 RX ADMIN — TRAMADOL HYDROCHLORIDE 25 MILLIGRAM(S): 50 TABLET ORAL at 16:13

## 2023-01-01 RX ADMIN — Medication 400 MILLIGRAM(S): at 08:29

## 2023-01-01 RX ADMIN — Medication 3 MILLILITER(S): at 17:25

## 2023-01-01 RX ADMIN — SODIUM CHLORIDE 4 MILLILITER(S): 9 INJECTION INTRAMUSCULAR; INTRAVENOUS; SUBCUTANEOUS at 17:25

## 2023-01-01 RX ADMIN — Medication 3 MILLILITER(S): at 21:56

## 2023-01-01 NOTE — SWALLOW BEDSIDE ASSESSMENT ADULT - H & P REVIEW
88yo M h/o dementia, Afib on Xarelto, presenting as transfer from [facility] after mechanical fall, found to have L2 fracture, and small associated L psoas hematoma./yes

## 2023-01-01 NOTE — SWALLOW BEDSIDE ASSESSMENT ADULT - ORAL PHASE
Delayed oral transit time suspect uncontrolled loss suspect uncontrolled loss/Delayed oral transit time

## 2023-01-01 NOTE — OCCUPATIONAL THERAPY INITIAL EVALUATION ADULT - PERTINENT HX OF CURRENT PROBLEM, REHAB EVAL
88yo M h/o dementia, Afib on Xarelto, presenting as transfer from [facility] after mechanical fall. Pt had a ground level fall yesterday, and was transferred here for higher level of care.  XRay spine 12/31: Ultimately implanted L1-L4 level posterior spinal fusion hardware   consisting of pedicle screws with interconnecting rods. Redemonstrated distracted inferior L2 fracture as on MRI study from 12/29/2022.Generalized osteopenia and advanced multilevel degenerative disc changes also apparent.

## 2023-01-01 NOTE — SWALLOW BEDSIDE ASSESSMENT ADULT - ASR SWALLOW ASPIRATION MONITOR
Monitor for s/s aspiration/laryngeal penetration. If noted:  D/C p.o. intake, provide non-oral nutrition/hydration/meds, and contact this service @ [phone]/change of breathing pattern/cough/gurgly voice/fever/pneumonia/throat clearing/upper respiratory infection

## 2023-01-01 NOTE — SWALLOW BEDSIDE ASSESSMENT ADULT - COMMENTS
Pt unknown to this service 12/28: CT HEAD: No acute abnormality. Chronic changes as above.  12/30: CXR: Right middle and lower lobe haziness, similar to prior.    Pt unknown to this service

## 2023-01-01 NOTE — PHYSICAL THERAPY INITIAL EVALUATION ADULT - PLANNED THERAPY INTERVENTIONS, PT EVAL
GOAL: pt will negotiate 4 small steps +UHR Awilda in 2 weeks./balance training/bed mobility training/gait training/strengthening/transfer training

## 2023-01-01 NOTE — PROGRESS NOTE ADULT - SUBJECTIVE AND OBJECTIVE BOX
O: hypotensive on phenylephrine     T(C): 37.2 (01-01-23 @ 15:00), Max: 37.4 (01-01-23 @ 07:00)  HR: 107 (01-01-23 @ 18:45) (88 - 130)  BP: 113/59 (01-01-23 @ 18:30) (89/47 - 143/121)  RR: 22 (01-01-23 @ 18:45) (14 - 29)  SpO2: 100% (01-01-23 @ 18:45) (91% - 100%)  12-31-22 @ 07:01  -  01-01-23 @ 07:00  --------------------------------------------------------  IN: 1010 mL / OUT: 1585 mL / NET: -575 mL    01-01-23 @ 07:01  -  01-01-23 @ 18:59  --------------------------------------------------------  IN: 473 mL / OUT: 470 mL / NET: 3 mL    acetaminophen     Tablet .. 650 milliGRAM(s) Oral every 6 hours PRN  albuterol/ipratropium for Nebulization 3 milliLiter(s) Nebulizer every 4 hours  buDESOnide    Inhalation Suspension 0.25 milliGRAM(s) Inhalation every 12 hours  chlorhexidine 4% Liquid 1 Application(s) Topical daily  enoxaparin Injectable 40 milliGRAM(s) SubCutaneous <User Schedule>  folic acid 1 milliGRAM(s) Oral daily  insulin lispro (ADMELOG) corrective regimen sliding scale   SubCutaneous every 6 hours  metoprolol tartrate 25 milliGRAM(s) Oral every 12 hours  phenylephrine    Infusion 0.2 MICROgram(s)/kG/Min IV Continuous <Continuous>  pregabalin 100 milliGRAM(s) Oral every 8 hours  sodium chloride 3%  Inhalation 4 milliLiter(s) Inhalation every 12 hours  tamsulosin 0.4 milliGRAM(s) Oral at bedtime  traMADol 25 milliGRAM(s) Oral every 4 hours PRN    General: No acute distress  HEENT: Anicteric sclerae  Cardiac: [id number]  Lungs: Clear  Abdomen: Soft, non-tender, +BS  Extremities: No c/c/e  Skin/Incision Site: Clean, dry and intact  Neurologic: AOx2 name and place(dementia), FC, CROSS, BUE distally AG 4/5, LLE distally AG, DF/PF 5/5 bilat , no annette, no clonus, Welsh speaking    LABS:  Na: 135 (01-01 @ 10:23), 138 (12-31 @ 22:50), 139 (12-31 @ 16:54), 138 (12-31 @ 00:53), 138 (12-30 @ 12:02), 139 (12-30 @ 05:36)  K: 4.0 (01-01 @ 10:23), 4.2 (12-31 @ 22:50), 4.3 (12-31 @ 16:54), 4.3 (12-31 @ 00:53), 4.3 (12-30 @ 12:02), 4.1 (12-30 @ 05:36)  Cl: 101 (01-01 @ 10:23), 101 (12-31 @ 22:50), 101 (12-31 @ 16:54), 101 (12-31 @ 00:53), 104 (12-30 @ 12:02), 104 (12-30 @ 05:36)  CO2: 24 (01-01 @ 10:23), 23 (12-31 @ 22:50), 24 (12-31 @ 16:54), 24 (12-31 @ 00:53), 24 (12-30 @ 12:02), 24 (12-30 @ 05:36)  BUN: 39 (01-01 @ 10:23), 30 (12-31 @ 22:50), 28 (12-31 @ 16:54), 28 (12-31 @ 00:53), 24 (12-30 @ 12:02), 22 (12-30 @ 05:36)  Cr: 0.99 (01-01 @ 10:23), 0.87 (12-31 @ 22:50), 0.91 (12-31 @ 16:54), 0.94 (12-31 @ 00:53), 0.89 (12-30 @ 12:02), 0.90 (12-30 @ 05:36)  Glu: 176(01-01 @ 10:23), 238(12-31 @ 22:50), 231(12-31 @ 16:54), 160(12-31 @ 00:53), 187(12-30 @ 12:02), 140(12-30 @ 05:36)    Hgb: 9.6 (01-01 @ 13:54), 7.2 (01-01 @ 10:23), 8.3 (12-31 @ 22:50), 8.7 (12-31 @ 16:54), 9.0 (12-31 @ 00:53), 9.6 (12-30 @ 12:02), 8.7 (12-30 @ 05:36)  Hct: 29.5 (01-01 @ 13:54), 21.8 (01-01 @ 10:23), 25.3 (12-31 @ 22:50), 26.2 (12-31 @ 16:54), 27.4 (12-31 @ 00:53), 29.7 (12-30 @ 12:02), 26.4 (12-30 @ 05:36)  WBC: 14.53 (01-01 @ 13:54), 9.35 (01-01 @ 10:23), 7.61 (12-31 @ 22:50), 8.80 (12-31 @ 16:54), 8.86 (12-31 @ 00:53), 10.04 (12-30 @ 12:02), 8.46 (12-30 @ 05:36)  Plt: 203 (01-01 @ 13:54), 141 (01-01 @ 10:23), 163 (12-31 @ 22:50), 161 (12-31 @ 16:54), 151 (12-31 @ 00:53), 159 (12-30 @ 12:02), 147 (12-30 @ 05:36)    INR: 1.14 12-30-22 @ 05:36  PTT: 20.2 12-30-22 @ 05:36            a/p lumbar burst frcature s/p Fusion, spine, thoracolumbar, posterior approach  neuro checks q 2 hr   monitor drain output   CV : SBP goal   100-160 mmhg   hypotension, hemorrhagic shock, on phenylephrine s/p blood transfusion    a fib, xarelto on hold for surgery on metorpolol   Pulm: NC  ? reactive airway on albuterol/ipratropium and   buDESOnide  GI: thick liquid diet   Renal: NS 75 ml/hr   ID afebrile  Endo:  target sugar 120-180   Hem: SCD,   hold chemoprophylaxis for now    left psoas hematoma  drop in hgb   s/p blood transfusion , repeat cbc tonight     35 critical care time as patient is in shock  O: hypotensive on phenylephrine     T(C): 37.2 (01-01-23 @ 15:00), Max: 37.4 (01-01-23 @ 07:00)  HR: 107 (01-01-23 @ 18:45) (88 - 130)  BP: 113/59 (01-01-23 @ 18:30) (89/47 - 143/121)  RR: 22 (01-01-23 @ 18:45) (14 - 29)  SpO2: 100% (01-01-23 @ 18:45) (91% - 100%)  12-31-22 @ 07:01  -  01-01-23 @ 07:00  --------------------------------------------------------  IN: 1010 mL / OUT: 1585 mL / NET: -575 mL    01-01-23 @ 07:01  -  01-01-23 @ 18:59  --------------------------------------------------------  IN: 473 mL / OUT: 470 mL / NET: 3 mL    acetaminophen     Tablet .. 650 milliGRAM(s) Oral every 6 hours PRN  albuterol/ipratropium for Nebulization 3 milliLiter(s) Nebulizer every 4 hours  buDESOnide    Inhalation Suspension 0.25 milliGRAM(s) Inhalation every 12 hours  chlorhexidine 4% Liquid 1 Application(s) Topical daily  enoxaparin Injectable 40 milliGRAM(s) SubCutaneous <User Schedule>  folic acid 1 milliGRAM(s) Oral daily  insulin lispro (ADMELOG) corrective regimen sliding scale   SubCutaneous every 6 hours  metoprolol tartrate 25 milliGRAM(s) Oral every 12 hours  phenylephrine    Infusion 0.2 MICROgram(s)/kG/Min IV Continuous <Continuous>  pregabalin 100 milliGRAM(s) Oral every 8 hours  sodium chloride 3%  Inhalation 4 milliLiter(s) Inhalation every 12 hours  tamsulosin 0.4 milliGRAM(s) Oral at bedtime  traMADol 25 milliGRAM(s) Oral every 4 hours PRN    General: No acute distress  HEENT: Anicteric sclerae  Cardiac: [id number]  Lungs: Clear  Abdomen: Soft, non-tender, +BS  Extremities: No c/c/e  Skin/Incision Site: Clean, dry and intact  Neurologic: AOx2 name and place(dementia), FC, CROSS, BUE distally AG 4/5, LLE distally AG, DF/PF 5/5 bilat , no annette, no clonus, Bulgarian speaking    LABS:  Na: 135 (01-01 @ 10:23), 138 (12-31 @ 22:50), 139 (12-31 @ 16:54), 138 (12-31 @ 00:53), 138 (12-30 @ 12:02), 139 (12-30 @ 05:36)  K: 4.0 (01-01 @ 10:23), 4.2 (12-31 @ 22:50), 4.3 (12-31 @ 16:54), 4.3 (12-31 @ 00:53), 4.3 (12-30 @ 12:02), 4.1 (12-30 @ 05:36)  Cl: 101 (01-01 @ 10:23), 101 (12-31 @ 22:50), 101 (12-31 @ 16:54), 101 (12-31 @ 00:53), 104 (12-30 @ 12:02), 104 (12-30 @ 05:36)  CO2: 24 (01-01 @ 10:23), 23 (12-31 @ 22:50), 24 (12-31 @ 16:54), 24 (12-31 @ 00:53), 24 (12-30 @ 12:02), 24 (12-30 @ 05:36)  BUN: 39 (01-01 @ 10:23), 30 (12-31 @ 22:50), 28 (12-31 @ 16:54), 28 (12-31 @ 00:53), 24 (12-30 @ 12:02), 22 (12-30 @ 05:36)  Cr: 0.99 (01-01 @ 10:23), 0.87 (12-31 @ 22:50), 0.91 (12-31 @ 16:54), 0.94 (12-31 @ 00:53), 0.89 (12-30 @ 12:02), 0.90 (12-30 @ 05:36)  Glu: 176(01-01 @ 10:23), 238(12-31 @ 22:50), 231(12-31 @ 16:54), 160(12-31 @ 00:53), 187(12-30 @ 12:02), 140(12-30 @ 05:36)    Hgb: 9.6 (01-01 @ 13:54), 7.2 (01-01 @ 10:23), 8.3 (12-31 @ 22:50), 8.7 (12-31 @ 16:54), 9.0 (12-31 @ 00:53), 9.6 (12-30 @ 12:02), 8.7 (12-30 @ 05:36)  Hct: 29.5 (01-01 @ 13:54), 21.8 (01-01 @ 10:23), 25.3 (12-31 @ 22:50), 26.2 (12-31 @ 16:54), 27.4 (12-31 @ 00:53), 29.7 (12-30 @ 12:02), 26.4 (12-30 @ 05:36)  WBC: 14.53 (01-01 @ 13:54), 9.35 (01-01 @ 10:23), 7.61 (12-31 @ 22:50), 8.80 (12-31 @ 16:54), 8.86 (12-31 @ 00:53), 10.04 (12-30 @ 12:02), 8.46 (12-30 @ 05:36)  Plt: 203 (01-01 @ 13:54), 141 (01-01 @ 10:23), 163 (12-31 @ 22:50), 161 (12-31 @ 16:54), 151 (12-31 @ 00:53), 159 (12-30 @ 12:02), 147 (12-30 @ 05:36)    INR: 1.14 12-30-22 @ 05:36  PTT: 20.2 12-30-22 @ 05:36            a/p lumbar burst frcature s/p Fusion, spine, thoracolumbar, posterior approach  neuro checks q 4 hr   monitor drain output  tylenol for pain    CV : SBP goal   100-160 mmhg   hypotension, hemorrhagic shock, on phenylephrine s/p blood transfusion    a fib xarelto on hold for surgery on metorpolol   Pulm: NC  ? reactive airway on albuterol/ipratropium and   buDESOnide  GI: thick liquid diet   Renal: NS 75 ml/hr   ID afebrile  Endo:  target sugar 120-180   finger sticks q 6 hr, ISS   Hem: SCD,   hold chemoprophylaxis for now    left psoas hematoma  drop in hgb   s/p blood transfusion , repeat cbc tonight     35 critical care time as patient is in shock

## 2023-01-01 NOTE — SWALLOW BEDSIDE ASSESSMENT ADULT - SLP GENERAL OBSERVATIONS
Pt awake in bed, family present at bedside (served as ). Pt able to communicate needs and follow simple directions for exam. + 2L O2 via NC. + Baseline congested cough. + Surgical drain. Unilateral wrist restraint.

## 2023-01-01 NOTE — OCCUPATIONAL THERAPY INITIAL EVALUATION ADULT - NSOTDISCHREC_GEN_A_CORE
If pt to go home recommend sal lift and 3:1 commode. Pt required assistance for all transfers and ADLs./Sub-acute Rehab

## 2023-01-01 NOTE — PROGRESS NOTE ADULT - ASSESSMENT
ASSESSMENT:     NEURO:  [id number]   Surgical drains per NSGY, Pain control  cont megan, aricept  Activity: [] OOB as tolerated [] Bedrest [x] PT [x] OT [] PMNR    PULM: cont duonebs  Incentive spirometry, mobilize as tolerated    CV: resume xarelto when ok w nsgy for afib  Keep -160mmHg  cont bb  ef 62% on 12/30    RENAL: cont flomax  IVF until good PO intake, d/c wilson in AM if ok w nsgy    GI:  Diet: Dysphagia screen and then advance diet as tolerated  GI prophylaxis [x] not indicated [] PPI [] other:  Bowel regimen     ENDO: ISS  cont statin  a1c 6.8  Goal euglycemia (-180)    HEME/ONC:  VTE prophylaxis: [x] SCDs [] chemoprophylaxis resume if ok w nsgy    ID:  Milena-op antibiotics ancef    MISC:    SOCIAL/FAMILY:  [] awaiting [x] updated wife zuleyma and daughter/granddaughter at bedside [] family meeting    CODE STATUS:  [x] Full Code [] DNR [] DNI [] Palliative/Comfort Care    DISPOSITION:  [x] ICU [] Stroke Unit [] Floor [] EMU [] RCU [] PCU        Contact: [phone] ASSESSMENT:     NEURO:  [email or web address]   Surgical drains per NSGY, Pain control  cont megan, aricept  prn tramadol  Activity: [] OOB as tolerated [] Bedrest [x] PT [x] OT [] PMNR    PULM: cont duonebs  NC  Incentive spirometry, mobilize as tolerated    CV: resume xarelto when ok w nsgy for afib  Keep -160mmHg  cont bb  ef 62% on 12/30    RENAL: cont flomax  IVL d/c wilson     GI:  Diet: Dysphagia screen and then advance diet as tolerated  THICK LIQUIDS and repeat S&S (pt coughing w water)  GI prophylaxis [x] not indicated [] PPI [] other:  Bowel regimen     ENDO: ISS  cont statin  a1c 6.8  Goal euglycemia (-180)    HEME/ONC:  VTE prophylaxis: [x] SCDs [] chemoprophylaxis resume if ok w nsgy    ID:  Milena-op antibiotics ancef  monitor for fever (PNA)    MISC:    SOCIAL/FAMILY:  [] awaiting [x] updated wife zuleyma and daughter/granddaughter at bedside [] family meeting    CODE STATUS:  [x] Full Code [] DNR [] DNI [] Palliative/Comfort Care    DISPOSITION:  [x] ICU [] Stroke Unit [] Floor [] EMU [] RCU [] PCU        Contact: [phone]

## 2023-01-01 NOTE — SWALLOW BEDSIDE ASSESSMENT ADULT - SLP PERTINENT HISTORY OF CURRENT PROBLEM
12/30 Pt planned for spine surgery for L2 fx. The patient went down to OR and was audibly wheezing and having somewhat laboured breathing. Given that this was an acute change and no pulmonary evaluation had been undertaken anesthesia aborted the case pending pulmonary evaluation/clearance. Pulm subsequently saw pt->Wheezing may have been secondary to mild volume overload. 12/31: pt s/p L1-L4 percutaneous fusion

## 2023-01-01 NOTE — PHYSICAL THERAPY INITIAL EVALUATION ADULT - PERTINENT HX OF CURRENT PROBLEM, REHAB EVAL
88yo M h/o dementia, Afib on Xarelto, presenting as transfer from [facility] after mechanical fall. Pt had a ground level fall yesterday, and was transferred here for higher level of care. Pt is s/p L1-L4 percutaneous fusion  XRay spine 12/31: Ultimately implanted L1-L4 level posterior spinal fusion hardware   consisting of pedicle screws with interconnecting rods. Redemonstrated distracted inferior L2 fracture as on MRI study from 12/29/2022.Generalized osteopenia and advanced multilevel degenerative disc changes also apparent

## 2023-01-01 NOTE — PHYSICAL THERAPY INITIAL EVALUATION ADULT - WEIGHT-BEARING RESTRICTIONS: STAND/SIT, REHAB EVAL
"Requested Prescriptions   Pending Prescriptions Disp Refills     metFORMIN (GLUCOPHAGE-XR) 500 MG 24 hr tablet [Pharmacy Med Name: METFORMIN HCL ER TABS 500MG] 360 tablet 0    Last Written Prescription Date:  04/16/2018 #360 x 0  Last filled 04/18/2018  Last office visit: 6/25/2018 WALESKA Pickeringes   Future Office Visit:  None   Sig: TAKE 4 TABLETS DAILY    Biguanide Agents Passed    7/15/2018  6:36 AM       Passed - Blood pressure less than 140/90 in past 6 months    BP Readings from Last 3 Encounters:   06/25/18 124/70   06/12/18 122/64   06/10/18 164/84                Passed - Patient has documented LDL within the past 12 mos.    Recent Labs   Lab Test  11/17/17   1543   LDL  136*            Passed - Patient has had a Microalbumin in the past 12 mos.    Recent Labs   Lab Test  11/17/17   1547   MICROL  27   UMALCR  18.20            Passed - Patient is age 10 or older       Passed - Patient has documented A1c within the specified period of time.    If HgbA1C is 8 or greater, it needs to be on file within the past 3 months.  If less than 8, must be on file within the past 6 months.     Recent Labs   Lab Test  06/12/18   1518   A1C  7.2*            Passed - Patient's CR is NOT>1.4 OR Patient's EGFR is NOT<45 within past 12 mos.    Recent Labs   Lab Test  11/17/17   1543   GFRESTIMATED  79   GFRESTBLACK  >90       Recent Labs   Lab Test  11/17/17   1543   CR  0.74            Passed - Patient does NOT have a diagnosis of CHF.       Passed - Patient is not pregnant       Passed - Patient has not had a positive pregnancy test within the past 12 mos.        Passed - Recent (6 mo) or future (30 days) visit within the authorizing provider's specialty    Patient had office visit in the last 6 months or has a visit in the next 30 days with authorizing provider or within the authorizing provider's specialty.  See \"Patient Info\" tab in inbasket, or \"Choose Columns\" in Meds & Orders section of the refill encounter.              "
full weight-bearing

## 2023-01-01 NOTE — PHYSICAL THERAPY INITIAL EVALUATION ADULT - NSPTDISCHREC_GEN_A_CORE
If pt d/c home would require home PT, assist with all mobility, & DME: mechanical pt lift device (sal) & 3:1 commode./Sub-acute Rehab

## 2023-01-01 NOTE — OCCUPATIONAL THERAPY INITIAL EVALUATION ADULT - BALANCE TRAINING, PT EVAL
GOAL: Pt will improve dynamic sitting balance to good in order to improve functional mobility in 4 weeks.

## 2023-01-01 NOTE — PHYSICAL THERAPY INITIAL EVALUATION ADULT - GAIT DISTANCE, PT EVAL
[facility]  [facility]    Subjective:  4 yo  Diaper rash  3 weeks  Spares skin folds and buttocks. Arleen-area.   + itching, but mom doesn't note scratching or discomfort. Has bene using aquaphor and nystatin during gerda, resolved. But returned. Has been using same for 1 week and past 3 days looks worse. On virtual exam.   Redness around labia, extending toward anus. Seems to spare since folds. ROS otherwise negative     Past medical, surgical, family and social history were reviewed, non-contributory, and unchanged unless otherwise stated. Objective: There were no vitals taken for this visit. Exam is as noted by resident with the following changes, additions or corrections:    General:  NAD; alert & oriented x 3   Heart:  RRR, no murmurs, gallops, or rubs. Lungs:  CTA bilaterally, no wheeze, rales or rhonchi  Abd: bowel sounds present, nontender, nondistended, no masses  Extrem:  No clubbing, cyanosis, or edema    Assessment/Plan:  Diaper dermatitis vs candida, less likely strep dermatitis. Inc frequency of diaper changes. Topical mild steroid. Continue nystatin. Recheck later this week and if not improving, then needs to come into office. Attending Physician Statement  I have reviewed the chart, including any radiology or labs. I have discussed the case, including pertinent history and exam findings with the resident. I agree with the assessment, plan and orders as documented by the resident. Please refer to the resident note for additional information.       Electronically signed by Wilfrido Mireles MD on 1/18/2022 at 9:00 AM 2 shuffling steps toward HOB

## 2023-01-01 NOTE — SWALLOW BEDSIDE ASSESSMENT ADULT - ASR SWALLOW RECOMMEND DIAG
Service will continue to follow and re-assess candidacy for diet upgrade and indication for objective testing.

## 2023-01-01 NOTE — SWALLOW BEDSIDE ASSESSMENT ADULT - PHARYNGEAL PHASE
Delayed pharyngeal swallow Within functional limits audible (suspect discoordinated) swallows, increased coughing post PO intake/Delayed pharyngeal swallow

## 2023-01-01 NOTE — SWALLOW BEDSIDE ASSESSMENT ADULT - SWALLOW EVAL: DIAGNOSIS
Py is 86 y/o M admitted for L2 fracture, and small associated L psoas hematoma. Now presenting with an oral and suspected pharyngeal dysphagia superimposed upon baseline cough which makes behavioral analysis of swallow function difficult to a degree. The swallow is marked by prolonged mastication, delayed oral transit time, suspected uncontrolled loss, delayed pharyngeal swallows, audible (suspect discoordinated) swallows and increased coughing post PO intake of mildly thick liquids suggestive of laryngeal penetration/aspiration.

## 2023-01-01 NOTE — OCCUPATIONAL THERAPY INITIAL EVALUATION ADULT - DIAGNOSIS, OT EVAL
Pt demonstrated decreased strength, balance, ROM and cognition limiting ADLs and functional abilities.

## 2023-01-01 NOTE — PHYSICAL THERAPY INITIAL EVALUATION ADULT - ADDITIONAL COMMENTS
Prior to admission patient required assist with functional mobility and ADLs, able to ambulate with RW, negotiate stairs with assist. .pt lives in a  with multiple family members who assist patient as needed, 3-4 small steps to enter +[facility] 1st floor setup inside.

## 2023-01-01 NOTE — OCCUPATIONAL THERAPY INITIAL EVALUATION ADULT - LIVES WITH, PROFILE
As per son at bedside, pt lives in  with spouse, 3STE, family rotates for daily care, pt alone during the night. PTA pt was CGA with RW for functional mobility. Has W/C

## 2023-01-01 NOTE — PROGRESS NOTE ADULT - SUBJECTIVE AND OBJECTIVE BOX
SUMMARY: 88yo Hungarian speaking male w/ PMH of Afib on xarelto & Alzhiemers presenting as transfer to [facility] from [facility] after mechanical fall. Noted GLF yesterday and associated L2 fracture & L psoas hematoma on scan. Received [facility] at [facility] ED. Upon transfer to [facility], assessed by neurosurg for percutaneous T12-L4 instrumentation. Upon arrival in OR, pt exhibited labored breathing, necessitating transfer back to floor. SICU team provided Duoneb & Lasix while in unit. Pulm c/s for pre-op eval.     HOSPITAL COURSE: s/p L1-L4 percutaneous fusion    24 HOUR EVENTS: seen in nscu    REVIEW OF SYSTEMS: unobtainable pt demented    ALLERGIES: Allergies    aspirin (Rash)    Intolerances        VITALS/DATA/ORDERS: [x] Reviewed    DEVICES:   [] Restraints [x] PIVs [] ET tube [] central line [] PICC [] arterial line [x] wilson [] NGT/OGT [] EVD [] LD [] MARCELINO/HMV [] LiCOX [] ICP monitor [] Trach [] PEG [] Chest Tube [] other:    EXAMINATION: w family in [place]ic  General: No acute distress  HEENT: Anicteric sclerae  Cardiac: [id number]  Lungs: Clear  Abdomen: Soft, non-tender, +BS  Extremities: No c/c/e  Skin/Incision Site: Clean, dry and intact  Neurologic: AOx2 name and place(dementia), FC, CROSS, BUE distally AG 4/5, LLE distally AG, RLE 2/5 on hip and knee, DF/PF 5/5 bilat , no annette, no clonus, Hungarian speaking SUMMARY: 88yo Bermudian speaking male w/ PMH of Afib on xarelto & Alzhiemers presenting as transfer to [facility] from [facility] after mechanical fall. Noted GLF yesterday and associated L2 fracture & L psoas hematoma on scan. Received [facility] at [facility] ED. Upon transfer to [facility], assessed by neurosurg for percutaneous T12-L4 instrumentation. Upon arrival in OR, pt exhibited labored breathing, necessitating transfer back to floor. SICU team provided Duoneb & Lasix while in unit. Pulm c/s for pre-op eval.     HOSPITAL COURSE: s/p L1-L4 percutaneous fusion    24 HOUR EVENTS: seen in nscu    REVIEW OF SYSTEMS: unobtainable pt demented    ALLERGIES: Allergies    aspirin (Rash)    Intolerances        VITALS/DATA/ORDERS: [x] Reviewed    DEVICES:   [] Restraints [x] PIVs [] ET tube [] central line [] PICC [x] arterial line [x] wilson [] NGT/OGT [] EVD [] LD [] MARCELINO/HMV [] LiCOX [] ICP monitor [] Trach [] PEG [] Chest Tube [] other:    EXAMINATION: w family in [place]  General: No acute distress  HEENT: Anicteric sclerae  Cardiac: [id number]  Lungs: Clear  Abdomen: Soft, non-tender, +BS  Extremities: No c/c/e  Skin/Incision Site: Clean, dry and intact  Neurologic: AOx2 name and place(dementia), FC, CROSS, BUE distally AG 4/5, LLE distally AG, RLE 2/5 on hip and knee, DF/PF 5/5 bilat , no annette, no clonus, Bermudian speaking SUMMARY: 88yo Citizen of [place] speaking male w/ PMH of Afib on xarelto & Alzhiemers presenting as transfer to [facility] from [facility] after mechanical fall. Noted GLF yesterday and associated L2 fracture & L psoas hematoma on scan. Received [facility] at [facility] ED. Upon transfer to [facility], assessed by neurosurg for percutaneous T12-L4 instrumentation. Upon arrival in OR, pt exhibited labored breathing, necessitating transfer back to floor. SICU team provided Duoneb & Lasix while in unit. Pulm c/s for pre-op eval.     HOSPITAL COURSE: s/p L1-L4 percutaneous fusion    24 HOUR EVENTS: seen in nscu    REVIEW OF SYSTEMS: unobtainable pt demented    ALLERGIES: Allergies    aspirin (Rash)    Intolerances        VITALS/DATA/ORDERS: [x] Reviewed    DEVICES:   [] Restraints [x] PIVs [] ET tube [] central line [] PICC [x] arterial line [x] wilson [] NGT/OGT [] EVD [] LD [] MARCELINO/HMV [] LiCOX [] ICP monitor [] Trach [] PEG [] Chest Tube [] other:    EXAMINATION: w family in [place]  General: No acute distress  HEENT: Anicteric sclerae  Cardiac: [id number]  Lungs: Clear  Abdomen: Soft, non-tender, +BS  Extremities: No c/c/e  Skin/Incision Site: Clean, dry and intact  Neurologic: AOx2 name and place(dementia), FC, CROSS, BUE distally AG 4/5, LLE distally AG, DF/PF 5/5 bilat , no annette, no clonus, Citizen of [place] speaking

## 2023-01-01 NOTE — PROGRESS NOTE ADULT - ATTENDING COMMENTS
Patient seen and examined in AM with fellow and NSGY residents. At that time, he was evaluated with family who he preferred translate for him. He had no complaints other than being hungry. On exam, he was quite strong throughout.     He reported new onset diplopia. He was re-evaluated at that time and reported binocular horizontal diplopia, which resolved when one eye was covered. He did not have any overt restriction in extra-ocular eye movements or any other focal neurologic deficits.     Of note, at the time of symptoms, he had SBP in high 80s/low 90s for which a fluid bolus was given. Phenylephrine was also ordered. Patient seen and examined in AM with fellow and NSGY residents. At that time, he was evaluated with family who he preferred translate for him. He had no complaints other than being hungry. On exam, he was quite strong throughout.     He reported new onset diplopia. He was re-evaluated at that time and reported binocular horizontal diplopia, which resolved when one eye was covered. He did not have any overt restriction in extra-ocular eye movements or any other focal neurologic deficits. He followed commands and moves all limbs well.     Of note, at the time of symptoms, he had SBP in high 80s/low 90s for which a fluid bolus was given. Phenylephrine was also ordered.    MAP > 65mmHg; fluids, pressors as needed; relative hypotension since additional dose of metoprolol but will r/o other causes of hypotension including infection and blood loss  CT Head given eye findings; do not anticipate intracranial lesion, but remote hemorrhages have been described after spinal surgery; findings may be due to hypotension  Aspiration precautions    At risk of death/neuro decline due to: hypotension

## 2023-01-01 NOTE — SWALLOW BEDSIDE ASSESSMENT ADULT - SWALLOW EVAL: PATIENT/FAMILY GOALS STATEMENT
Family reported h/o occasional coughing with PO intake at baseline. Reported improving congested cough (which started during hospital stay).

## 2023-01-01 NOTE — PHYSICAL THERAPY INITIAL EVALUATION ADULT - BALANCE DISTURBANCE, IDENTIFIED IMPAIRMENT CONTRIBUTE, REHAB EVAL
Please read and follow the handout(s) instructions. Return, if needed, for increased or worsening symptoms and as directed by the handout(s).    You were found to have a 3mm kidney stone. It is sitting just above your bladder and ready to fall into it. When it does your pain should stop. I would expect this to happen in the next 24-48hours. See the new medication list. Use as directed. Strain the urine and collect any stone in the jar supplied. You may take this to your clinic to have it analyzed for the type of stone you produced. This may help your clinic doctor guide you in preventing additional stones from forming in your kidneys. I would recommend you make an appointment with your clinic in the next 2 weeks to recheck the kidney and make sure the stone has gone. You should not drive if using the narcotic Percocet medications for pain. You have been given a medication called Flomax. This medication is used once a day but can be stopped if you pass the stone. It is possible this medication could lower your blood pressure some so be careful when standing up quickly to avoid lightheadedness.    I hope you feel better soon!    Electronically signed, Bimal Thrasher DO     decreased strength

## 2023-01-02 LAB
ANION GAP SERPL CALC-SCNC: 11 MMOL/L — SIGNIFICANT CHANGE UP (ref 5–17)
APPEARANCE UR: CLEAR — SIGNIFICANT CHANGE UP
BACTERIA # UR AUTO: NEGATIVE — SIGNIFICANT CHANGE UP
BILIRUB UR-MCNC: NEGATIVE — SIGNIFICANT CHANGE UP
BUN SERPL-MCNC: 50 MG/DL — HIGH (ref 7–23)
CALCIUM SERPL-MCNC: 8.6 MG/DL — SIGNIFICANT CHANGE UP (ref 8.4–10.5)
CHLORIDE SERPL-SCNC: 103 MMOL/L — SIGNIFICANT CHANGE UP (ref 96–108)
CO2 SERPL-SCNC: 23 MMOL/L — SIGNIFICANT CHANGE UP (ref 22–31)
COLOR SPEC: YELLOW — SIGNIFICANT CHANGE UP
CREAT SERPL-MCNC: 1 MG/DL — SIGNIFICANT CHANGE UP (ref 0.5–1.3)
DIFF PNL FLD: NEGATIVE — SIGNIFICANT CHANGE UP
EGFR: 73 ML/MIN/1.73M2 — SIGNIFICANT CHANGE UP
EPI CELLS # UR: 0 /HPF — SIGNIFICANT CHANGE UP
GLUCOSE BLDC GLUCOMTR-MCNC: 144 MG/DL — HIGH (ref 70–99)
GLUCOSE BLDC GLUCOMTR-MCNC: 180 MG/DL — HIGH (ref 70–99)
GLUCOSE BLDC GLUCOMTR-MCNC: 181 MG/DL — HIGH (ref 70–99)
GLUCOSE BLDC GLUCOMTR-MCNC: 184 MG/DL — HIGH (ref 70–99)
GLUCOSE SERPL-MCNC: 184 MG/DL — HIGH (ref 70–99)
GLUCOSE UR QL: NEGATIVE — SIGNIFICANT CHANGE UP
HCT VFR BLD CALC: 29.2 % — LOW (ref 39–50)
HGB BLD-MCNC: 9.3 G/DL — LOW (ref 13–17)
HYALINE CASTS # UR AUTO: 0 /LPF — SIGNIFICANT CHANGE UP (ref 0–2)
KETONES UR-MCNC: NEGATIVE — SIGNIFICANT CHANGE UP
LEUKOCYTE ESTERASE UR-ACNC: NEGATIVE — SIGNIFICANT CHANGE UP
MCHC RBC-ENTMCNC: 30 PG — SIGNIFICANT CHANGE UP (ref 27–34)
MCHC RBC-ENTMCNC: 31.8 GM/DL — LOW (ref 32–36)
MCV RBC AUTO: 94.2 FL — SIGNIFICANT CHANGE UP (ref 80–100)
NITRITE UR-MCNC: NEGATIVE — SIGNIFICANT CHANGE UP
NRBC # BLD: 0 /100 WBCS — SIGNIFICANT CHANGE UP (ref 0–0)
PH UR: 6 — SIGNIFICANT CHANGE UP (ref 5–8)
PLATELET # BLD AUTO: 178 K/UL — SIGNIFICANT CHANGE UP (ref 150–400)
POTASSIUM SERPL-MCNC: 4.3 MMOL/L — SIGNIFICANT CHANGE UP (ref 3.5–5.3)
POTASSIUM SERPL-SCNC: 4.3 MMOL/L — SIGNIFICANT CHANGE UP (ref 3.5–5.3)
PROT UR-MCNC: ABNORMAL
RBC # BLD: 3.1 M/UL — LOW (ref 4.2–5.8)
RBC # FLD: 15.5 % — HIGH (ref 10.3–14.5)
RBC CASTS # UR COMP ASSIST: 6 /HPF — HIGH (ref 0–4)
SODIUM SERPL-SCNC: 137 MMOL/L — SIGNIFICANT CHANGE UP (ref 135–145)
SP GR SPEC: 1.03 — HIGH (ref 1.01–1.02)
UROBILINOGEN FLD QL: ABNORMAL
WBC # BLD: 8.46 K/UL — SIGNIFICANT CHANGE UP (ref 3.8–10.5)
WBC # FLD AUTO: 8.46 K/UL — SIGNIFICANT CHANGE UP (ref 3.8–10.5)
WBC UR QL: 2 /HPF — SIGNIFICANT CHANGE UP (ref 0–5)

## 2023-01-02 PROCEDURE — 71045 X-RAY EXAM CHEST 1 VIEW: CPT | Mod: 26

## 2023-01-02 PROCEDURE — 93010 ELECTROCARDIOGRAM REPORT: CPT

## 2023-01-02 PROCEDURE — 74018 RADEX ABDOMEN 1 VIEW: CPT | Mod: 26

## 2023-01-02 PROCEDURE — 99233 SBSQ HOSP IP/OBS HIGH 50: CPT

## 2023-01-02 RX ORDER — METOPROLOL TARTRATE 50 MG
25 TABLET ORAL EVERY 8 HOURS
Refills: 0 | Status: DISCONTINUED | OUTPATIENT
Start: 2023-01-02 | End: 2023-01-10

## 2023-01-02 RX ORDER — SENNA PLUS 8.6 MG/1
2 TABLET ORAL AT BEDTIME
Refills: 0 | Status: DISCONTINUED | OUTPATIENT
Start: 2023-01-02 | End: 2023-01-10

## 2023-01-02 RX ORDER — ACETAMINOPHEN 500 MG
1000 TABLET ORAL ONCE
Refills: 0 | Status: COMPLETED | OUTPATIENT
Start: 2023-01-02 | End: 2023-01-02

## 2023-01-02 RX ORDER — PIPERACILLIN AND TAZOBACTAM 4; .5 G/20ML; G/20ML
3.38 INJECTION, POWDER, LYOPHILIZED, FOR SOLUTION INTRAVENOUS ONCE
Refills: 0 | Status: COMPLETED | OUTPATIENT
Start: 2023-01-02 | End: 2023-01-02

## 2023-01-02 RX ORDER — POLYETHYLENE GLYCOL 3350 17 G/17G
17 POWDER, FOR SOLUTION ORAL EVERY 12 HOURS
Refills: 0 | Status: DISCONTINUED | OUTPATIENT
Start: 2023-01-02 | End: 2023-01-10

## 2023-01-02 RX ORDER — INSULIN LISPRO 100/ML
VIAL (ML) SUBCUTANEOUS
Refills: 0 | Status: DISCONTINUED | OUTPATIENT
Start: 2023-01-02 | End: 2023-01-06

## 2023-01-02 RX ORDER — PIPERACILLIN AND TAZOBACTAM 4; .5 G/20ML; G/20ML
3.38 INJECTION, POWDER, LYOPHILIZED, FOR SOLUTION INTRAVENOUS EVERY 8 HOURS
Refills: 0 | Status: DISCONTINUED | OUTPATIENT
Start: 2023-01-03 | End: 2023-01-07

## 2023-01-02 RX ORDER — PIPERACILLIN AND TAZOBACTAM 4; .5 G/20ML; G/20ML
3.38 INJECTION, POWDER, LYOPHILIZED, FOR SOLUTION INTRAVENOUS ONCE
Refills: 0 | Status: COMPLETED | OUTPATIENT
Start: 2023-01-03 | End: 2023-01-03

## 2023-01-02 RX ADMIN — Medication 25 MILLIGRAM(S): at 05:16

## 2023-01-02 RX ADMIN — Medication 1 MILLIGRAM(S): at 11:16

## 2023-01-02 RX ADMIN — Medication 3 MILLILITER(S): at 05:59

## 2023-01-02 RX ADMIN — Medication 400 MILLIGRAM(S): at 20:49

## 2023-01-02 RX ADMIN — Medication 3 MILLILITER(S): at 12:20

## 2023-01-02 RX ADMIN — SODIUM CHLORIDE 4 MILLILITER(S): 9 INJECTION INTRAMUSCULAR; INTRAVENOUS; SUBCUTANEOUS at 17:33

## 2023-01-02 RX ADMIN — TAMSULOSIN HYDROCHLORIDE 0.4 MILLIGRAM(S): 0.4 CAPSULE ORAL at 21:51

## 2023-01-02 RX ADMIN — Medication 0.25 MILLIGRAM(S): at 05:59

## 2023-01-02 RX ADMIN — Medication 2: at 12:16

## 2023-01-02 RX ADMIN — Medication 25 MILLIGRAM(S): at 21:53

## 2023-01-02 RX ADMIN — TRAMADOL HYDROCHLORIDE 25 MILLIGRAM(S): 50 TABLET ORAL at 05:45

## 2023-01-02 RX ADMIN — SENNA PLUS 2 TABLET(S): 8.6 TABLET ORAL at 21:51

## 2023-01-02 RX ADMIN — Medication 100 MILLIGRAM(S): at 21:54

## 2023-01-02 RX ADMIN — Medication 100 MILLIGRAM(S): at 14:11

## 2023-01-02 RX ADMIN — PIPERACILLIN AND TAZOBACTAM 200 GRAM(S): 4; .5 INJECTION, POWDER, LYOPHILIZED, FOR SOLUTION INTRAVENOUS at 21:53

## 2023-01-02 RX ADMIN — Medication 3 MILLILITER(S): at 17:32

## 2023-01-02 RX ADMIN — Medication 5 MILLIGRAM(S): at 21:51

## 2023-01-02 RX ADMIN — Medication 3 MILLILITER(S): at 01:36

## 2023-01-02 RX ADMIN — Medication 3 MILLILITER(S): at 21:51

## 2023-01-02 RX ADMIN — Medication 3 MILLILITER(S): at 14:04

## 2023-01-02 RX ADMIN — Medication 0.25 MILLIGRAM(S): at 17:33

## 2023-01-02 RX ADMIN — TRAMADOL HYDROCHLORIDE 25 MILLIGRAM(S): 50 TABLET ORAL at 05:16

## 2023-01-02 RX ADMIN — ENOXAPARIN SODIUM 40 MILLIGRAM(S): 100 INJECTION SUBCUTANEOUS at 17:26

## 2023-01-02 RX ADMIN — POLYETHYLENE GLYCOL 3350 17 GRAM(S): 17 POWDER, FOR SOLUTION ORAL at 17:26

## 2023-01-02 RX ADMIN — Medication 100 MILLIGRAM(S): at 05:16

## 2023-01-02 RX ADMIN — Medication 2: at 21:54

## 2023-01-02 RX ADMIN — CHLORHEXIDINE GLUCONATE 1 APPLICATION(S): 213 SOLUTION TOPICAL at 11:23

## 2023-01-02 RX ADMIN — Medication 2: at 17:27

## 2023-01-02 RX ADMIN — SODIUM CHLORIDE 4 MILLILITER(S): 9 INJECTION INTRAMUSCULAR; INTRAVENOUS; SUBCUTANEOUS at 06:00

## 2023-01-02 NOTE — PROVIDER CONTACT NOTE (OTHER) - SITUATION
Rectal temp 101.5. tachy to 110. distended abdomen.
Pt. HR elevated 114
Pt arrived to 7T from ED. . otherwise VSS.
Pt's blood glucose is 282.

## 2023-01-02 NOTE — PROVIDER CONTACT NOTE (OTHER) - REASON
Rectal temp 101.5. tachy to 110. distended abdomen.
Pt tachycardic
blood glucose 282
Pt. HR elevated

## 2023-01-02 NOTE — PROGRESS NOTE ADULT - SUBJECTIVE AND OBJECTIVE BOX
Patient is a 88y old  Male who presents with a chief complaint of Transfer level 2 trauma (02 Jan 2023 09:01)      SUBJECTIVE / OVERNIGHT EVENTS:  Patient seen and examined.   Pain well controlled.      Vital Signs Last 24 Hrs  T(C): 36.7 (02 Jan 2023 11:00), Max: 37.2 (01 Jan 2023 15:00)  T(F): 98 (02 Jan 2023 11:00), Max: 99 (01 Jan 2023 15:00)  HR: 101 (02 Jan 2023 14:07) (81 - 120)  BP: 116/90 (02 Jan 2023 11:00) (92/67 - 140/98)  BP(mean): 99 (02 Jan 2023 11:00) (73 - 116)  RR: 20 (02 Jan 2023 11:00) (16 - 25)  SpO2: 99% (02 Jan 2023 14:07) (99% - 100%)    Parameters below as of 02 Jan 2023 11:00  Patient On (Oxygen Delivery Method): room air      I&O's Summary    01 Jan 2023 07:01  -  02 Jan 2023 07:00  --------------------------------------------------------  IN: 692.8 mL / OUT: 945 mL / NET: -252.2 mL    02 Jan 2023 07:01  -  02 Jan 2023 14:35  --------------------------------------------------------  IN: 120 mL / OUT: 0 mL / NET: 120 mL        PHYSICAL EXAM:  GENERAL: NAD, AAOx1  HEAD:  Atraumatic, Normocephalic  EYES: EOMI; conjunctiva and sclera clear  NECK: Supple, No JVD, No LAD  CHEST/LUNG: biltaeral air entry   HEART: Regular rate and rhythm; No murmurs, rubs, or gallops  ABDOMEN: Soft, Nontender, Nondistended; Bowel sounds present  EXTREMITIES:  2+ Peripheral Pulses, No clubbing, cyanosis, or edema  SKIN: No rashes or lesions      LABS:                        9.6    11.12 )-----------( 165      ( 01 Jan 2023 21:25 )             28.9     01-01    137  |  101  |  42<H>  ----------------------------<  156<H>  4.5   |  25  |  0.97    Ca    8.6      01 Jan 2023 21:25  Phos  2.3     01-01  Mg     2.2     01-01    TPro  6.6  /  Alb  3.3  /  TBili  0.7  /  DBili  x   /  AST  27  /  ALT  21  /  AlkPhos  73  12-31      CAPILLARY BLOOD GLUCOSE      POCT Blood Glucose.: 180 mg/dL (02 Jan 2023 12:12)  POCT Blood Glucose.: 144 mg/dL (02 Jan 2023 08:10)  POCT Blood Glucose.: 155 mg/dL (01 Jan 2023 22:42)  POCT Blood Glucose.: 199 mg/dL (01 Jan 2023 17:23)            RADIOLOGY & ADDITIONAL TESTS:    Imaging Personally Reviewed:  [x] YES  [ ] NO    Consultant(s) Notes Reviewed:  [x] YES  [ ] NO      MEDICATIONS  (STANDING):  albuterol/ipratropium for Nebulization 3 milliLiter(s) Nebulizer every 4 hours  bisacodyl 5 milliGRAM(s) Oral at bedtime  buDESOnide    Inhalation Suspension 0.25 milliGRAM(s) Inhalation every 12 hours  chlorhexidine 4% Liquid 1 Application(s) Topical daily  enoxaparin Injectable 40 milliGRAM(s) SubCutaneous <User Schedule>  folic acid 1 milliGRAM(s) Oral daily  insulin lispro (ADMELOG) corrective regimen sliding scale   SubCutaneous Before meals and at bedtime  metoprolol tartrate 25 milliGRAM(s) Oral every 12 hours  polyethylene glycol 3350 17 Gram(s) Oral every 12 hours  pregabalin 100 milliGRAM(s) Oral every 8 hours  senna 2 Tablet(s) Oral at bedtime  sodium chloride 3%  Inhalation 4 milliLiter(s) Inhalation every 12 hours  tamsulosin 0.4 milliGRAM(s) Oral at bedtime    MEDICATIONS  (PRN):  acetaminophen     Tablet .. 650 milliGRAM(s) Oral every 6 hours PRN Temp greater or equal to 38C (100.4F), Mild Pain (1 - 3)  traMADol 25 milliGRAM(s) Oral every 4 hours PRN Moderate Pain (4 - 6)      Care Discussed with Consultants/Other Providers [x] YES  [ ] NO    HEALTH ISSUES - PROBLEM Dx:  Compression fracture of L2    Hematoma    Dementia    Hypertension    Chronic atrial fibrillation    Diabetes mellitus    Hyperlipemia    Medication management    Preoperative clearance

## 2023-01-02 NOTE — PROGRESS NOTE ADULT - SUBJECTIVE AND OBJECTIVE BOX
NSCU Progress Note    Assessment/Hospital Course:        24 Hour Events/Subjective:  - POD2 L1-L4 percutaneous fusion  - prbc 1 unit overnight  - hypotensive with transient farshad requirements; off farshad overnight      REVIEW OF SYSTEMS:  - negative except as above    VITALS:   - Reviwed      IMAGING/DATA:   - Reviewed          PHYSICAL EXAM:    General: calm  CVS: RRR  Pulm: CTAB  GI: Soft, NTND  Extremities: No LE Edema  Neuro: AOx2 name and place(dementia), FC, CROSS, BUE distally AG 4/5, LLE distally AG, DF/PF 5/5 bilat , no annette, no clonus, Mosotho speaking   NSCU Progress Note    Assessment/Hospital Course:        24 Hour Events/Subjective:  - POD2 L1-L4 percutaneous fusion  - hypotensive with transient farshad requirements; off farshad overnight  - pending floor bed      REVIEW OF SYSTEMS:  - negative except as above    VITALS:   - Reviwed      IMAGING/DATA:   - Reviewed          PHYSICAL EXAM:    General: calm  CVS: RRR  Pulm: CTAB  GI: Soft, NTND  Extremities: No LE Edema  Neuro: AOx2 name and place(dementia), FC, CROSS, BUE distally AG 4/5, LLE distally AG, DF/PF 5/5 bilat , no annette, no clonus, Kinyarw[place] speaking

## 2023-01-02 NOTE — PROGRESS NOTE ADULT - ATTENDING COMMENTS
Agree with plan as outlined above.  Known AF w rapid rates following procedure  Increase blockade for now                                                                         Forty-1 Minutes Spent in Patient Management

## 2023-01-02 NOTE — PROVIDER CONTACT NOTE (OTHER) - ASSESSMENT
Pt A&Ox1. . otherwise VSS.
Pt has no active s/s hyperglycemia. 2x blood glucose >200.
Pt. c/o pain/screaming when moved/facial grimace
pt more sleepy than prior documented assessment. pt warm to touch and febrile to 101.5 rectal. pt in afib HR from 90s-110s. abdomen is severely distended. pt coughing frequently requiring deep suction-green sputum suctioned. unable to perform deep breathing.

## 2023-01-02 NOTE — PROGRESS NOTE ADULT - SUBJECTIVE AND OBJECTIVE BOX
Patient seen and examined at bedside.    Overnight Events: Hrs 90-110s Afib.     Review Of Systems: No chest pain, shortness of breath, or palpitations            Current Meds:  acetaminophen     Tablet .. 650 milliGRAM(s) Oral every 6 hours PRN  albuterol/ipratropium for Nebulization 3 milliLiter(s) Nebulizer every 4 hours  buDESOnide    Inhalation Suspension 0.25 milliGRAM(s) Inhalation every 12 hours  chlorhexidine 4% Liquid 1 Application(s) Topical daily  enoxaparin Injectable 40 milliGRAM(s) SubCutaneous <User Schedule>  folic acid 1 milliGRAM(s) Oral daily  insulin lispro (ADMELOG) corrective regimen sliding scale   SubCutaneous Before meals and at bedtime  metoprolol tartrate 25 milliGRAM(s) Oral every 12 hours  pregabalin 100 milliGRAM(s) Oral every 8 hours  sodium chloride 3%  Inhalation 4 milliLiter(s) Inhalation every 12 hours  tamsulosin 0.4 milliGRAM(s) Oral at bedtime  traMADol 25 milliGRAM(s) Oral every 4 hours PRN      Vitals:  T(F): 98.2 (), Max: 99 ()  HR: 112 () (81 - 120)  BP: 125/58 () (89/47 - 143/121)  RR: 17 ()  SpO2: 99% ()  I&O's Summary    2023 07:01  -  2023 07:00  --------------------------------------------------------  IN: 692.8 mL / OUT: 945 mL / NET: -252.2 mL    2023 07:01  -  2023 09:01  --------------------------------------------------------  IN: 120 mL / OUT: 0 mL / NET: 120 mL        Physical Exam:  Appearance: No acute distress; well appearing  Eyes: PERRL, EOMI, pink conjunctiva  HEENT: Normal oral mucosa  Cardiovascular: Irregular, tachycardic, S1, S2, no murmurs, rubs, or gallops; no edema; no JVD  Respiratory: Clear to auscultation bilaterally  Gastrointestinal: soft, non-tender, non-distended with normal bowel sounds  Musculoskeletal: No clubbing; no joint deformity   Neurologic: Non-focal  Psychiatry: AAOx3, mood & affect appropriate  Skin: No rashes, ecchymoses, or cyanosis                          9.6    11.12 )-----------( 165      ( 2023 21:25 )             28.9         137  |  101  |  42<H>  ----------------------------<  156<H>  4.5   |  25  |  0.97    Ca    8.6      2023 21:25  Phos  2.3       Mg     2.2         TPro  6.6  /  Alb  3.3  /  TBili  0.7  /  DBili  x   /  AST  27  /  ALT  21  /  AlkPhos  73  12-31      CARDIAC MARKERS ( 2023 10:23 )  34 ng/L / x     / x     / x     / x     / x          Serum Pro-Brain Natriuretic Peptide: 3994 pg/mL ( @ 12:02)    EC22: AF w/ PVC's vs aberrant beat, normal axis, normal intervals, nonspecific T wave changes    Echo:  TTE 22:  Conclusions:  1. Mitral annular calcification, otherwise normal mitral  valve. Minimal mitral regurgitation.  2. Moderately dilated left atrium.  LA volume index = 42  cc/m2.  3. Normal left ventricular internal dimensions and wall  thicknesses.  4. Beronica  left ventricular systolic dysfunction.  Endocardial visualization enhanced with intravenous  injection of Ultrasonic Enhancing Agent (Definity). No left  ventricular thrombus. LVEF calculated using biplane  Schultz's method is 62%. No wall motion abnormalities.  5. Unable to determine diastolic function due to an  irregular rhythm.  6. Normal right atrium.  7. Normal right ventricular size and function.  8. Normal tricuspid valve. No tricuspid regurgitation.  9. Estimated pulmonary artery systolic pressure equals 44  mm Hg, assuming right atrial pressure equals 8 mm Hg,  consistent with mild pulmonary pressures.  10. No pericardial effusion seen.  *** No previous Echo exam.       Patient seen and examined at bedside.    Overnight Events: No acute events. Family at bedside.    Tele Afib 80-100s, PVCs, atul       Current Meds:  acetaminophen     Tablet .. 650 milliGRAM(s) Oral every 6 hours PRN  albuterol/ipratropium for Nebulization 3 milliLiter(s) Nebulizer every 4 hours  buDESOnide    Inhalation Suspension 0.25 milliGRAM(s) Inhalation every 12 hours  chlorhexidine 4% Liquid 1 Application(s) Topical daily  enoxaparin Injectable 40 milliGRAM(s) SubCutaneous <User Schedule>  folic acid 1 milliGRAM(s) Oral daily  insulin lispro (ADMELOG) corrective regimen sliding scale   SubCutaneous Before meals and at bedtime  metoprolol tartrate 25 milliGRAM(s) Oral every 12 hours  pregabalin 100 milliGRAM(s) Oral every 8 hours  sodium chloride 3%  Inhalation 4 milliLiter(s) Inhalation every 12 hours  tamsulosin 0.4 milliGRAM(s) Oral at bedtime  traMADol 25 milliGRAM(s) Oral every 4 hours PRN      Vitals:  T(F): 98.2 (), Max: 99 ()  HR: 112 () (81 - 120)  BP: 125/58 () (89/47 - 143/121)  RR: 17 ()  SpO2: 99% ()  I&O's Summary    2023 07:01  -  2023 07:00  --------------------------------------------------------  IN: 692.8 mL / OUT: 945 mL / NET: -252.2 mL    2023 07:  -  2023 09:01  --------------------------------------------------------  IN: 120 mL / OUT: 0 mL / NET: 120 mL        Physical Exam:  Appearance: No acute distress  Eyes: EOMI, pink conjunctiva  HEENT: Normal oral mucosa  Cardiovascular: Irregular, tachycardic, S1, S2, no murmurs, rubs, or gallops; no edema; no JVD  Respiratory: Clear to auscultation bilaterally  Gastrointestinal: soft, non-tender, non-distended with normal bowel sounds  Musculoskeletal: No clubbing; no joint deformity   Neurologic: Non-focal  Psychiatry: AAOx3, mood & affect appropriate  Skin: No rashes, ecchymoses, or cyanosis                          9.6    11.12 )-----------( 165      ( 2023 21:25 )             28.9         137  |  101  |  42<H>  ----------------------------<  156<H>  4.5   |  25  |  0.97    Ca    8.6      2023 21:25  Phos  2.3       Mg     2.2         TPro  6.6  /  Alb  3.3  /  TBili  0.7  /  DBili  x   /  AST  27  /  ALT  21  /  AlkPhos  73  12-31      CARDIAC MARKERS ( 2023 10:23 )  34 ng/L / x     / x     / x     / x     / x          Serum Pro-Brain Natriuretic Peptide: 3994 pg/mL ( @ 12:02)    EC22: AF w/ PVC's vs aberrant beat, normal axis, normal intervals, nonspecific T wave changes    Echo:  TTE 22:  Conclusions:  1. Mitral annular calcification, otherwise normal mitral  valve. Minimal mitral regurgitation.  2. Moderately dilated left atrium.  LA volume index = 42  cc/m2.  3. Normal left ventricular internal dimensions and wall  thicknesses.  4. Beronica  left ventricular systolic dysfunction.  Endocardial visualization enhanced with intravenous  injection of Ultrasonic Enhancing Agent (Definity). No left  ventricular thrombus. LVEF calculated using biplane  Schultz's method is 62%. No wall motion abnormalities.  5. Unable to determine diastolic function due to an  irregular rhythm.  6. Normal right atrium.  7. Normal right ventricular size and function.  8. Normal tricuspid valve. No tricuspid regurgitation.  9. Estimated pulmonary artery systolic pressure equals 44  mm Hg, assuming right atrial pressure equals 8 mm Hg,  consistent with mild pulmonary pressures.  10. No pericardial effusion seen.  *** No previous Echo exam.

## 2023-01-02 NOTE — PROGRESS NOTE ADULT - ASSESSMENT
ASSESSMENT:   lumbar burst frcature s/p Fusion, spine, thoracolumbar, posterior approach    NEURO:  [id number]  Surgical drains per NSGY, Pain control  cont megan, aricept  prn tramadol  Activity: [] OOB as tolerated [] Bedrest [x] PT [x] OT [] PMNR    PULM:   cont duonebs  NC  Incentive spirometry, mobilize as tolerated    CV:   EF 62%  resume xarelto when ok w nsgy for afib  Keep -160mmHg  cont meoprolol   ef 62% on 12/30    RENAL:   cont flomax  IVL     GI:  THICK LIQUIDS and repeat S&S (pt coughing w water)  GI prophylaxis [x] not indicated [] PPI [] other:  Bowel regimen     ENDO: ISS  cont statin  a1c 6.8  Goal euglycemia (-180)    HEME/ONC:  SCDs  SQl  monitor Lt Psoas Hematoma  CBC tonight, monitor h/h s/p transfusion     ID:  Milena-op antibiotics ancef  monitor for fever (PNA)     ASSESSMENT:   lumbar burst frcature s/p Fusion, spine, thoracolumbar, posterior approach    NEURO:  [id number]  Surgical drains per NSGY, Pain control  cont megan, aricept  prn tramadol  Activity: [] OOB as tolerated [] Bedrest [x] PT [x] OT [] PMNR    PULM:   cont duonebs  NC, wean to ra  Incentive spirometry, mobilize as tolerated    CV:   EF 62%  resume xarelto when ok w nsgy for afib  Keep -160mmHg  cont meoprolol   ef 62% on 12/30    RENAL:   cont flomax  IVL     GI:  THICK LIQUIDS and repeat S&S (pt coughing w water)  GI prophylaxis [x] not indicated [] PPI [] other:  Bowel regimen add dulcolax  last bm PTA    ENDO: ISS  cont statin  a1c 6.8  Goal euglycemia (-180)    HEME/ONC:  SCDs  SQl  monitor Lt Psoas Hematoma  CBC tonight, monitor h/h s/p transfusion     ID:  Milena-op antibiotics ancef  monitor for fever (PNA)    Dispo: floor

## 2023-01-02 NOTE — PROGRESS NOTE ADULT - TIME BILLING
I saw and evaluated patient and agree with above note  has lumbar spine fracture  reviewed with spine team requesting strict spine precautions for assumed unstable spine  needs MRI and may need surgery  asking for SICU evaluation
lumbar surgery

## 2023-01-02 NOTE — PROGRESS NOTE ADULT - ASSESSMENT
88yo M h/o dementia, Afib on Xarelto, BPH, HTN, HLD, DM2 presenting as transfer from [facility] after mechanical fall. Pt found to have L2 fracture, and small associated L psoas hematoma.    Compression fracture of L2.   s/p L1-L4 percutaneous fusion  afvss, pain control, bowel regimen   post op pt, ot , active t & s, currently hgb stable no need for transfusion  dvt ppx -lovenox,,   -plan per primary team    Pulm edema 2/2 Afib rvr / Acute hfpef   -per family pt has chronic wheezing (not as bad as current hospitalization), no history of  copd or asthma;  possibly some underlying reactive airway dz  -cxr noted, pro bnp elevated,  -no history of smoking, copd or asthma  -afib controlled  -tte normal lvef no wma; mild valvular dz  -s/p lasix   -C/w nebs and Pulmicort per NSICU    Hematoma.   Found to have large hematoma within expanded left psoas muscle extending into the left iliac is muscle.   Trend h/h   AC on hold; when safe to use should be on full dose AC    Dementia.   -C/w aricept 10mg and namenda 5mg bid.    Hypertension.   C/w metoprolol;  lisinopril on hold    Chronic atrial fibrillation.  started on metoprolol   should be on full dose AC if ok with nsgy , currently on lovenox 40 mg daily; when safe to use    Diabetes mellitus.   - A1C 6.8   C/w SS  Takes metformin 500mg at home   DM well controlled, PMD follow up for medicaiton optimization, would not discharge on metformin.    Hyperlipemia.   Simvastatin 10mg.     PCP Dr. Shasta Victor  [phone]  Home meds: donepezil 10mg, sertraline 25mg, metformin 500mg qd, xaretlo 20mg, memantine 5mg bid, flomax 0.4mg, lisinopril 5mg, simvastatin 10mg.

## 2023-01-02 NOTE — PROGRESS NOTE ADULT - ASSESSMENT
87M w/ hx of dementia, AF (on AC), HTN, presented initially after a fall, found to have L2 fracture and RP hematoma. POD day #2 L1-L4 percutaneous fusion. Tranfused 1u pRBC overnight and briefly on Amilcar. Cardiology called for episodes of PVCs, Afib, bigeminy.  87M w/ hx of dementia, AF (on AC), HTN, presented initially after a fall, found to have L2 fracture and RP hematoma. POD day #2 L1-L4 percutaneous fusion. Transfused 1u pRBC overnight and briefly on Amilcar. Cardiology called for episodes of PVCs, Afib, bigeminy.    1. pAfib, PVCs, bigeminy  - Increase frequency of Metoprolol Tartrate 25mg to q8h, goal HR < 110  - Can use Metoprolol 5mg IV prn for elevated HRs above 130  - Resume anticoagulation once cleared from surgery perspective  - Keep K > 4, Mag > 2  - Monitor on Telemetry

## 2023-01-02 NOTE — PROGRESS NOTE ADULT - ATTENDING COMMENTS
s/p lumbar fusion.  [facility]  d/w NSG -- ok to xfer to floor when bed available.

## 2023-01-02 NOTE — PROGRESS NOTE ADULT - SUBJECTIVE AND OBJECTIVE BOX
Patient seen and examined    T(C): 36.8 (01-02-23 @ 15:00), Max: 37.2 (01-01-23 @ 19:00)  HR: 101 (01-02-23 @ 14:07) (81 - 112)  BP: 116/90 (01-02-23 @ 11:00) (102/62 - 140/98)  RR: 20 (01-02-23 @ 11:00) (16 - 22)  SpO2: 99% (01-02-23 @ 14:07) (99% - 100%)  01-01-23 @ 07:01  -  01-02-23 @ 07:00  --------------------------------------------------------  IN: 692.8 mL / OUT: 945 mL / NET: -252.2 mL    01-02-23 @ 07:01 - 01-02-23 @ 18:20  --------------------------------------------------------  IN: 360 mL / OUT: 0 mL / NET: 360 mL    acetaminophen     Tablet .. 650 milliGRAM(s) Oral every 6 hours PRN  albuterol/ipratropium for Nebulization 3 milliLiter(s) Nebulizer every 4 hours  bisacodyl 5 milliGRAM(s) Oral at bedtime  buDESOnide    Inhalation Suspension 0.25 milliGRAM(s) Inhalation every 12 hours  chlorhexidine 4% Liquid 1 Application(s) Topical daily  enoxaparin Injectable 40 milliGRAM(s) SubCutaneous <User Schedule>  folic acid 1 milliGRAM(s) Oral daily  insulin lispro (ADMELOG) corrective regimen sliding scale   SubCutaneous Before meals and at bedtime  metoprolol tartrate 25 milliGRAM(s) Oral every 8 hours  polyethylene glycol 3350 17 Gram(s) Oral every 12 hours  pregabalin 100 milliGRAM(s) Oral every 8 hours  senna 2 Tablet(s) Oral at bedtime  sodium chloride 3%  Inhalation 4 milliLiter(s) Inhalation every 12 hours  tamsulosin 0.4 milliGRAM(s) Oral at bedtime  traMADol 25 milliGRAM(s) Oral every 4 hours PRN        Exam stable    labs and imaging reviewed     Continue same management     Enid Moura NSCU attending     not critical

## 2023-01-02 NOTE — PROVIDER CONTACT NOTE (OTHER) - BACKGROUND
Pt admitted 12/28 s/p lumbar fx, in SICU for q1h neuro checks. A1C 6.8, previous glucose from 1716 was 228.
See H & P
s/p fall lumbar surgery
see H&P

## 2023-01-03 LAB
ANION GAP SERPL CALC-SCNC: 11 MMOL/L — SIGNIFICANT CHANGE UP (ref 5–17)
BUN SERPL-MCNC: 47 MG/DL — HIGH (ref 7–23)
CALCIUM SERPL-MCNC: 8.5 MG/DL — SIGNIFICANT CHANGE UP (ref 8.4–10.5)
CHLORIDE SERPL-SCNC: 105 MMOL/L — SIGNIFICANT CHANGE UP (ref 96–108)
CO2 SERPL-SCNC: 24 MMOL/L — SIGNIFICANT CHANGE UP (ref 22–31)
CREAT SERPL-MCNC: 0.93 MG/DL — SIGNIFICANT CHANGE UP (ref 0.5–1.3)
EGFR: 79 ML/MIN/1.73M2 — SIGNIFICANT CHANGE UP
GLUCOSE BLDC GLUCOMTR-MCNC: 159 MG/DL — HIGH (ref 70–99)
GLUCOSE BLDC GLUCOMTR-MCNC: 167 MG/DL — HIGH (ref 70–99)
GLUCOSE BLDC GLUCOMTR-MCNC: 170 MG/DL — HIGH (ref 70–99)
GLUCOSE BLDC GLUCOMTR-MCNC: 175 MG/DL — HIGH (ref 70–99)
GLUCOSE BLDC GLUCOMTR-MCNC: 179 MG/DL — HIGH (ref 70–99)
GLUCOSE SERPL-MCNC: 170 MG/DL — HIGH (ref 70–99)
HCT VFR BLD CALC: 29.4 % — LOW (ref 39–50)
HGB BLD-MCNC: 9.3 G/DL — LOW (ref 13–17)
MAGNESIUM SERPL-MCNC: 2.2 MG/DL — SIGNIFICANT CHANGE UP (ref 1.6–2.6)
MCHC RBC-ENTMCNC: 29.9 PG — SIGNIFICANT CHANGE UP (ref 27–34)
MCHC RBC-ENTMCNC: 31.6 GM/DL — LOW (ref 32–36)
MCV RBC AUTO: 94.5 FL — SIGNIFICANT CHANGE UP (ref 80–100)
NRBC # BLD: 0 /100 WBCS — SIGNIFICANT CHANGE UP (ref 0–0)
PHOSPHATE SERPL-MCNC: 3 MG/DL — SIGNIFICANT CHANGE UP (ref 2.5–4.5)
PLATELET # BLD AUTO: 161 K/UL — SIGNIFICANT CHANGE UP (ref 150–400)
POTASSIUM SERPL-MCNC: 3.9 MMOL/L — SIGNIFICANT CHANGE UP (ref 3.5–5.3)
POTASSIUM SERPL-SCNC: 3.9 MMOL/L — SIGNIFICANT CHANGE UP (ref 3.5–5.3)
RAPID RVP RESULT: SIGNIFICANT CHANGE UP
RBC # BLD: 3.11 M/UL — LOW (ref 4.2–5.8)
RBC # FLD: 15.4 % — HIGH (ref 10.3–14.5)
SARS-COV-2 RNA SPEC QL NAA+PROBE: SIGNIFICANT CHANGE UP
SODIUM SERPL-SCNC: 140 MMOL/L — SIGNIFICANT CHANGE UP (ref 135–145)
WBC # BLD: 6.38 K/UL — SIGNIFICANT CHANGE UP (ref 3.8–10.5)
WBC # FLD AUTO: 6.38 K/UL — SIGNIFICANT CHANGE UP (ref 3.8–10.5)

## 2023-01-03 RX ORDER — LACTULOSE 10 G/15ML
20 SOLUTION ORAL ONCE
Refills: 0 | Status: COMPLETED | OUTPATIENT
Start: 2023-01-03 | End: 2023-01-03

## 2023-01-03 RX ADMIN — Medication 2: at 11:48

## 2023-01-03 RX ADMIN — Medication 2: at 17:05

## 2023-01-03 RX ADMIN — PIPERACILLIN AND TAZOBACTAM 25 GRAM(S): 4; .5 INJECTION, POWDER, LYOPHILIZED, FOR SOLUTION INTRAVENOUS at 05:47

## 2023-01-03 RX ADMIN — Medication 1 MILLIGRAM(S): at 11:48

## 2023-01-03 RX ADMIN — Medication 3 MILLILITER(S): at 05:47

## 2023-01-03 RX ADMIN — Medication 3 MILLILITER(S): at 14:01

## 2023-01-03 RX ADMIN — POLYETHYLENE GLYCOL 3350 17 GRAM(S): 17 POWDER, FOR SOLUTION ORAL at 05:47

## 2023-01-03 RX ADMIN — Medication 0.25 MILLIGRAM(S): at 17:29

## 2023-01-03 RX ADMIN — Medication 0.25 MILLIGRAM(S): at 05:47

## 2023-01-03 RX ADMIN — Medication 100 MILLIGRAM(S): at 14:50

## 2023-01-03 RX ADMIN — Medication 100 MILLIGRAM(S): at 05:47

## 2023-01-03 RX ADMIN — SODIUM CHLORIDE 4 MILLILITER(S): 9 INJECTION INTRAMUSCULAR; INTRAVENOUS; SUBCUTANEOUS at 05:48

## 2023-01-03 RX ADMIN — Medication 3 MILLILITER(S): at 09:10

## 2023-01-03 RX ADMIN — PIPERACILLIN AND TAZOBACTAM 25 GRAM(S): 4; .5 INJECTION, POWDER, LYOPHILIZED, FOR SOLUTION INTRAVENOUS at 14:03

## 2023-01-03 RX ADMIN — Medication 25 MILLIGRAM(S): at 22:13

## 2023-01-03 RX ADMIN — Medication 25 MILLIGRAM(S): at 05:47

## 2023-01-03 RX ADMIN — PIPERACILLIN AND TAZOBACTAM 25 GRAM(S): 4; .5 INJECTION, POWDER, LYOPHILIZED, FOR SOLUTION INTRAVENOUS at 00:02

## 2023-01-03 RX ADMIN — SODIUM CHLORIDE 4 MILLILITER(S): 9 INJECTION INTRAMUSCULAR; INTRAVENOUS; SUBCUTANEOUS at 17:31

## 2023-01-03 RX ADMIN — SENNA PLUS 2 TABLET(S): 8.6 TABLET ORAL at 22:12

## 2023-01-03 RX ADMIN — Medication 3 MILLILITER(S): at 22:12

## 2023-01-03 RX ADMIN — Medication 650 MILLIGRAM(S): at 20:28

## 2023-01-03 RX ADMIN — Medication 650 MILLIGRAM(S): at 09:00

## 2023-01-03 RX ADMIN — TAMSULOSIN HYDROCHLORIDE 0.4 MILLIGRAM(S): 0.4 CAPSULE ORAL at 22:13

## 2023-01-03 RX ADMIN — PIPERACILLIN AND TAZOBACTAM 25 GRAM(S): 4; .5 INJECTION, POWDER, LYOPHILIZED, FOR SOLUTION INTRAVENOUS at 22:55

## 2023-01-03 RX ADMIN — Medication 650 MILLIGRAM(S): at 08:01

## 2023-01-03 RX ADMIN — LACTULOSE 20 GRAM(S): 10 SOLUTION ORAL at 14:01

## 2023-01-03 RX ADMIN — Medication 2: at 08:04

## 2023-01-03 RX ADMIN — Medication 3 MILLILITER(S): at 01:48

## 2023-01-03 RX ADMIN — Medication 2: at 22:13

## 2023-01-03 RX ADMIN — Medication 5 MILLIGRAM(S): at 14:02

## 2023-01-03 RX ADMIN — Medication 25 MILLIGRAM(S): at 14:01

## 2023-01-03 RX ADMIN — Medication 3 MILLILITER(S): at 17:29

## 2023-01-03 RX ADMIN — Medication 650 MILLIGRAM(S): at 19:58

## 2023-01-03 RX ADMIN — ENOXAPARIN SODIUM 40 MILLIGRAM(S): 100 INJECTION SUBCUTANEOUS at 17:31

## 2023-01-03 RX ADMIN — Medication 100 MILLIGRAM(S): at 22:13

## 2023-01-03 NOTE — CONSULT NOTE ADULT - ASSESSMENT
Full consult pending  88y male with hx dementia, afib, BPH, HTN, HLD, and DM2 who presented as a transfer from [facility] after mechanical fall. He was found to have L2 fracture and associated L psoas hematoma. Admitted to [facility] 12/28, now s/p L1-L4 percutaneous fusion. Post op pulm edema 2/2 Afib rvr / Acute CHF, s/p lasix, nebs. He also found to have large hematoma within expanded left psoas muscle extending into the left iliacus muscle. He had fevers and hence ID eval requested. He is currently on IV Zosyn. Pt is unable to offer any specific c/o. He has been constipated as per family, no  symptoms, but had cough and SOB and felt warm   etiology of fever likely multifactorial possible due to atelectasis, constipation and large Left RP hematoma  he is also at risk for nosocomial PNA    SUGGEST:  Agree with empiric Zosyn for now  f/u cultures   aspiration precautions  if cont to spike, may need to consider dedicated CT chest/abd, repeat Resp PCR  d/w pt's son at bedside

## 2023-01-03 NOTE — PROGRESS NOTE ADULT - ASSESSMENT
86yo M h/o dementia, Afib on Xarelto, BPH, HTN, HLD, DM2 presenting as transfer from [facility] after mechanical fall. Pt found to have L2 fracture, and small associated L psoas hematoma.    Compression fracture of L2.   s/p L1-L4 percutaneous fusion  afvss, pain control, bowel regimen   post op pt, ot , active t & s, currently hgb stable no need for transfusion  dvt ppx -lovenox,,   -plan per primary team    Pulm edema 2/2 Afib rvr / Acute hfpef   -per family pt has chronic wheezing), no history of  copd or asthma;  possibly some underlying reactive airway dz  -cxr noted, pro bnp elevated,  -no history of smoking, copd or asthma  -afib controlled  -tte normal lvef no wma; mild valvular dz  -s/p lasix   -C/w nebs and Pulmicort per NSICU    Hematoma.   Found to have large hematoma within expanded left psoas muscle extending into the left iliac is muscle.   Trend h/h   AC on hold; when safe to use should be on full dose AC    Dementia.   -C/w aricept 10mg and namenda 5mg bid.    Hypertension.   C/w metoprolol;  lisinopril on hold    Chronic atrial fibrillation.  started on metoprolol   should be on full dose AC if ok with nsgy , currently on lovenox 40 mg daily; when safe to use    Diabetes mellitus.   - A1C 6.8   C/w SS  Takes metformin 500mg at home   DM well controlled, PMD follow up for medicaiton optimization, would not discharge on metformin.    Hyperlipemia.   Simvastatin 10mg.     PCP Dr. Shasta Victor  [phone]  Home meds: donepezil 10mg, sertraline 25mg, metformin 500mg qd, xaretlo 20mg, memantine 5mg bid, flomax 0.4mg, lisinopril 5mg, simvastatin 10mg.

## 2023-01-03 NOTE — CONSULT NOTE ADULT - SUBJECTIVE AND OBJECTIVE BOX
HPI:   Patient is a 88y male with hx dementia, afib, BPH, HTN, HLD, and DM2 who presented as a transfer from [facility] after mechanical fall. He was found to have L2 fracture and associated L psoas hematoma. Admitted to [facility] , now s/p L1-L4 percutaneous fusion. Post op pulm edema 2/2 Afib rvr / Acute CHF, s/p lasix, nebs. He also found to have large hematoma within expanded left psoas muscle extending into the left iliac is muscle. He had fevers and hence ID eval requested. He is currently on IV Zosyn.   REVIEW OF SYSTEMS:  All other review of systems negative (Comprehensive ROS)    PAST MEDICAL & SURGICAL HISTORY:  HTN (hypertension)      Prostate cancer        Hx of radiation therapy        Cataract of left eye      Borderline diabetes      Kidney cyst, acquired  left      Atrial fibrillation      Cervical disc displacement      Obesity (BMI 30-39.9)      Cataract extraction status of left eye      Larynx polyp  Excision of larynx polyp;           Allergies    aspirin (Rash)    Intolerances        Antimicrobials Day #    piperacillin/tazobactam IVPB.. 3.375 Gram(s) IV Intermittent every 8 hours    Other Medications:  acetaminophen     Tablet .. 650 milliGRAM(s) Oral every 6 hours PRN  albuterol/ipratropium for Nebulization 3 milliLiter(s) Nebulizer every 4 hours  bisacodyl 5 milliGRAM(s) Oral every 12 hours  buDESOnide    Inhalation Suspension 0.25 milliGRAM(s) Inhalation every 12 hours  enoxaparin Injectable 40 milliGRAM(s) SubCutaneous <User Schedule>  folic acid 1 milliGRAM(s) Oral daily  insulin lispro (ADMELOG) corrective regimen sliding scale   SubCutaneous Before meals and at bedtime  metoprolol tartrate 25 milliGRAM(s) Oral every 8 hours  polyethylene glycol 3350 17 Gram(s) Oral every 12 hours  pregabalin 100 milliGRAM(s) Oral every 8 hours  senna 2 Tablet(s) Oral at bedtime  sodium chloride 3%  Inhalation 4 milliLiter(s) Inhalation every 12 hours  tamsulosin 0.4 milliGRAM(s) Oral at bedtime  traMADol 25 milliGRAM(s) Oral every 4 hours PRN      FAMILY HISTORY:  FH: dementia (Mother)        SOCIAL HISTORY:  Smoking:     ETOH:     Drug Use:     Single     T(F): 98.6 (23 @ 13:02), Max: 101.5 (23 @ 19:45)  HR: 85 (23 @ 13:02)  BP: 119/68 (23 @ 13:02)  RR: 19 (23 @ 13:02)  SpO2: 95% (23 @ 13:02)  Wt(kg): --    PHYSICAL EXAM:  General: alert, no acute distress  Eyes:  anicteric, no conjunctival injection, no discharge  Oropharynx: no lesions or injection 	  Neck: supple, without adenopathy  Lungs: clear to auscultation  Heart: regular rate and rhythm; no murmur, rubs or gallops  Abdomen: soft, nondistended, nontender, without mass or organomegaly  Skin: no lesions  Extremities: no clubbing, cyanosis, or edema  Neurologic: alert, oriented, moves all extremities    LAB RESULTS:                        9.3    6.38  )-----------( 161      ( 2023 04:54 )             29.4         140  |  105  |  47<H>  ----------------------------<  170<H>  3.9   |  24  |  0.93    Ca    8.5      2023 04:55  Phos  3.0       Mg     2.2             Urinalysis Basic - ( 2023 22:28 )    Color: Yellow / Appearance: Clear / S.029 / pH: x  Gluc: x / Ketone: Negative  / Bili: Negative / Urobili: 2 mg/dL   Blood: x / Protein: Trace / Nitrite: Negative   Leuk Esterase: Negative / RBC: 6 /hpf / WBC 2 /HPF   Sq Epi: x / Non Sq Epi: 0 /hpf / Bacteria: Negative        MICROBIOLOGY REVIEWED:    RADIOLOGY REVIEWED:  < from: Xray Abdomen 1 View PORTABLE -Urgent (Xray Abdomen 1 View PORTABLE -Urgent .) (23 @ 20:59) >  IMPRESSION:  Gaseous distention of the stomach. Otherwise, nonobstructive bowel gas   pattern.    < end of copied text >  < from: Xray Chest 1 View- PORTABLE-Urgent (Xray Chest 1 View- PORTABLE-Urgent .) (23 @ 20:59) >  IMPRESSION:  Clear lungs.    < end of copied text >   HPI:   Patient is a 88y male with hx dementia, afib, BPH, HTN, HLD, and DM2 who presented as a transfer from [facility] after mechanical fall. He was found to have L2 fracture and associated L psoas hematoma. Admitted to [facility] , now s/p L1-L4 percutaneous fusion. Post op pulm edema 2/2 Afib rvr / Acute CHF, s/p lasix, nebs. He also found to have large hematoma within expanded left psoas muscle extending into the left iliacus muscle. He had fevers and hence ID eval requested. He is currently on IV Zosyn. Pt is unable to offer any specific c/o. He has been constipated as per family, no  symptoms, but had cough and SOB and felt warm   REVIEW OF SYSTEMS:  All other review of systems negative (Comprehensive ROS)    PAST MEDICAL & SURGICAL HISTORY:  HTN (hypertension)      Prostate cancer        Hx of radiation therapy        Cataract of left eye      Borderline diabetes      Kidney cyst, acquired  left      Atrial fibrillation      Cervical disc displacement      Obesity (BMI 30-39.9)      Cataract extraction status of left eye      Larynx polyp  Excision of larynx polyp;           Allergies    aspirin (Rash)    Intolerances        Antimicrobials Day #    piperacillin/tazobactam IVPB.. 3.375 Gram(s) IV Intermittent every 8 hours    Other Medications:  acetaminophen     Tablet .. 650 milliGRAM(s) Oral every 6 hours PRN  albuterol/ipratropium for Nebulization 3 milliLiter(s) Nebulizer every 4 hours  bisacodyl 5 milliGRAM(s) Oral every 12 hours  buDESOnide    Inhalation Suspension 0.25 milliGRAM(s) Inhalation every 12 hours  enoxaparin Injectable 40 milliGRAM(s) SubCutaneous <User Schedule>  folic acid 1 milliGRAM(s) Oral daily  insulin lispro (ADMELOG) corrective regimen sliding scale   SubCutaneous Before meals and at bedtime  metoprolol tartrate 25 milliGRAM(s) Oral every 8 hours  polyethylene glycol 3350 17 Gram(s) Oral every 12 hours  pregabalin 100 milliGRAM(s) Oral every 8 hours  senna 2 Tablet(s) Oral at bedtime  sodium chloride 3%  Inhalation 4 milliLiter(s) Inhalation every 12 hours  tamsulosin 0.4 milliGRAM(s) Oral at bedtime  traMADol 25 milliGRAM(s) Oral every 4 hours PRN      FAMILY HISTORY:  FH: dementia (Mother)        SOCIAL HISTORY:  Smoking:     ETOH:     Drug Use:     Single     T(F): 98.6 (23 @ 13:02), Max: 101.5 (23 @ 19:45)  HR: 85 (23 @ 13:02)  BP: 119/68 (23 @ 13:02)  RR: 19 (23 @ 13:02)  SpO2: 95% (23 @ 13:02)  Wt(kg): --    PHYSICAL EXAM:  General: alert, no acute distress, but chronically ill appearing  Eyes:  anicteric, no conjunctival injection, no discharge  Oropharynx: no lesions or injection 	  Neck: supple, without adenopathy  Lungs: diminished at bases  Heart: regular rate and rhythm; no murmur, rubs or gallops  Abdomen: soft, distended, nontender, without mass or organomegaly  Skin: no lesions, back incision clean, drain in place  Extremities: no clubbing, cyanosis, or edema  Neurologic: alert, follows simple commands    LAB RESULTS:                        9.3    6.38  )-----------( 161      ( 2023 04:54 )             29.4     01-    140  |  105  |  47<H>  ----------------------------<  170<H>  3.9   |  24  |  0.93    Ca    8.5      2023 04:55  Phos  3.0     -  Mg     2.2     -03        Urinalysis Basic - ( 2023 22:28 )    Color: Yellow / Appearance: Clear / S.029 / pH: x  Gluc: x / Ketone: Negative  / Bili: Negative / Urobili: 2 mg/dL   Blood: x / Protein: Trace / Nitrite: Negative   Leuk Esterase: Negative / RBC: 6 /hpf / WBC 2 /HPF   Sq Epi: x / Non Sq Epi: 0 /hpf / Bacteria: Negative        MICROBIOLOGY REVIEWED:    RADIOLOGY REVIEWED:  < from: Xray Abdomen 1 View PORTABLE -Urgent (Xray Abdomen 1 View PORTABLE -Urgent .) (23 @ 20:59) >  IMPRESSION:  Gaseous distention of the stomach. Otherwise, nonobstructive bowel gas   pattern.    < end of copied text >  < from: Xray Chest 1 View- PORTABLE-Urgent (Xray Chest 1 View- PORTABLE-Urgent .) (23 @ 20:59) >  IMPRESSION:  Clear lungs.    < end of copied text >  < from: CT Pelvis No Cont (22 @ 15:23) >   Large hematoma is seen within expanded LEFT   psoas muscle extending into the LEFT iliac is muscle. Stranding in the   LEFT retroperitoneum consistent with mild retroperitoneal hemorrhage.    < end of copied text >

## 2023-01-03 NOTE — PROGRESS NOTE ADULT - SUBJECTIVE AND OBJECTIVE BOX
Patient is a 88y old  Male who presents with a chief complaint of Transfer level 2 trauma (2023 09:20)      SUBJECTIVE / OVERNIGHT EVENTS:  Patient seen and examined,.   Resting comfortably.      Vital Signs Last 24 Hrs  T(C): 37 (2023 13:02), Max: 38.6 (2023 19:45)  T(F): 98.6 (2023 13:02), Max: 101.5 (2023 19:45)  HR: 85 (2023 13:02) (85 - 112)  BP: 119/68 (2023 13:02) (103/66 - 135/87)  BP(mean): 9 (2023 08:42) (9 - 84)  RR: 19 (2023 13:02) (19 - 22)  SpO2: 95% (2023 13:02) (95% - 100%)    Parameters below as of 2023 13:02  Patient On (Oxygen Delivery Method): room air      I&O's Summary    2023 07:01  -  2023 07:00  --------------------------------------------------------  IN: 480 mL / OUT: 1300 mL / NET: -820 mL    2023 07:01  -  2023 14:09  --------------------------------------------------------  IN: 240 mL / OUT: 0 mL / NET: 240 mL        PHYSICAL EXAM:  GENERAL: NAD, AAOx1  HEAD:  Atraumatic, Normocephalic  EYES: EOMI; conjunctiva and sclera clear  NECK: Supple, No JVD, No LAD  CHEST/LUNG: B/L air entry; No wheezes, rales or rhonci   HEART: Regular rate and rhythm; No murmurs, rubs, or gallops  ABDOMEN: Soft, Nontender, Nondistended; Bowel sounds present  EXTREMITIES:  2+ Peripheral Pulses, No clubbing, cyanosis, or edema  SKIN: No rashes or lesions    LABS:                        9.3    6.38  )-----------( 161      ( 2023 04:54 )             29.4         140  |  105  |  47<H>  ----------------------------<  170<H>  3.9   |  24  |  0.93    Ca    8.5      2023 04:55  Phos  3.0       Mg     2.2             CAPILLARY BLOOD GLUCOSE      POCT Blood Glucose.: 175 mg/dL (2023 11:10)  POCT Blood Glucose.: 167 mg/dL (2023 07:43)  POCT Blood Glucose.: 181 mg/dL (2023 21:23)  POCT Blood Glucose.: 184 mg/dL (2023 17:23)        Urinalysis Basic - ( 2023 22:28 )    Color: Yellow / Appearance: Clear / S.029 / pH: x  Gluc: x / Ketone: Negative  / Bili: Negative / Urobili: 2 mg/dL   Blood: x / Protein: Trace / Nitrite: Negative   Leuk Esterase: Negative / RBC: 6 /hpf / WBC 2 /HPF   Sq Epi: x / Non Sq Epi: 0 /hpf / Bacteria: Negative        RADIOLOGY & ADDITIONAL TESTS:    Imaging Personally Reviewed:  [x] YES  [ ] NO    Consultant(s) Notes Reviewed:  [x] YES  [ ] NO      MEDICATIONS  (STANDING):  albuterol/ipratropium for Nebulization 3 milliLiter(s) Nebulizer every 4 hours  bisacodyl 5 milliGRAM(s) Oral every 12 hours  buDESOnide    Inhalation Suspension 0.25 milliGRAM(s) Inhalation every 12 hours  enoxaparin Injectable 40 milliGRAM(s) SubCutaneous <User Schedule>  folic acid 1 milliGRAM(s) Oral daily  insulin lispro (ADMELOG) corrective regimen sliding scale   SubCutaneous Before meals and at bedtime  metoprolol tartrate 25 milliGRAM(s) Oral every 8 hours  piperacillin/tazobactam IVPB.. 3.375 Gram(s) IV Intermittent every 8 hours  polyethylene glycol 3350 17 Gram(s) Oral every 12 hours  pregabalin 100 milliGRAM(s) Oral every 8 hours  senna 2 Tablet(s) Oral at bedtime  sodium chloride 3%  Inhalation 4 milliLiter(s) Inhalation every 12 hours  tamsulosin 0.4 milliGRAM(s) Oral at bedtime    MEDICATIONS  (PRN):  acetaminophen     Tablet .. 650 milliGRAM(s) Oral every 6 hours PRN Temp greater or equal to 38C (100.4F), Mild Pain (1 - 3)  traMADol 25 milliGRAM(s) Oral every 4 hours PRN Moderate Pain (4 - 6)      Care Discussed with Consultants/Other Providers [x] YES  [ ] NO    HEALTH ISSUES - PROBLEM Dx:  Compression fracture of L2    Hematoma    Dementia    Hypertension    Chronic atrial fibrillation    Diabetes mellitus    Hyperlipemia    Medication management    Preoperative clearance

## 2023-01-03 NOTE — PROGRESS NOTE ADULT - SUBJECTIVE AND OBJECTIVE BOX
HPI:  TRAUMA SURGERY CONSULT NOTE  --------------------------------------------------------------------------------------------    TRAUMA ACTIVATION LEVEL:     MECHANISM OF INJURY:      [X] Blunt  	[] MVC	[X] Fall	[] Pedestrian Struck	[] Motorcycle accident      [] Penetrating  	[] Gun Shot Wound 		[] Stab Wound    GCS: 	E: 4	V: 4	M: 6    Patient is a 87y old  Male who presents with a chief complaint of mechanical fall    HPI:   88yo M h/o dementia, Afib on Xarelto, presenting as transfer from [facility] after mechanical fall. Pt had a ground level fall yesterday, and was transferred here for higher level of care.  Got [facility] at [facility]    Primary Survey:  A - airway intact  B - bilateral breath sounds and good chest rise  C - initial BP  BP: 134/62 (22 @ 18:32) *** , HR HR: 98 (22 @ 18:32) *** , Afib palpable pulses in all extremities  D - GCS 14 on arrival (confused)  Exposure obtained      Secondary Survey:  GEN: resting comfortably in bed, in NAD  HEENT: normocephalic, non-tender to palpation, no abrasions visible, no step-offs palpated  NECK: trachea midline, non-tender to palpation at posterior midline  CHEST: non-tender to palpation across clavicles and b/l anterior ribs  BACK: non-tender to palpation along cervical, thoracic, lumbar spine midline and b/l posterior ribs; no palpable step-offs or hematomas  ABD: soft, non-distended, non-tender   PELVIS: no pelvic instability  LUE: no visible abrasions or deformities, non-tender to palpation across upper and lower arm  RUE: no visible abrasions or deformities, non-tender to palpation across upper and lower arm  LLE: no visible abrasions or deformities, non-tender to palpation across upper and lower leg. Palpable PT  RLE: no visible abrasions or deformities, non-tender to palpation across upper and lower leg. Palpable PT  NEURO: AAOx0, CN II-IX grossly intact; pupils 3mm, equal and reactive to light bilaterally    ROS:  unable to obtain due to pt mental status     (28 Dec 2022 20:11)    OVERNIGHT EVENTS:  Vital Signs Last 24 Hrs  T(C): 37.1 (2023 08:42), Max: 38.6 (2023 19:45)  T(F): 98.7 (2023 08:42), Max: 101.5 (2023 19:45)  HR: 100 (2023 08:42) (87 - 112)  BP: 111/66 (2023 08:42) (103/66 - 135/87)  BP(mean): 9 (2023 08:42) (9 - 99)  RR: 21 (2023 08:42) (20 - 22)  SpO2: 95% (2023 08:42) (95% - 100%)    Parameters below as of 2023 08:42  Patient On (Oxygen Delivery Method): room air        I&O's Detail    2023 07:01  -  2023 07:00  --------------------------------------------------------  IN:    Oral Fluid: 480 mL  Total IN: 480 mL    OUT:    Accordian (mL): 100 mL    Intermittent Catheterization - Urethral (mL): 1200 mL  Total OUT: 1300 mL    Total NET: -820 mL        I&O's Summary    2023 07:01  -  2023 07:00  --------------------------------------------------------  IN: 480 mL / OUT: 1300 mL / NET: -820 mL        PHYSICAL EXAM:  Neurological:    Motor exam:         [] Upper extremity                 D             B          T          WE       WF      HI                                               R         5/5        5/5        5/5       5/5     5/5       5/5                                               L          5/5        5/5        5/5       5/5     5/5       5/5         [] Lower extremity               HF            KF        KE         EHL        FHL                                               R        5/5        5/5        5/5       5/5         5/5                                               L         5/5        5/5       5/5       5/5          5/5                                                        [] warm well perfused; capillary refill <3 seconds     Sensation: [] intact to light touch  [] decreased:       Gait    Cardiovascular: Regular  Respiratory: Clear bilaterally  Gastrointestinal: Abd soft + BS non tender  Genitourinary:  Extremities: -C/C/E  Incision/Wound: Intact    TUBES/LINES:  [] CVC  [] A-line  [] Lumbar Drain  [] Ventriculostomy  [] Other    DIET:  [] NPO  [] Mechanical  [] Tube feeds    LABS:                        9.3    6.38  )-----------( 161      ( 2023 04:54 )             29.4     01-03    140  |  105  |  47<H>  ----------------------------<  170<H>  3.9   |  24  |  0.93    Ca    8.5      2023 04:55  Phos  3.0     -  Mg     2.2             Urinalysis Basic - ( 2023 22:28 )    Color: Yellow / Appearance: Clear / S.029 / pH: x  Gluc: x / Ketone: Negative  / Bili: Negative / Urobili: 2 mg/dL   Blood: x / Protein: Trace / Nitrite: Negative   Leuk Esterase: Negative / RBC: 6 /hpf / WBC 2 /HPF   Sq Epi: x / Non Sq Epi: 0 /hpf / Bacteria: Negative          CAPILLARY BLOOD GLUCOSE      POCT Blood Glucose.: 167 mg/dL (2023 07:43)  POCT Blood Glucose.: 181 mg/dL (2023 21:23)  POCT Blood Glucose.: 184 mg/dL (2023 17:23)  POCT Blood Glucose.: 180 mg/dL (2023 12:12)          Drug Levels: [] N/A    CSF Analysis: [] N/A      Allergies    aspirin (Rash)    Intolerances      MEDICATIONS:  Antibiotics:  piperacillin/tazobactam IVPB.. 3.375 Gram(s) IV Intermittent every 8 hours    Neuro:  acetaminophen     Tablet .. 650 milliGRAM(s) Oral every 6 hours PRN  pregabalin 100 milliGRAM(s) Oral every 8 hours  traMADol 25 milliGRAM(s) Oral every 4 hours PRN    Anticoagulation:  enoxaparin Injectable 40 milliGRAM(s) SubCutaneous <User Schedule>    OTHER:  albuterol/ipratropium for Nebulization 3 milliLiter(s) Nebulizer every 4 hours  bisacodyl 5 milliGRAM(s) Oral at bedtime  buDESOnide    Inhalation Suspension 0.25 milliGRAM(s) Inhalation every 12 hours  insulin lispro (ADMELOG) corrective regimen sliding scale   SubCutaneous Before meals and at bedtime  metoprolol tartrate 25 milliGRAM(s) Oral every 8 hours  polyethylene glycol 3350 17 Gram(s) Oral every 12 hours  senna 2 Tablet(s) Oral at bedtime  sodium chloride 3%  Inhalation 4 milliLiter(s) Inhalation every 12 hours  tamsulosin 0.4 milliGRAM(s) Oral at bedtime    IVF:  folic acid 1 milliGRAM(s) Oral daily    CULTURES:    RADIOLOGY & ADDITIONAL TESTS:      ASSESSMENT:  HPI:  TRAUMA SURGERY CONSULT NOTE  --------------------------------------------------------------------------------------------    TRAUMA ACTIVATION LEVEL:     MECHANISM OF INJURY:      [X] Blunt  	[] MVC	[X] Fall	[] Pedestrian Struck	[] Motorcycle accident      [] Penetrating  	[] Gun Shot Wound 		[] Stab Wound    GCS: 	E: 4	V: 4	M: 6    Patient is a 87y old  Male who presents with a chief complaint of mechanical fall    HPI:   88yo M h/o dementia, Afib on Xarelto, presenting as transfer from [facility] after mechanical fall. Pt had a ground level fall yesterday, and was transferred here for higher level of care.  Got [facility] at [facility]    Primary Survey:  A - airway intact  B - bilateral breath sounds and good chest rise  C - initial BP  BP: 134/62 (22 @ 18:32) *** , HR HR: 98 (22 @ 18:32) *** , Afib palpable pulses in all extremities  D - GCS 14 on arrival (confused)  Exposure obtained      Secondary Survey:  GEN: resting comfortably in bed, in NAD  HEENT: normocephalic, non-tender to palpation, no abrasions visible, no step-offs palpated  NECK: trachea midline, non-tender to palpation at posterior midline  CHEST: non-tender to palpation across clavicles and b/l anterior ribs  BACK: non-tender to palpation along cervical, thoracic, lumbar spine midline and b/l posterior ribs; no palpable step-offs or hematomas  ABD: soft, non-distended, non-tender   PELVIS: no pelvic instability  LUE: no visible abrasions or deformities, non-tender to palpation across upper and lower arm  RUE: no visible abrasions or deformities, non-tender to palpation across upper and lower arm  LLE: no visible abrasions or deformities, non-tender to palpation across upper and lower leg. Palpable PT  RLE: no visible abrasions or deformities, non-tender to palpation across upper and lower leg. Palpable PT  NEURO: AAOx0, CN II-IX grossly intact; pupils 3mm, equal and reactive to light bilaterally    ROS:  unable to obtain due to pt mental status     (28 Dec 2022 20:11)    88y Male s/p    PLAN:  NEURO:    CARDIOVASCULAR:  Hemodynamically stable    PULMONARY:  Keep O2 sat > 94%  Continue incentive spirometer    RENAL:  Voidind  IVL    GI:  Tolerating diet  Bowel regimen    HEME:  H/H stable    ID: Afebrile    ENDO:    DVT PROPHYLAXIS:  [x] Venodynes                                [x] Heparin/Lovenox    FALL RISK:  [x] Low Risk                                    [] Impulsive      Patient is a 87y old  Male who presents with a chief complaint of mechanical fall. Follow up Ct of Lumbar spine revealed a L2 chalk stick fracture     Patient is POD # 3 s/p L1-4 percutaneous screws placement    OVERNIGHT EVENTS:  Vital Signs Last 24 Hrs  T(C): 37.1 (2023 08:42), Max: 38.6 (2023 19:45)  T(F): 98.7 (2023 08:42), Max: 101.5 (2023 19:45)  HR: 100 (2023 08:42) (87 - 112)  BP: 111/66 (2023 08:42) (103/66 - 135/87)  BP(mean): 9 (2023 08:42) (9 - 99)  RR: 21 (2023 08:42) (20 - 22)  SpO2: 95% (2023 08:42) (95% - 100%)    Parameters below as of 2023 08:42  Patient On (Oxygen Delivery Method): room air        I&O's Detail    2023 07:01  -  2023 07:00  --------------------------------------------------------  IN:    Oral Fluid: 480 mL  Total IN: 480 mL    OUT:    Accordian (mL): 100 mL    Intermittent Catheterization - Urethral (mL): 1200 mL  Total OUT: 1300 mL    Total NET: -820 mL        I&O's Summary    2023 07:01  -  2023 07:00  --------------------------------------------------------  IN: 480 mL / OUT: 1300 mL / NET: -820 mL        PHYSICAL EXAM:  Neurological:    Motor exam:         [] Upper extremity                 D             B          T          WE       WF      HI                                               R         5/5        5/5        5/5       5/5     5/5       5/5                                               L          5/5        5/5        5/5       5/5     5/5       5/5         [] Lower extremity               HF            KF        KE         EHL        FHL                                               R        5/5        5/5        5/5       5/5         5/5                                               L         5/5        5/5       5/5       5/5          5/5                                                        [] warm well perfused; capillary refill <3 seconds     Sensation: [] intact to light touch  [] decreased:       Gait    Cardiovascular: Regular  Respiratory: Clear bilaterally  Gastrointestinal: Abd soft + BS non tender  Genitourinary:  Extremities: -C/C/E  Incision/Wound: Intact    TUBES/LINES:  [] CVC  [] A-line  [] Lumbar Drain  [] Ventriculostomy  [] Other    DIET:  [] NPO  [] Mechanical  [] Tube feeds    LABS:                        9.3    6.38  )-----------( 161      ( 2023 04:54 )             29.4     01    140  |  105  |  47<H>  ----------------------------<  170<H>  3.9   |  24  |  0.93    Ca    8.5      2023 04:55  Phos  3.0       Mg     2.2             Urinalysis Basic - ( 2023 22:28 )    Color: Yellow / Appearance: Clear / S.029 / pH: x  Gluc: x / Ketone: Negative  / Bili: Negative / Urobili: 2 mg/dL   Blood: x / Protein: Trace / Nitrite: Negative   Leuk Esterase: Negative / RBC: 6 /hpf / WBC 2 /HPF   Sq Epi: x / Non Sq Epi: 0 /hpf / Bacteria: Negative          CAPILLARY BLOOD GLUCOSE      POCT Blood Glucose.: 167 mg/dL (2023 07:43)  POCT Blood Glucose.: 181 mg/dL (2023 21:23)  POCT Blood Glucose.: 184 mg/dL (2023 17:23)  POCT Blood Glucose.: 180 mg/dL (2023 12:12)          Drug Levels: [] N/A    CSF Analysis: [] N/A      Allergies    aspirin (Rash)    Intolerances      MEDICATIONS:  Antibiotics:  piperacillin/tazobactam IVPB.. 3.375 Gram(s) IV Intermittent every 8 hours    Neuro:  acetaminophen     Tablet .. 650 milliGRAM(s) Oral every 6 hours PRN  pregabalin 100 milliGRAM(s) Oral every 8 hours  traMADol 25 milliGRAM(s) Oral every 4 hours PRN    Anticoagulation:  enoxaparin Injectable 40 milliGRAM(s) SubCutaneous <User Schedule>    OTHER:  albuterol/ipratropium for Nebulization 3 milliLiter(s) Nebulizer every 4 hours  bisacodyl 5 milliGRAM(s) Oral at bedtime  buDESOnide    Inhalation Suspension 0.25 milliGRAM(s) Inhalation every 12 hours  insulin lispro (ADMELOG) corrective regimen sliding scale   SubCutaneous Before meals and at bedtime  metoprolol tartrate 25 milliGRAM(s) Oral every 8 hours  polyethylene glycol 3350 17 Gram(s) Oral every 12 hours  senna 2 Tablet(s) Oral at bedtime  sodium chloride 3%  Inhalation 4 milliLiter(s) Inhalation every 12 hours  tamsulosin 0.4 milliGRAM(s) Oral at bedtime    IVF:  folic acid 1 milliGRAM(s) Oral daily    CULTURES:    RADIOLOGY & ADDITIONAL TESTS:              Patient is a 87y old  Male who presents with a chief complaint of mechanical fall. Follow up Ct of Lumbar spine revealed a L2 chalk stick fracture. patient was taken to the OR on  and had    L1-4 percutaneous screws placement    OVERNIGHT EVENTS: no acute event    Vital Signs Last 24 Hrs  T(C): 37.1 (2023 08:42), Max: 38.6 (2023 19:45)  T(F): 98.7 (2023 08:42), Max: 101.5 (2023 19:45)  HR: 100 (2023 08:42) (87 - 112)  BP: 111/66 (2023 08:42) (103/66 - 135/87)  BP(mean): 9 (2023 08:42) (9 - 99)  RR: 21 (2023 08:42) (20 - 22)  SpO2: 95% (2023 08:42) (95% - 100%)    Parameters below as of 2023 08:42  Patient On (Oxygen Delivery Method): room air        I&O's Detail    2023 07:01  -  2023 07:00  --------------------------------------------------------  IN:    Oral Fluid: 480 mL  Total IN: 480 mL    OUT:    Accordian (mL): 100 mL    Intermittent Catheterization - Urethral (mL): 1200 mL  Total OUT: 1300 mL    Total NET: -820 mL        I&O's Summary    2023 07:01  -  2023 07:00  --------------------------------------------------------  IN: 480 mL / OUT: 1300 mL / NET: -820 mL        PHYSICAL EXAM:  Neurological: Alert awake, oriented to self answer some questions correctly in Bangl[place] as per son, able to follow simple commands    Motor exam:         [x] Upper extremity                 D             B          T          WE       WF      HI                                               R      4/5      4/5        4/5       4/5      4/5        4/5                                               L        4/5      4/5       4/5     4/5    4/5     4/5         [x] Lower extremity               HF            KF        KE         EHL        FHL                                               R        3/5       3/5        3/5       5/5         5/5                                               L        3/5       3/5      3/5       5/5          5/5                                                      Sensation: [] intact to light touch  [] decreased:       Gait    Cardiovascular: Regular  Respiratory: Clear bilaterally  Gastrointestinal: Abd soft + BS non tender  Genitourinary:  Extremities: -C/C/E  Incision/Wound: Intact    TUBES/LINES:  [] CVC  [] A-line  [] Lumbar Drain  [] Ventriculostomy  [] Other    DIET: CCD diet  [] NPO  [] Mechanical  [] Tube feeds    LABS:                        9.3    6.38  )-----------( 161      ( 2023 04:54 )             29.4     01-03    140  |  105  |  47<H>  ----------------------------<  170<H>  3.9   |  24  |  0.93    Ca    8.5      2023 04:55  Phos  3.0     01-03  Mg     2.2     01-03        Urinalysis Basic - ( 2023 22:28 )    Color: Yellow / Appearance: Clear / S.029 / pH: x  Gluc: x / Ketone: Negative  / Bili: Negative / Urobili: 2 mg/dL   Blood: x / Protein: Trace / Nitrite: Negative   Leuk Esterase: Negative / RBC: 6 /hpf / WBC 2 /HPF   Sq Epi: x / Non Sq Epi: 0 /hpf / Bacteria: Negative          CAPILLARY BLOOD GLUCOSE      POCT Blood Glucose.: 167 mg/dL (2023 07:43)  POCT Blood Glucose.: 181 mg/dL (2023 21:23)  POCT Blood Glucose.: 184 mg/dL (2023 17:23)  POCT Blood Glucose.: 180 mg/dL (2023 12:12)          Drug Levels: [] N/A    CSF Analysis: [] N/A      Allergies    aspirin (Rash)    Intolerances      MEDICATIONS:  Antibiotics:  piperacillin/tazobactam IVPB.. 3.375 Gram(s) IV Intermittent every 8 hours    Neuro:  acetaminophen     Tablet .. 650 milliGRAM(s) Oral every 6 hours PRN  pregabalin 100 milliGRAM(s) Oral every 8 hours  traMADol 25 milliGRAM(s) Oral every 4 hours PRN    Anticoagulation:  enoxaparin Injectable 40 milliGRAM(s) SubCutaneous <User Schedule>    OTHER:  albuterol/ipratropium for Nebulization 3 milliLiter(s) Nebulizer every 4 hours  bisacodyl 5 milliGRAM(s) Oral at bedtime  buDESOnide    Inhalation Suspension 0.25 milliGRAM(s) Inhalation every 12 hours  insulin lispro (ADMELOG) corrective regimen sliding scale   SubCutaneous Before meals and at bedtime  metoprolol tartrate 25 milliGRAM(s) Oral every 8 hours  polyethylene glycol 3350 17 Gram(s) Oral every 12 hours  senna 2 Tablet(s) Oral at bedtime  sodium chloride 3%  Inhalation 4 milliLiter(s) Inhalation every 12 hours  tamsulosin 0.4 milliGRAM(s) Oral at bedtime    IVF:  folic acid 1 milliGRAM(s) Oral daily    CULTURES:    RADIOLOGY & ADDITIONAL TESTS:            Patient is a 87y old  Male who presents with a chief complaint of mechanical fall. Follow up Ct of Lumbar spine revealed a L2 chalk stick fracture. patient was taken to the OR on  and had   L1-4 percutaneous screws placement    OVERNIGHT EVENTS: no acute event    Vital Signs Last 24 Hrs  T(C): 37.1 (2023 08:42), Max: 38.6 (2023 19:45)  T(F): 98.7 (2023 08:42), Max: 101.5 (2023 19:45)  HR: 100 (2023 08:42) (87 - 112)  BP: 111/66 (2023 08:42) (103/66 - 135/87)  BP(mean): 9 (2023 08:42) (9 - 99)  RR: 21 (2023 08:42) (20 - 22)  SpO2: 95% (2023 08:42) (95% - 100%)    Parameters below as of 2023 08:42  Patient On (Oxygen Delivery Method): room air        I&O's Detail    2023 07:01  -  2023 07:00  --------------------------------------------------------  IN:    Oral Fluid: 480 mL  Total IN: 480 mL    OUT:    Accordian (mL): 100 mL    Intermittent Catheterization - Urethral (mL): 1200 mL  Total OUT: 1300 mL    Total NET: -820 mL        I&O's Summary    2023 07:01  -  2023 07:00  --------------------------------------------------------  IN: 480 mL / OUT: 1300 mL / NET: -820 mL        PHYSICAL EXAM:  Neurological: Alert awake, oriented to self answer some questions correctly in [place]isw[place]ili as per son, able to follow simple commands    Motor exam:         [x] Upper extremity                 D             B          T          WE       WF      HI                                               R      4/5      4/5        4/5       4/5      4/5        4/5                                               L        4/5      4/5       4/5     4/5    4/5     4/5         [x] Lower extremity               HF            KF        KE         EHL        FHL                                               R        3/5       3/5        3/5       5/5         5/5                                               L        3/5       3/5      3/5       5/5          5/5                                                      Sensation: [] intact to light touch  [] decreased:       Gait    Cardiovascular: Regular  Respiratory: Clear bilaterally  Gastrointestinal: Abd soft + BS non tender  Genitourinary:  Extremities: -C/C/E  Incision/Wound: Intact    TUBES/LINES:  [] CVC  [] A-line  [] Lumbar Drain  [] Ventriculostomy  [] Other    DIET: CCD diet  [] NPO  [] Mechanical  [] Tube feeds    LABS:                        9.3    6.38  )-----------( 161      ( 2023 04:54 )             29.4     01-03    140  |  105  |  47<H>  ----------------------------<  170<H>  3.9   |  24  |  0.93    Ca    8.5      2023 04:55  Phos  3.0     01-03  Mg     2.2     01-03        Urinalysis Basic - ( 2023 22:28 )    Color: Yellow / Appearance: Clear / S.029 / pH: x  Gluc: x / Ketone: Negative  / Bili: Negative / Urobili: 2 mg/dL   Blood: x / Protein: Trace / Nitrite: Negative   Leuk Esterase: Negative / RBC: 6 /hpf / WBC 2 /HPF   Sq Epi: x / Non Sq Epi: 0 /hpf / Bacteria: Negative          CAPILLARY BLOOD GLUCOSE      POCT Blood Glucose.: 167 mg/dL (2023 07:43)  POCT Blood Glucose.: 181 mg/dL (2023 21:23)  POCT Blood Glucose.: 184 mg/dL (2023 17:23)  POCT Blood Glucose.: 180 mg/dL (2023 12:12)          Drug Levels: [] N/A    CSF Analysis: [] N/A      Allergies    aspirin (Rash)    Intolerances      MEDICATIONS:  Antibiotics:  piperacillin/tazobactam IVPB.. 3.375 Gram(s) IV Intermittent every 8 hours    Neuro:  acetaminophen     Tablet .. 650 milliGRAM(s) Oral every 6 hours PRN  pregabalin 100 milliGRAM(s) Oral every 8 hours  traMADol 25 milliGRAM(s) Oral every 4 hours PRN    Anticoagulation:  enoxaparin Injectable 40 milliGRAM(s) SubCutaneous <User Schedule>    OTHER:  albuterol/ipratropium for Nebulization 3 milliLiter(s) Nebulizer every 4 hours  bisacodyl 5 milliGRAM(s) Oral at bedtime  buDESOnide    Inhalation Suspension 0.25 milliGRAM(s) Inhalation every 12 hours  insulin lispro (ADMELOG) corrective regimen sliding scale   SubCutaneous Before meals and at bedtime  metoprolol tartrate 25 milliGRAM(s) Oral every 8 hours  polyethylene glycol 3350 17 Gram(s) Oral every 12 hours  senna 2 Tablet(s) Oral at bedtime  sodium chloride 3%  Inhalation 4 milliLiter(s) Inhalation every 12 hours  tamsulosin 0.4 milliGRAM(s) Oral at bedtime    IVF:  folic acid 1 milliGRAM(s) Oral daily    CULTURES:    RADIOLOGY & ADDITIONAL TESTS:            Patient is a 87y old  Male who presents with a chief complaint of mechanical fall. Follow up Ct of Lumbar spine revealed a L2 chalk stick fracture. patient was taken to the OR on  and had   L1-4 percutaneous screws placement    OVERNIGHT EVENTS: t MAX 38.6, fever w/u in progress    Vital Signs Last 24 Hrs  T(C): 37.1 (2023 08:42), Max: 38.6 (2023 19:45)  T(F): 98.7 (2023 08:42), Max: 101.5 (2023 19:45)  HR: 100 (2023 08:42) (87 - 112)  BP: 111/66 (2023 08:42) (103/66 - 135/87)  BP(mean): 9 (2023 08:42) (9 - 99)  RR: 21 (2023 08:42) (20 - 22)  SpO2: 95% (2023 08:42) (95% - 100%)    Parameters below as of 2023 08:42  Patient On (Oxygen Delivery Method): room air        I&O's Detail    2023 07:01  -  2023 07:00  --------------------------------------------------------  IN:    Oral Fluid: 480 mL  Total IN: 480 mL    OUT:    Accordian (mL): 100 mL    Intermittent Catheterization - Urethral (mL): 1200 mL  Total OUT: 1300 mL    Total NET: -820 mL        I&O's Summary    2023 07:01  -  2023 07:00  --------------------------------------------------------  IN: 480 mL / OUT: 1300 mL / NET: -820 mL        PHYSICAL EXAM:  Neurological: Alert awake, oriented to self answer some questions correctly in [place]ian as per son, able to follow simple commands    Motor exam:         [x] Upper extremity                 D             B          T          WE       WF      HI                                               R      4/5      4/5        4/5       4/5      4/5        4/5                                               L        4/5      4/5       4/5     4/5    4/5     4/5         [x] Lower extremity               HF            KF        KE         EHL        FHL                                               R        3/5       3/5        3/5       5/5         5/5                                               L        3/5       3/5      3/5       5/5          5/5                                                      Sensation: [] intact to light touch  [] decreased:       Gait    Cardiovascular: Regular  Respiratory: Clear bilaterally  Gastrointestinal: Abd soft + BS non tender  Genitourinary:  Extremities: -C/C/E  Incision/Wound: Intact    TUBES/LINES:  [] CVC  [] A-line  [] Lumbar Drain  [] Ventriculostomy  [] Other    DIET: CCD diet  [] NPO  [] Mechanical  [] Tube feeds    LABS:                        9.3    6.38  )-----------( 161      ( 2023 04:54 )             29.4     01-03    140  |  105  |  47<H>  ----------------------------<  170<H>  3.9   |  24  |  0.93    Ca    8.5      2023 04:55  Phos  3.0     01-03  Mg     2.2     01-03        Urinalysis Basic - ( 2023 22:28 )    Color: Yellow / Appearance: Clear / S.029 / pH: x  Gluc: x / Ketone: Negative  / Bili: Negative / Urobili: 2 mg/dL   Blood: x / Protein: Trace / Nitrite: Negative   Leuk Esterase: Negative / RBC: 6 /hpf / WBC 2 /HPF   Sq Epi: x / Non Sq Epi: 0 /hpf / Bacteria: Negative          CAPILLARY BLOOD GLUCOSE      POCT Blood Glucose.: 167 mg/dL (2023 07:43)  POCT Blood Glucose.: 181 mg/dL (2023 21:23)  POCT Blood Glucose.: 184 mg/dL (2023 17:23)  POCT Blood Glucose.: 180 mg/dL (2023 12:12)          Drug Levels: [] N/A    CSF Analysis: [] N/A      Allergies    aspirin (Rash)    Intolerances      MEDICATIONS:  Antibiotics:  piperacillin/tazobactam IVPB.. 3.375 Gram(s) IV Intermittent every 8 hours    Neuro:  acetaminophen     Tablet .. 650 milliGRAM(s) Oral every 6 hours PRN  pregabalin 100 milliGRAM(s) Oral every 8 hours  traMADol 25 milliGRAM(s) Oral every 4 hours PRN    Anticoagulation:  enoxaparin Injectable 40 milliGRAM(s) SubCutaneous <User Schedule>    OTHER:  albuterol/ipratropium for Nebulization 3 milliLiter(s) Nebulizer every 4 hours  bisacodyl 5 milliGRAM(s) Oral at bedtime  buDESOnide    Inhalation Suspension 0.25 milliGRAM(s) Inhalation every 12 hours  insulin lispro (ADMELOG) corrective regimen sliding scale   SubCutaneous Before meals and at bedtime  metoprolol tartrate 25 milliGRAM(s) Oral every 8 hours  polyethylene glycol 3350 17 Gram(s) Oral every 12 hours  senna 2 Tablet(s) Oral at bedtime  sodium chloride 3%  Inhalation 4 milliLiter(s) Inhalation every 12 hours  tamsulosin 0.4 milliGRAM(s) Oral at bedtime    IVF:  folic acid 1 milliGRAM(s) Oral daily    CULTURES:    RADIOLOGY & ADDITIONAL TESTS:

## 2023-01-03 NOTE — PROGRESS NOTE ADULT - ASSESSMENT
ASSESSMENT:   lumbar burst frcature s/p Fusion, spine, thoracolumbar, posterior approach    NEURO:  [id number]  Surgical drains per NSGY, Pain control  cont megan, aricept  prn tramadol  Activity: [] OOB as tolerated [] Bedrest [x] PT [x] OT [] PMNR    PULM:   cont duonebs  NC, wean to ra  Incentive spirometry, mobilize as tolerated    CV:   EF 62%  resume xarelto when ok w nsgy for afib  Keep -160mmHg  cont meoprolol   ef 62% on 12/30    RENAL:   cont flomax  IVL     GI:  THICK LIQUIDS and repeat S&S (pt coughing w water)  GI prophylaxis [x] not indicated [] PPI [] other:  Bowel regimen add dulcolax  last bm PTA    ENDO: ISS  cont statin  a1c 6.8  Goal euglycemia (-180)    HEME/ONC:  SCDs  SQl  monitor Lt Psoas Hematoma  CBC tonight, monitor h/h s/p transfusion     ID:  Milena-op antibiotics ancef  monitor for fever (PNA)    Dispo: floor    lumbar burst fracture s/p Fusion, spine, thoracolumbar, posterior approach L1-4 percutaneous screws placement    NEURO:  [id number]  Surgical drains per NSGY, Pain control  cont megan, aricept  prn tramadol  Activity: [] OOB as tolerated [] Bedrest [x] PT [x] OT [] PMNR    PULM:   cont duonebs  NC, wean to ra  Incentive spirometry, mobilize as tolerated    CV:   EF 62%  resume xarelto when ok w nsgy for afib  Keep -160mmHg  cont meoprolol   ef 62% on 12/30    RENAL:   cont flomax  IVL     GI:  THICK LIQUIDS and repeat S&S (pt coughing w water)  GI prophylaxis [x] not indicated [] PPI [] other:  Bowel regimen add dulcolax  last bm PTA    ENDO: ISS  cont statin  a1c 6.8  Goal euglycemia (-180)    HEME/ONC:  SCDs  SQl  monitor Lt Psoas Hematoma  CBC tonight, monitor h/h s/p transfusion     ID:  Milena-op antibiotics ancef  monitor for fever (PNA)    Dispo: floor    lumbar burst fracture s/p Fusion, spine, thoracolumbar, posterior approach L1-4 percutaneous screws placement    NEURO:  [id number]  Surgical drains per NSGY, Pain control  cont megan, aricept  prn tramadol  Activity: [x] Increase activity as tolerated  PT/OT followung    PULM:   cont duonebs  NC, wean to RA  Incentive spirometry, mobilize as tolerated    CV:   EF 62%  resume Xarelto when ok w nsgy for afib  Keep -160mmHg  cont meoprolol   ef 62% on 12/30    RENAL:   cont flomax  IVL     GI:  THICK LIQUIDS and repeat S&S (pt coughing w water)  GI prophylaxis [x] not indicated [] PPI [] other:  Bowel regimen add dulcolax  last bm PTA    ENDO: ISS  cont statin  a1c 6.8  Goal euglycemia (-180)    HEME/ONC:  SCDs  SQl  monitor Lt Psoas Hematoma  CBC tonight, monitor h/h s/p transfusion     ID:  Milena-op antibiotics ancef  monitor for fever (PNA)    Dispo: floor    lumbar burst fracture s/p Fusion, spine, thoracolumbar, posterior approach L1-4 percutaneous screws placement    NEURO:  [id number]  Surgical drains per NSGY, Pain control  cont megan, aricept  prn tramadol  Activity: [x] Increase activity as tolerated  PT/OT following    PULM:   cont duonebs  NC, wean to RA  Incentive spirometry, mobilize as tolerated    CV:   EF 62%  resume Xarelto when ok w nsgy for afib  Keep -160mmHg  cont meoprolol   ef 62% on 12/30    RENAL:   cont flomax  IVL     GI:  THICK LIQUIDS and repeat S&S (pt coughing w water)  GI prophylaxis [x] not indicated [] PPI [] other:  Bowel regimen miralax/ senna ,  dulcolax was added ATC  last bm PTA    ENDO: ISS  cont statin  a1c 6.8  Goal euglycemia (-180)    HEME/ONC:  SCDs  SQl  monitor Lt Psoas Hematoma  H/H post transfusion stable    ID:  Febrile to 38.6 w/u in progress  ID consult pend  Zosyn started prophylactically    Dispo: [facility]    Will discuss with Dr Hector  [phone]

## 2023-01-04 LAB
GLUCOSE BLDC GLUCOMTR-MCNC: 136 MG/DL — HIGH (ref 70–99)
GLUCOSE BLDC GLUCOMTR-MCNC: 151 MG/DL — HIGH (ref 70–99)
GLUCOSE BLDC GLUCOMTR-MCNC: 162 MG/DL — HIGH (ref 70–99)
GLUCOSE BLDC GLUCOMTR-MCNC: 166 MG/DL — HIGH (ref 70–99)

## 2023-01-04 RX ADMIN — SODIUM CHLORIDE 4 MILLILITER(S): 9 INJECTION INTRAMUSCULAR; INTRAVENOUS; SUBCUTANEOUS at 18:24

## 2023-01-04 RX ADMIN — ENOXAPARIN SODIUM 40 MILLIGRAM(S): 100 INJECTION SUBCUTANEOUS at 18:21

## 2023-01-04 RX ADMIN — Medication 3 MILLILITER(S): at 06:06

## 2023-01-04 RX ADMIN — Medication 3 MILLILITER(S): at 14:51

## 2023-01-04 RX ADMIN — Medication 2: at 16:42

## 2023-01-04 RX ADMIN — Medication 0.25 MILLIGRAM(S): at 18:21

## 2023-01-04 RX ADMIN — Medication 2: at 21:39

## 2023-01-04 RX ADMIN — Medication 100 MILLIGRAM(S): at 14:51

## 2023-01-04 RX ADMIN — Medication 5 MILLIGRAM(S): at 18:21

## 2023-01-04 RX ADMIN — Medication 25 MILLIGRAM(S): at 06:07

## 2023-01-04 RX ADMIN — Medication 100 MILLIGRAM(S): at 06:06

## 2023-01-04 RX ADMIN — SENNA PLUS 2 TABLET(S): 8.6 TABLET ORAL at 21:40

## 2023-01-04 RX ADMIN — Medication 0.25 MILLIGRAM(S): at 06:54

## 2023-01-04 RX ADMIN — Medication 3 MILLILITER(S): at 18:25

## 2023-01-04 RX ADMIN — Medication 3 MILLILITER(S): at 21:43

## 2023-01-04 RX ADMIN — PIPERACILLIN AND TAZOBACTAM 25 GRAM(S): 4; .5 INJECTION, POWDER, LYOPHILIZED, FOR SOLUTION INTRAVENOUS at 14:51

## 2023-01-04 RX ADMIN — TAMSULOSIN HYDROCHLORIDE 0.4 MILLIGRAM(S): 0.4 CAPSULE ORAL at 21:39

## 2023-01-04 RX ADMIN — Medication 3 MILLILITER(S): at 02:20

## 2023-01-04 RX ADMIN — Medication 3 MILLILITER(S): at 10:31

## 2023-01-04 RX ADMIN — PIPERACILLIN AND TAZOBACTAM 25 GRAM(S): 4; .5 INJECTION, POWDER, LYOPHILIZED, FOR SOLUTION INTRAVENOUS at 06:06

## 2023-01-04 RX ADMIN — POLYETHYLENE GLYCOL 3350 17 GRAM(S): 17 POWDER, FOR SOLUTION ORAL at 06:07

## 2023-01-04 RX ADMIN — Medication 25 MILLIGRAM(S): at 14:51

## 2023-01-04 RX ADMIN — Medication 5 MILLIGRAM(S): at 06:07

## 2023-01-04 RX ADMIN — Medication 25 MILLIGRAM(S): at 21:40

## 2023-01-04 RX ADMIN — Medication 100 MILLIGRAM(S): at 21:44

## 2023-01-04 RX ADMIN — PIPERACILLIN AND TAZOBACTAM 25 GRAM(S): 4; .5 INJECTION, POWDER, LYOPHILIZED, FOR SOLUTION INTRAVENOUS at 21:43

## 2023-01-04 RX ADMIN — POLYETHYLENE GLYCOL 3350 17 GRAM(S): 17 POWDER, FOR SOLUTION ORAL at 18:21

## 2023-01-04 RX ADMIN — SODIUM CHLORIDE 4 MILLILITER(S): 9 INJECTION INTRAMUSCULAR; INTRAVENOUS; SUBCUTANEOUS at 06:06

## 2023-01-04 RX ADMIN — Medication 2: at 11:40

## 2023-01-04 RX ADMIN — Medication 1 MILLIGRAM(S): at 14:51

## 2023-01-04 NOTE — CHART NOTE - NSCHARTNOTEFT_GEN_A_CORE
*full note to follow 86yo M h/o dementia, Afib on Xarelto, presenting as transfer from [facility] after mechanical fall, found to have L2 fracture, and small associated L psoas hematoma. 12/30 Pt planned for spine surgery for L2 fx. The patient went down to OR and was audibly wheezing and having somewhat laboured breathing. Given that this was an acute change and no pulmonary evaluation had been undertaken anesthesia aborted the case pending pulmonary evaluation/clearance. Pulm subsequently saw pt->Wheezing may have been secondary to mild volume overload. 12/31: pt s/p L1-L4 percutaneous fusion.    Pt seen for initial bedside swallow evaluation on 1/1, with recommendations for a puree diet with moderately thick liquids. Pt with congested cough at that time which made behavioral analysis of swallow function difficult to a degree.    Pt seen today for dysphagia follow-up. Pt encountered in bed, awake, on room air. Son-in- at bedside, who reports pt with good tolerance of current diet. Also reports pt with dysphagia symptoms PTA, with frequent coughing/ choking while drinking liquids; pt's MD had recommended utilizing 'thick-it' to thicken drinks. Pt with occasional congested cough at bedside.     Impressions: Pt presents with clinical signs of an oropharyngeal dysphagia     Recommendations:  1. Continue puree diet with moderately thick liquids   2. FEES (Fiberoptic Endoscopic Evaluation of Swallowing) to further assess swallow function and determine least restrictive diet for discharge   3. Monitor for s/s aspiration/laryngeal penetration. If noted:  D/C p.o. intake, provide non-oral nutrition/hydration/meds, and contact this service @ [phone]   4. Good oral care & suction PRN     Recs discussed with pt's son-inMIREYA lepe, and YON Ren.     Shauna Obrien, [facility]-SLP pgr #[phone] 86yo M h/o dementia, Afib on Xarelto, presenting as transfer from [facility] after mechanical fall, found to have L2 fracture, and small associated L psoas hematoma. 12/30 Pt planned for spine surgery for L2 fx. The patient went down to OR and was audibly wheezing and having somewhat laboured breathing. Given that this was an acute change and no pulmonary evaluation had been undertaken anesthesia aborted the case pending pulmonary evaluation/clearance. Pulm subsequently saw pt->Wheezing may have been secondary to mild volume overload. 12/31: pt s/p L1-L4 percutaneous fusion.    Pt seen for initial bedside swallow evaluation on 1/1, with recommendations for a puree diet with moderately thick liquids. Pt with congested cough at that time which made behavioral analysis of swallow function difficult to a degree.    Pt seen today for dysphagia follow-up. Pt encountered in bed, awake, on room air. Son-in-law at bedside, who reports pt with good tolerance of current diet. Also reports pt with dysphagia symptoms PTA, with frequent coughing/ choking while drinking liquids; pt's MD had recommended utilizing 'thick-it' to thicken drinks. Pt alert and pleasant, able to follow simple directives. Pt's family member translated into [place] as needed. Pt with occasional congested cough at bedside. Oral cavity clear and clean. Pt provided with PO trials of thick puree and moderately thick liquids via tsp/straw. Swallow function characterized by adequate orientation/ acceptance, adequate bolus transfer, and pharyngeal swallow trigger which is judged to be timely. Delayed, wet cough noted post PO trials, however, similar to baseline cough; subjective assessment of swallow is therefore confounded. Further PO trials deferred, will instead plan for instrumental exam. Pt's son-in-law in agreement. Pt left in NAD.     Impressions: Pt presents with clinical signs of an oropharyngeal dysphagia     Recommendations:  1. Continue puree diet with moderately thick liquids   2. FEES (Fiberoptic Endoscopic Evaluation of Swallowing) to further assess swallow function and determine least restrictive diet for discharge   3. Monitor for s/s aspiration/laryngeal penetration. If noted:  D/C p.o. intake, provide non-oral nutrition/hydration/meds, and contact this service @ [phone]   4. Good oral care & suction PRN     Recs discussed with pt's son-in-law, RN, and YON Ren.     Shauna Obrien, [facility]-SLP pgr #[phone]

## 2023-01-04 NOTE — PROGRESS NOTE ADULT - SUBJECTIVE AND OBJECTIVE BOX
CC: f/u for    Patient reports    REVIEW OF SYSTEMS: no fevers, no chills, no nausea, no vomiting, no abd pain, no resp symptoms  All other review of systems negative (Comprehensive ROS)    Antimicrobials Day # 2-3  piperacillin/tazobactam IVPB.. 3.375 Gram(s) IV Intermittent every 8 hours    Other Medications Reviewed    T(F): 98.8 (23 @ 04:53), Max: 98.8 (23 @ 04:53)  HR: 97 (23 @ 04:53)  BP: 119/68 (23 @ 04:53)  RR: 18 (23 @ 04:53)  SpO2: 96% (23 @ 04:53)    PHYSICAL EXAM:  General: alert, no acute distress  Eyes:  anicteric, no conjunctival injection, no discharge  Oropharynx: no lesions or injection 	  Neck: supple, without adenopathy  Lungs: clear to auscultation  Heart: regular rate and rhythm; no murmur, rubs or gallops  Abdomen: soft, nondistended, nontender, without mass or organomegaly  Skin: no lesions  Extremities: no clubbing, cyanosis, or edema  Neurologic: alert, oriented, moves all extremities    LAB RESULTS:                        9.3    6.38  )-----------( 161      ( 2023 04:54 )             29.4         140  |  105  |  47<H>  ----------------------------<  170<H>  3.9   |  24  |  0.93    Ca    8.5      2023 04:55  Phos  3.0       Mg     2.2             Urinalysis Basic - ( 2023 22:28 )    Color: Yellow / Appearance: Clear / S.029 / pH: x  Gluc: x / Ketone: Negative  / Bili: Negative / Urobili: 2 mg/dL   Blood: x / Protein: Trace / Nitrite: Negative   Leuk Esterase: Negative / RBC: 6 /hpf / WBC 2 /HPF   Sq Epi: x / Non Sq Epi: 0 /hpf / Bacteria: Negative        MICROBIOLOGY REVIEWED:  Culture - Blood (23 @ 21:50)   Specimen Source: .Blood Blood-Peripheral   Culture Results:   No growth to date.   RADIOLOGY REVIEWED:    < from: Xray Abdomen 1 View PORTABLE -Urgent (Xray Abdomen 1 View PORTABLE -Urgent .) (23 @ 20:59) >    IMPRESSION:  Gaseous distention of the stomach. Otherwise, nonobstructive bowel gas   pattern.    --- End of Report ---    < end of copied text >   CC: f/u for fever post-op    Patient resting comfortably in bed, no new c/o    REVIEW OF SYSTEMS: no fevers, no chills, no nausea, no vomiting, no abd pain, no resp symptoms  All other review of systems negative (Comprehensive ROS)    Antimicrobials Day # 2-3  piperacillin/tazobactam IVPB.. 3.375 Gram(s) IV Intermittent every 8 hours    Other Medications Reviewed    T(F): 98.8 (23 @ 04:53), Max: 98.8 (23 @ 04:53)  HR: 97 (23 @ 04:53)  BP: 119/68 (23 @ 04:53)  RR: 18 (23 @ 04:53)  SpO2: 96% (23 @ 04:53)    PHYSICAL EXAM:  General: alert, no acute distress  Eyes:  anicteric, no conjunctival injection, no discharge  Oropharynx: no lesions or injection 	  Neck: supple, without adenopathy  Lungs: diminished at bases  Heart: regular rate and rhythm; no murmur, rubs or gallops  Abdomen: soft, distended, nontender, without mass or organomegaly  Skin: no lesions, back incision clean, hemovac drain in place  Extremities: no clubbing, cyanosis, or edema  Neurologic: alert, follows commands, moves all extremities    LAB RESULTS:                        9.3    6.38  )-----------( 161      ( 2023 04:54 )             29.4     -    140  |  105  |  47<H>  ----------------------------<  170<H>  3.9   |  24  |  0.93    Ca    8.5      2023 04:55  Phos  3.0     -  Mg     2.2     -        Urinalysis Basic - ( 2023 22:28 )    Color: Yellow / Appearance: Clear / S.029 / pH: x  Gluc: x / Ketone: Negative  / Bili: Negative / Urobili: 2 mg/dL   Blood: x / Protein: Trace / Nitrite: Negative   Leuk Esterase: Negative / RBC: 6 /hpf / WBC 2 /HPF   Sq Epi: x / Non Sq Epi: 0 /hpf / Bacteria: Negative        MICROBIOLOGY REVIEWED:  Culture - Blood (23 @ 21:50)   Specimen Source: .Blood Blood-Peripheral   Culture Results:   No growth to date.   RADIOLOGY REVIEWED:    < from: Xray Abdomen 1 View PORTABLE -Urgent (Xray Abdomen 1 View PORTABLE -Urgent .) (23 @ 20:59) >    IMPRESSION:  Gaseous distention of the stomach. Otherwise, nonobstructive bowel gas   pattern.    --- End of Report ---    < end of copied text >

## 2023-01-04 NOTE — PROGRESS NOTE ADULT - ASSESSMENT
Patient is a 87y old  Male who presents with a chief complaint of mechanical fall    88yo M h/o dementia, Afib on Xarelto, presenting as transfer from [facility] after mechanical fall. Pt had a ground level fall yesterday, and was transferred here for higher level of care.  Got Kcentra at [facility]    PROCEDURE: Adm 12/28. 12/31 L1-4 Perc Screw-thoracolumbar, posterior approach     POD#4    PLAN:  Neuro: Cont HMV drain. Condom cath on-monitor UO.  Anemia from surgical blood loss-stable. Cont Zosyn [id number] ID note-P. Hx A. Fib when to resume Xarelto FU.  Inc activity/OOB. PT/OT recomm [facility], but family wants to take pt home pending hosp course-will need Ltr of necessity for [facility] and script, Ltr for Hosp bed and script, 3:1 commode, RW script once Medically/ID clear for DC home.    ID note of 1/3- He had fevers and hence ID eval requested. He is currently on IV Zosyn. Pt is unable to offer any specific c/o. He has been constipated as per family, no  symptoms, but had cough and SOB and felt warm etiology of fever likely multifactorial possible due to atelectasis, constipation and large Left RP hematoma he is also at risk for nosocomial PNA  SUGGEST:Agree with empiric Zosyn for now f/u cultures  aspiration precautions if cont to spike, may need to consider dedicated CT chest/abd, repeat Resp PCR d/w pt's son at bedsideElectronic Signatures: Dell Sen (DO)     Medicine note of 1/3-currently hgb stable no need for transfusion, dvt ppx -lovenox,,Pulm edema 2/2 Afib rvr / Acute hfpef -per family pt has chronic wheezing), no history of  copd or asthma;  possibly some underlying reactive airway dz -cxr noted, pro bnp elevated, -no history of smoking, copd or asthma -afib controlled -tte normal lvef no wma; mild valvular dz  -s/p lasix -C/w nebs and Pulmicort per NSICU Hematoma. Found to have large hematoma within expanded left psoas muscle extending into the left iliac is muscle. Trend h/h   AC on hold; when safe to use should be on full dose AC Dementia. -C/w aricept 10mg and namenda 5mg bid.Hypertension. C/w metoprolol;lisinopril on hold Chronic atrial fibrillation.  started on metoprolol  should be on full dose AC if ok with nsgy , currently on lovenox 40 mg daily; when safe to use Diabetes mellitus. - A1C 6.8 C/w SS Takes metformin 500mg at home DM well controlled, PMD follow up for medicaiton optimization, would not discharge on metformin.Hyperlipemia. Simvastatin 10mg. PCP Dr. Shasta [phone]  Home meds: donepezil 10mg, sertraline 25mg, metformin 500mg qd, xaretlo 20mg, memantine 5mg bid, flomax 0.4mg, lisinopril 5mg, simvastatin 10mg.Electronic Signatures:  Mati Santamraia)     Cardio note of 1/2-Cardiology called for episodes of PVCs, Afib, bigeminy.1. pAfib, PVCs, bigeminy- Increase frequency of Metoprolol Tartrate 25mg to q8h, goal HR < 110  - Can use Metoprolol 5mg IV prn for elevated HRs above 130- Resume anticoagulation once cleared from surgery perspective- Keep K > 4, Mag > 2- Monitor on Telemetry   Attestation Statements: I have personally seen and examined the patient.  I fully participated in the care of this patient.  I have made amendments to the documentation where necessary, and agree with the history, physical exam, and plan as documented by the Fellow. Agree with plan as outlined above.Known AF w rapid rates following procedure  Increase blockade for now Electronic Signatures:Maco Norwood)  (Signed 02-Jan-2023 14:12)Authored: Attestation StatementsCo-Signer: Progress Note, Reason for Admission, Subjective and Objective, Assessment and Plan  Ericka Jordan)      Respiratory: Patient instructed to use incentive spirometer [ X] YES [ ] NO              DVT ppx: [X ] SQL [ ] SQH and Venodynes [ ] Left [ ] Right [ X] Bilateral    Discharge Planning:  The patient was evaluated by PT/OT and recommended [facility]. He was subsequently DC on >>>/2023 in stable condition.    More than 30 minutes spent on total encounter: more than 50% of the visit was spent on educating the patient and family regarding condition, medications, follow up plans, signs and symptoms to be concerned with, preparing paperwork, and questions answered regarding discharge.

## 2023-01-04 NOTE — PROGRESS NOTE ADULT - ASSESSMENT
88y male with hx dementia, afib, BPH, HTN, HLD, and DM2 who presented as a transfer from [facility] after mechanical fall. He was found to have L2 fracture and associated L psoas hematoma. Admitted to [facility] 12/28, now s/p L1-L4 percutaneous fusion. Post op pulm edema 2/2 Afib rvr / Acute CHF, s/p lasix, nebs. He also found to have large hematoma within expanded left psoas muscle extending into the left iliacus muscle. He had fevers and hence ID eval requested. He is currently on IV Zosyn. Pt is unable to offer any specific c/o. He has been constipated as per family, no  symptoms, but had cough and SOB and felt warm   etiology of fever likely multifactorial possible due to atelectasis, constipation and large Left RP hematoma  he is also at risk for nosocomial PNA    SUGGEST:  cont empiric Zosyn for now  f/u cultures   aspiration precautions  if cont to spike, may need to consider dedicated CT chest/abd, repeat Resp PCR  d/w pt's son at bedside

## 2023-01-04 NOTE — PROGRESS NOTE ADULT - SUBJECTIVE AND OBJECTIVE BOX
SUBJECTIVE: This is my initial contact with this pt. Comfortable with mild pain only, son wants pain meds only at nite, but d/w son that pt needs pain meds. Son at bedside, also d/w pt daughter via phone and son at bedside.     OVERNIGHT EVENTS: None    Vital Signs Last 24 Hrs  T(C): 37.1 (04 Jan 2023 04:53), Max: 37.1 (04 Jan 2023 04:53)  T(F): 98.8 (04 Jan 2023 04:53), Max: 98.8 (04 Jan 2023 04:53)  HR: 97 (04 Jan 2023 04:53) (85 - 97)  BP: 119/68 (04 Jan 2023 04:53) (106/67 - 119/68)  BP(mean): --  RR: 18 (04 Jan 2023 04:53) (18 - 20)  SpO2: 96% (04 Jan 2023 04:53) (93% - 96%)    Parameters below as of 04 Jan 2023 08:00  Patient On (Oxygen Delivery Method): room air      IVF: [X ] IVL [ ] NS+K@   DIET: [ ] Regular [ ] CCD [ ] Renal [ X] CCD Puree [ ] Dysphagia [ ] Tube Feeds:   PCA: [ ] YES [X ] NO   WALLACE: [ ] YES [ X] NO [X ] VOID/Incontinence via Condom Cath  BM: [ X] YES-on 1/4     DRAINS: [ ] MARCELINO (cc/24h) [X ] HMV 80cc (cc/24h)    PHYSICAL EXAM:    Constitutional: No Acute Distress     Neurological: Bengali speaking-mostly. Alert awake, oriented to self answer some questions correctly in Bengali as per son, able to follow simple commands. CROSS 4+/5    Sensation: [X] intact to light touch  [] decreased:     Pulmonary: Clear to Auscultation, No rales, No rhonchi, No wheezes     Cardiovascular: S1, S2, Regular rate and rhythm     Gastrointestinal: Soft, Non-tender, Non-distended     Extremities: No calf tenderness     Incision: HMV active. +staples. CDI/Flat    LABS:                        9.3    6.38  )-----------( 161      ( 03 Jan 2023 04:54 )             29.4    01-03    140  |  105  |  47<H>  ----------------------------<  170<H>  3.9   |  24  |  0.93    Ca    8.5      03 Jan 2023 04:55  Phos  3.0     01-03  Mg     2.2     01-03    Culture - Blood (01.02.23 @ 21:50)    Specimen Source: .Blood Blood-Peripheral    Culture Results:   No growth to date.    SARS-CoV-2: NotDetec (02 Jan 2023 22:28)  COVID-19 PCR: [facility] (29 Dec 2022 16:29)    IMAGING:  < from: Xray Abdomen 1 View PORTABLE -Urgent (Xray Abdomen 1 View PORTABLE -Urgent .) (01.02.23 @ 20:59) >  Gaseous distention of the stomach. Otherwise, nonobstructive bowel gas   pattern.    < end of copied text >  < from: Xray Chest 1 View- PORTABLE-Urgent (Xray Chest 1 View- PORTABLE-Urgent .) (01.02.23 @ 20:59) >  Clear lungs.    < end of copied text >    < from: CT Head No Cont (01.01.23 @ 11:02) >  No acute intracranial hemorrhage, hydrocephalus or extra-axial fluid   collection.  Mild parenchymal volume loss and mild chronic microvascular ischemic   changes.  No CT evidence for acute transcortical infarction. Please note that MR   imaging is a more sensitive imaging modality for detection of acute   infarction and may be obtained as clinically warranted.    < end of copied text >    < from: MR Lumbar Spine No Cont (12.29.22 @ 23:34) >    Markedly limited examination due to patient motion. Oblique fracture   through the inferior body of L2 extending through the anterior cortex and   inferior endplate with mild distraction. Associated edema is   noted.Enlargement of the LEFT psoas muscle consistent with known   intramuscular hematoma. Degenerative disc disease and spondylosis as   described.    < end of copied text >    < from: CT Pelvis No Cont (12.28.22 @ 15:23) >  CT cervical spine: No vertebral fracture is recognized.  Advanced   degenerative disc disease and spondylosis with loss of disc height and  associated degenerative endplate changes. There is narrowing of the LEFT   C2-3, BILATERAL C3-4, C4-5, LEFT C5-6 and RIGHT C6-7 neural foramina due   to uncovertebral spurring and facet osteophytic hypertrophy.   Laminectomies are noted at C3-C5.    CT thoracic/lumbar:  Chalk-stick fracture through the L2 vertebral body   anteriorly extending into the inferior endplate posteriorly with mild   distraction of fragments.   DISH is present with calcification of   anterior longitudinal ligament.  Multilevel degenerative disc disease and   spondylosis. Posterior osteophytic ridge/disc complexes at L1-2 through   L5-S1 flatten the ventral thecal sac and narrow the BILATERAL neural   foramina and contributes to mild central stenosis at these levelsin   conjunction with facet osteophytic hypertrophy.    CT pelvis: No fracture.   Large hematoma is seen within expanded LEFT   psoas muscle extending into the LEFT iliac is muscle. Stranding in the   LEFT retroperitoneum consistent with mild retroperitoneal hemorrhage.    < end of copied text >    MEDICATIONS  (STANDING):  albuterol/ipratropium for Nebulization 3 milliLiter(s) Nebulizer every 4 hours  bisacodyl 5 milliGRAM(s) Oral every 12 hours  buDESOnide    Inhalation Suspension 0.25 milliGRAM(s) Inhalation every 12 hours  enoxaparin Injectable 40 milliGRAM(s) SubCutaneous <User Schedule>  folic acid 1 milliGRAM(s) Oral daily  insulin lispro (ADMELOG) corrective regimen sliding scale   SubCutaneous Before meals and at bedtime  metoprolol tartrate 25 milliGRAM(s) Oral every 8 hours  piperacillin/tazobactam IVPB.. 3.375 Gram(s) IV Intermittent every 8 hours  polyethylene glycol 3350 17 Gram(s) Oral every 12 hours  pregabalin 100 milliGRAM(s) Oral every 8 hours  senna 2 Tablet(s) Oral at bedtime  sodium chloride 3%  Inhalation 4 milliLiter(s) Inhalation every 12 hours  tamsulosin 0.4 milliGRAM(s) Oral at bedtime    MEDICATIONS  (PRN):  acetaminophen     Tablet .. 650 milliGRAM(s) Oral every 6 hours PRN Temp greater or equal to 38C (100.4F), Mild Pain (1 - 3)  traMADol 25 milliGRAM(s) Oral every 4 hours PRN Moderate Pain (4 - 6)

## 2023-01-04 NOTE — CHART NOTE - NSCHARTNOTEFT_GEN_A_CORE
Hospital Bed-Letter of Necessity:  Based on patient ongoing issues and deconditioning and generalized weakness secondary to patient diagnosis (L1-4 Perc Screws).  Patient will require a semi electric hospital bed. This is necessary to achieve positioning, elevation and head of bed needs to sit up.  Bed pillows and wedges have been tried and ruled out.

## 2023-01-04 NOTE — CHART NOTE - NSCHARTNOTEFT_GEN_A_CORE
Odette Lift-Letter of Necessity:  Based on patient ongoing issues and deconditioning and generalized weakness secondary to patient diagnosis (S/P L1-4 Perc Screws).  Patient will require a Odette Lift). This is necessary for pt to get in and out of bed due to deconditioning.

## 2023-01-05 LAB
GLUCOSE BLDC GLUCOMTR-MCNC: 136 MG/DL — HIGH (ref 70–99)
GLUCOSE BLDC GLUCOMTR-MCNC: 140 MG/DL — HIGH (ref 70–99)
GLUCOSE BLDC GLUCOMTR-MCNC: 167 MG/DL — HIGH (ref 70–99)
GLUCOSE BLDC GLUCOMTR-MCNC: 183 MG/DL — HIGH (ref 70–99)

## 2023-01-05 RX ORDER — DONEPEZIL HYDROCHLORIDE 10 MG/1
10 TABLET, FILM COATED ORAL AT BEDTIME
Refills: 0 | Status: DISCONTINUED | OUTPATIENT
Start: 2023-01-05 | End: 2023-01-10

## 2023-01-05 RX ORDER — MEMANTINE HYDROCHLORIDE 10 MG/1
5 TABLET ORAL
Refills: 0 | Status: DISCONTINUED | OUTPATIENT
Start: 2023-01-05 | End: 2023-01-10

## 2023-01-05 RX ADMIN — DONEPEZIL HYDROCHLORIDE 10 MILLIGRAM(S): 10 TABLET, FILM COATED ORAL at 22:11

## 2023-01-05 RX ADMIN — Medication 0.25 MILLIGRAM(S): at 06:06

## 2023-01-05 RX ADMIN — SODIUM CHLORIDE 4 MILLILITER(S): 9 INJECTION INTRAMUSCULAR; INTRAVENOUS; SUBCUTANEOUS at 06:06

## 2023-01-05 RX ADMIN — SENNA PLUS 2 TABLET(S): 8.6 TABLET ORAL at 22:09

## 2023-01-05 RX ADMIN — Medication 2: at 22:08

## 2023-01-05 RX ADMIN — MEMANTINE HYDROCHLORIDE 5 MILLIGRAM(S): 10 TABLET ORAL at 19:02

## 2023-01-05 RX ADMIN — POLYETHYLENE GLYCOL 3350 17 GRAM(S): 17 POWDER, FOR SOLUTION ORAL at 19:02

## 2023-01-05 RX ADMIN — Medication 2: at 12:39

## 2023-01-05 RX ADMIN — Medication 1 MILLIGRAM(S): at 12:39

## 2023-01-05 RX ADMIN — PIPERACILLIN AND TAZOBACTAM 25 GRAM(S): 4; .5 INJECTION, POWDER, LYOPHILIZED, FOR SOLUTION INTRAVENOUS at 06:04

## 2023-01-05 RX ADMIN — Medication 3 MILLILITER(S): at 22:25

## 2023-01-05 RX ADMIN — ENOXAPARIN SODIUM 40 MILLIGRAM(S): 100 INJECTION SUBCUTANEOUS at 19:02

## 2023-01-05 RX ADMIN — Medication 25 MILLIGRAM(S): at 13:38

## 2023-01-05 RX ADMIN — TAMSULOSIN HYDROCHLORIDE 0.4 MILLIGRAM(S): 0.4 CAPSULE ORAL at 22:09

## 2023-01-05 RX ADMIN — Medication 3 MILLILITER(S): at 01:39

## 2023-01-05 RX ADMIN — Medication 100 MILLIGRAM(S): at 06:05

## 2023-01-05 RX ADMIN — POLYETHYLENE GLYCOL 3350 17 GRAM(S): 17 POWDER, FOR SOLUTION ORAL at 06:05

## 2023-01-05 RX ADMIN — Medication 100 MILLIGRAM(S): at 22:09

## 2023-01-05 RX ADMIN — Medication 3 MILLILITER(S): at 10:30

## 2023-01-05 RX ADMIN — PIPERACILLIN AND TAZOBACTAM 25 GRAM(S): 4; .5 INJECTION, POWDER, LYOPHILIZED, FOR SOLUTION INTRAVENOUS at 13:39

## 2023-01-05 RX ADMIN — Medication 100 MILLIGRAM(S): at 13:39

## 2023-01-05 RX ADMIN — Medication 25 MILLIGRAM(S): at 22:09

## 2023-01-05 RX ADMIN — Medication 25 MILLIGRAM(S): at 06:06

## 2023-01-05 RX ADMIN — Medication 3 MILLILITER(S): at 07:10

## 2023-01-05 RX ADMIN — PIPERACILLIN AND TAZOBACTAM 25 GRAM(S): 4; .5 INJECTION, POWDER, LYOPHILIZED, FOR SOLUTION INTRAVENOUS at 22:10

## 2023-01-05 RX ADMIN — Medication 5 MILLIGRAM(S): at 19:02

## 2023-01-05 RX ADMIN — Medication 5 MILLIGRAM(S): at 06:06

## 2023-01-05 RX ADMIN — Medication 3 MILLILITER(S): at 13:40

## 2023-01-05 NOTE — SWALLOW FEES ASSESSMENT ADULT - PRELIMINARY ENDOSCOPIC EXAMINATIONS
Minimal amount of dry secretions throughout the pharynx. Contact of true and false vocal folds during phonation. Prominence on R pharyngeal wall, suspect protrusion of hyoid bone. Thick tan secretions deep in the trachea.

## 2023-01-05 NOTE — PROGRESS NOTE ADULT - ASSESSMENT
88y male with hx dementia, afib, BPH, HTN, HLD, and DM2 who presented as a transfer from [facility] after mechanical fall. He was found to have L2 fracture and associated L psoas hematoma. Admitted to [facility] 12/28, now s/p L1-L4 percutaneous fusion. Post op pulm edema 2/2 Afib rvr / Acute CHF, s/p lasix, nebs. He also found to have large hematoma within expanded left psoas muscle extending into the left iliacus muscle. He had fevers and hence ID eval requested. He is currently on IV Zosyn. Pt is unable to offer any specific c/o. He has been constipated as per family, no  symptoms, but had cough and SOB and felt warm   etiology of fever likely multifactorial possible due to atelectasis, constipation and large Left RP hematoma  he is also at risk for nosocomial PNA    SUGGEST:  cont empiric Zosyn day 4/5  f/u cultures   aspiration precautions, swallow studies  if re-spike, may need to consider dedicated CT chest/abd, repeat Resp PCR  d/w pt's son at bedside

## 2023-01-05 NOTE — SWALLOW FEES ASSESSMENT ADULT - PHARYNGEAL PHASE COMMENTS
Residue is reduced with spontaneous repeat swallow Residue reduced with multiple repeat swallows and liquid wash

## 2023-01-05 NOTE — SWALLOW FEES ASSESSMENT ADULT - ASPIRATION PRECAUTIONS
Monitor for s/s aspiration/laryngeal penetration. If noted:  D/C p.o. intake, provide non-oral nutrition/hydration/meds, and contact this service @ [phone]

## 2023-01-05 NOTE — SWALLOW FEES ASSESSMENT ADULT - LARYNGEAL PENETRATION DURING SWALLOW - SILENT
On initial trials of moderately thick liquids, there is deep laryngeal penetration without retrieval/ sensory response; however, suspect 2/2 dry pharyngeal mucosa, as no penetration is evidenced with moderately thick liquids as exam continued. Trace, shallow, retrieved with spontaneous repeat swallow

## 2023-01-05 NOTE — PROGRESS NOTE ADULT - SUBJECTIVE AND OBJECTIVE BOX
SUBJECTIVE: Comfortable with mild pain only, but resp sounds wet w/occass cough. Family at bedside.     OVERNIGHT EVENTS: None    Vital Signs Last 24 Hrs  T(C): 36.5 (05 Jan 2023 09:00), Max: 36.9 (04 Jan 2023 13:11)  T(F): 97.7 (05 Jan 2023 09:00), Max: 98.5 (04 Jan 2023 13:11)  HR: 84 (05 Jan 2023 09:00) (84 - 93)  BP: 125/67 (05 Jan 2023 09:00) (109/71 - 129/67)  BP(mean): --  RR: 30 (05 Jan 2023 09:00) (18 - 30)  SpO2: 95% (05 Jan 2023 09:00) (93% - 96%)    Parameters below as of 05 Jan 2023 09:00  Patient On (Oxygen Delivery Method): room air    IVF: [X ] IVL [ ] NS+K@   DIET: [ ] Regular [ ] CCD [ ] Renal [ X] CCD Puree [ ] Dysphagia [ ] Tube Feeds:   PCA: [ ] YES [X ] NO   WALLACE: [ ] YES [ X] NO [X ] VOID/Incontinence via Condom Cath + St Cath, [place] [id number] last PM  BM: [ X] YES-on 1/4, 1/5     DRAINS: [ ] MARCELINO (cc/24h) [X ] HMV 60cc (cc/24h)    PHYSICAL EXAM:    Constitutional: No Acute Distress     Neurological: Ivorian speaking-mostly. AAOx2 (dementia), +Follow commands. CROSS 4+/5    Sensation: [X] intact to light touch  [] decreased:     Pulmonary: Clear to Auscultation, No rales, No rhonchi, No wheezes     Cardiovascular: S1, S2, Regular rate and rhythm     Gastrointestinal: Soft, Non-tender, Non-distended     Extremities: No calf tenderness     Incision: HMV active. +staples. CDI/Flat    LABS:                        9.3    6.38  )-----------( 161      ( 03 Jan 2023 04:54 )             29.4    01-03    140  |  105  |  47<H>  ----------------------------<  170<H>  3.9   |  24  |  0.93    Ca    8.5      03 Jan 2023 04:55  Phos  3.0     01-03  Mg     2.2     01-03    Culture - Blood (01.02.23 @ 21:50)    Specimen Source: .Blood Blood-Peripheral    Culture Results:   No growth to date.    SARS-CoV-2: NotDetec (02 Jan 2023 22:28)  COVID-19 PCR: [facility] (29 Dec 2022 16:29)    IMAGING:  < from: Xray Abdomen 1 View PORTABLE -Urgent (Xray Abdomen 1 View PORTABLE -Urgent .) (01.02.23 @ 20:59) >  Gaseous distention of the stomach. Otherwise, nonobstructive bowel gas   pattern.    < end of copied text >  < from: Xray Chest 1 View- PORTABLE-Urgent (Xray Chest 1 View- PORTABLE-Urgent .) (01.02.23 @ 20:59) >  Clear lungs.    < end of copied text >    < from: CT Head No Cont (01.01.23 @ 11:02) >  No acute intracranial hemorrhage, hydrocephalus or extra-axial fluid   collection.  Mild parenchymal volume loss and mild chronic microvascular ischemic   changes.  No CT evidence for acute transcortical infarction. Please note that MR   imaging is a more sensitive imaging modality for detection of acute   infarction and may be obtained as clinically warranted.    < end of copied text >    < from: MR Lumbar Spine No Cont (12.29.22 @ 23:34) >    Markedly limited examination due to patient motion. Oblique fracture   through the inferior body of L2 extending through the anterior cortex and   inferior endplate with mild distraction. Associated edema is   noted.Enlargement of the LEFT psoas muscle consistent with known   intramuscular hematoma. Degenerative disc disease and spondylosis as   described.    < end of copied text >    < from: CT Pelvis No Cont (12.28.22 @ 15:23) >  CT cervical spine: No vertebral fracture is recognized.  Advanced   degenerative disc disease and spondylosis with loss of disc height and  associated degenerative endplate changes. There is narrowing of the LEFT   C2-3, BILATERAL C3-4, C4-5, LEFT C5-6 and RIGHT C6-7 neural foramina due   to uncovertebral spurring and facet osteophytic hypertrophy.   Laminectomies are noted at C3-C5.    CT thoracic/lumbar:  Chalk-stick fracture through the L2 vertebral body   anteriorly extending into the inferior endplate posteriorly with mild   distraction of fragments.   DISH is present with calcification of   anterior longitudinal ligament.  Multilevel degenerative disc disease and   spondylosis. Posterior osteophytic ridge/disc complexes at L1-2 through   L5-S1 flatten the ventral thecal sac and narrow the BILATERAL neural   foramina and contributes to mild central stenosis at these levelsin   conjunction with facet osteophytic hypertrophy.    CT pelvis: No fracture.   Large hematoma is seen within expanded LEFT   psoas muscle extending into the LEFT iliac is muscle. Stranding in the   LEFT retroperitoneum consistent with mild retroperitoneal hemorrhage.    < end of copied text >    MEDICATIONS  (STANDING):  albuterol/ipratropium for Nebulization 3 milliLiter(s) Nebulizer every 4 hours  bisacodyl 5 milliGRAM(s) Oral every 12 hours  buDESOnide    Inhalation Suspension 0.25 milliGRAM(s) Inhalation every 12 hours  enoxaparin Injectable 40 milliGRAM(s) SubCutaneous <User Schedule>  folic acid 1 milliGRAM(s) Oral daily  insulin lispro (ADMELOG) corrective regimen sliding scale   SubCutaneous Before meals and at bedtime  metoprolol tartrate 25 milliGRAM(s) Oral every 8 hours  piperacillin/tazobactam IVPB.. 3.375 Gram(s) IV Intermittent every 8 hours  polyethylene glycol 3350 17 Gram(s) Oral every 12 hours  pregabalin 100 milliGRAM(s) Oral every 8 hours  senna 2 Tablet(s) Oral at bedtime  sodium chloride 3%  Inhalation 4 milliLiter(s) Inhalation every 12 hours  tamsulosin 0.4 milliGRAM(s) Oral at bedtime    MEDICATIONS  (PRN):  acetaminophen     Tablet .. 650 milliGRAM(s) Oral every 6 hours PRN Temp greater or equal to 38C (100.4F), Mild Pain (1 - 3)  traMADol 25 milliGRAM(s) Oral every 4 hours PRN Moderate Pain (4 - 6)

## 2023-01-05 NOTE — PROGRESS NOTE ADULT - ASSESSMENT
Patient is a 87y old  Male who presents with a chief complaint of mechanical fall    86yo M h/o dementia, Afib on Xarelto, presenting as transfer from [facility] after mechanical fall. Pt had a ground level fall yesterday, and was transferred here for higher level of care.  Got Kcentra at [facility]    PROCEDURE: Adm 12/28. 12/31 L1-4 Perc Screw-thoracolumbar, posterior approach     POD#4    PLAN:  Neuro: Cont HMV drain. Condom cath on + St Cath PRN-monitor UO.  Resp sounds wet-PT/PD, cont nebs. Anemia from surgical blood loss-stable. Cont Zosyn per ID-FU ID note-P, if cont to spike, may need to consider dedicated CT chest/abd, repeat Resp PCR.   Hx A. Fib when to resume Xarelto FU.  Inc activity/OOB. PT/OT recomm [facility], but family wants to take pt home pending hosp course-all DME scripts written. DC Home once Medically/ID clear. FU CBC, BMP AM. Family to bring in pt Dementia meds so can resume it FU.    FEEST to be done today to R/O asp and diet upgrade for DC home FU.    ID note of 1/4-  He had fevers and hence ID eval requested. He is currently on IV Zosyn. Pt is unable to offer any specific c/o. He has been constipated as per family, no  symptoms, but had cough and SOB and felt warm. etiology of fever likely multifactorial possible due to atelectasis, constipation and large Left RP hematoma he is also at risk for nosocomial PNA  SUGGEST:cont empiric Zosyn for now f/u cultures aspiration precautions if cont to spike, may need to consider dedicated CT chest/abd, repeat Resp PCR d/w pt's son at bedside     Electronic Signatures:Dell Sen (DO    Medicine note of 1/3-currently hgb stable no need for transfusion, dvt ppx -lovenox,,Pulm edema 2/2 Afib rvr / Acute hfpef -per family pt has chronic wheezing), no history of  copd or asthma;  possibly some underlying reactive airway dz -cxr noted, pro bnp elevated, -no history of smoking, copd or asthma -afib controlled -tte normal lvef no wma; mild valvular dz  -s/p lasix -C/w nebs and Pulmicort per NSICU Hematoma. Found to have large hematoma within expanded left psoas muscle extending into the left iliac is muscle. Trend h/h   AC on hold; when safe to use should be on full dose AC Dementia. -C/w aricept 10mg and namenda 5mg bid.Hypertension. C/w metoprolol;lisinopril on hold Chronic atrial fibrillation.  started on metoprolol  should be on full dose AC if ok with nsgy , currently on lovenox 40 mg daily; when safe to use Diabetes mellitus. - A1C 6.8 C/w SS Takes metformin 500mg at home DM well controlled, PMD follow up for medicaiton optimization, would not discharge on metformin.Hyperlipemia. Simvastatin 10mg. PCP Dr. Shasta [phone]  Home meds: donepezil 10mg, sertraline 25mg, metformin 500mg qd, xaretlo 20mg, memantine 5mg bid, flomax 0.4mg, lisinopril 5mg, simvastatin 10mg.Electronic Signatures:  Mati Santamaria)     Cardio note of 1/2-Cardiology called for episodes of PVCs, Afib, bigeminy.1. pAfib, PVCs, bigeminy- Increase frequency of Metoprolol Tartrate 25mg to q8h, goal HR < 110  - Can use Metoprolol 5mg IV prn for elevated HRs above 130- Resume anticoagulation once cleared from surgery perspective- Keep K > 4, Mag > 2- Monitor on Telemetry   Attestation Statements: I have personally seen and examined the patient.  I fully participated in the care of this patient.  I have made amendments to the documentation where necessary, and agree with the history, physical exam, and plan as documented by the Fellow. Agree with plan as outlined above.Known AF w rapid rates following procedure  Increase blockade for now Electronic Signatures:Maco Norwood)  (Signed 02-Jan-2023 14:12)Authored: Attestation StatementsCo-Signer: Progress Note, Reason for Admission, Subjective and Objective, Assessment and Plan  Ericka Jordan)      Respiratory: Patient instructed to use incentive spirometer [ X] YES [ ] NO              DVT ppx: [X ] SQL [ ] SQH and Venodynes [ ] Left [ ] Right [ X] Bilateral    Discharge Planning:  The patient was evaluated by PT/OT and recommended [facility]hab. He was subsequently DC on >>>/2023 in stable condition.    More than 30 minutes spent on total encounter: more than 50% of the visit was spent on educating the patient and family regarding condition, medications, follow up plans, signs and symptoms to be concerned with, preparing paperwork, and questions answered regarding discharge.

## 2023-01-05 NOTE — SWALLOW FEES ASSESSMENT ADULT - COMMENTS
12/28: CT HEAD: No acute abnormality. Chronic changes as above.  12/30: CXR: Right middle and lower lobe haziness, similar to prior.    Pt unknown to this service

## 2023-01-05 NOTE — SWALLOW FEES ASSESSMENT ADULT - SLP GENERAL OBSERVATIONS
Pt seen for FEES. Pt encountered in bed, awake/alert, on room air. Baseline congested cough. Son-in-law Mason present for exam.

## 2023-01-05 NOTE — SWALLOW FEES ASSESSMENT ADULT - DIAGNOSTIC IMPRESSIONS
Pt presents with an oropharyngeal dysphagia. There is silent aspiration of thin liquids without retrieval and moderate pharyngeal residue, increased with more advanced solid textures, which requires multiple repeat swallows and a liquid wash to clear. Pt presents with an oropharyngeal dysphagia. There is premature spillage and silent aspiration of thin liquids without retrieval. On initial trials of moderately thick liquids there is deep laryngeal penetration to the vocal cords, however, material is eventually retrieved during repeat swallows and not duplicated across subsequent trials. Moderate pharyngeal residue is seen, increased with more advanced solid textures, which requires multiple repeat swallows and a liquid wash to clear. Pt presents with an oropharyngeal dysphagia. There is premature spillage and silent aspiration of thin liquids without retrieval. On initial trials of moderately thick liquids there is deep laryngeal penetration to the vocal cords, however, material is eventually retrieved during repeat swallows and not duplicated across subsequent trials. Moderate pharyngeal residue is seen, increased with more advanced solid textures, which requires multiple repeat swallows and a liquid wash to clear. Cough is deemed to be non-deglutitive based on this exam, likely related to tracheal secretions instead.

## 2023-01-05 NOTE — PROGRESS NOTE ADULT - SUBJECTIVE AND OBJECTIVE BOX
Patient is a 88y old  Male who presents with a chief complaint of Transfer level 2 trauma (05 Jan 2023 10:48)      SUBJECTIVE / OVERNIGHT EVENTS:    Patient seen and examined. poor historian.      Vital Signs Last 24 Hrs  T(C): 36.5 (05 Jan 2023 09:00), Max: 36.9 (04 Jan 2023 13:11)  T(F): 97.7 (05 Jan 2023 09:00), Max: 98.5 (04 Jan 2023 13:11)  HR: 84 (05 Jan 2023 09:00) (84 - 93)  BP: 125/67 (05 Jan 2023 09:00) (109/71 - 129/67)  BP(mean): --  RR: 30 (05 Jan 2023 09:00) (18 - 30)  SpO2: 95% (05 Jan 2023 09:00) (93% - 96%)    Parameters below as of 05 Jan 2023 09:00  Patient On (Oxygen Delivery Method): room air      I&O's Summary    04 Jan 2023 07:01  -  05 Jan 2023 07:00  --------------------------------------------------------  IN: 400 mL / OUT: 660 mL / NET: -260 mL    05 Jan 2023 07:01  -  05 Jan 2023 12:08  --------------------------------------------------------  IN: 120 mL / OUT: 0 mL / NET: 120 mL        PE:  GENERAL: NAD, AAOx1  CHEST/LUNG: CTABL, No wheeze  HEART: Regular rate and rhythm; no murmur  ABDOMEN: Soft, Nontender, Nondistended; Bowel sounds present  EXTREMITIES:  2+ Peripheral Pulses, No edema  NEURO: No focal deficits    LABS:            CAPILLARY BLOOD GLUCOSE      POCT Blood Glucose.: 167 mg/dL (05 Jan 2023 11:53)  POCT Blood Glucose.: 140 mg/dL (05 Jan 2023 07:41)  POCT Blood Glucose.: 162 mg/dL (04 Jan 2023 21:14)  POCT Blood Glucose.: 151 mg/dL (04 Jan 2023 16:13)            RADIOLOGY & ADDITIONAL TESTS:    Imaging Personally Reviewed:  [x] YES  [ ] NO    Consultant(s) Notes Reviewed:  [x] YES  [ ] NO    MEDICATIONS  (STANDING):  albuterol/ipratropium for Nebulization 3 milliLiter(s) Nebulizer every 4 hours  bisacodyl 5 milliGRAM(s) Oral every 12 hours  buDESOnide    Inhalation Suspension 0.25 milliGRAM(s) Inhalation every 12 hours  enoxaparin Injectable 40 milliGRAM(s) SubCutaneous <User Schedule>  folic acid 1 milliGRAM(s) Oral daily  insulin lispro (ADMELOG) corrective regimen sliding scale   SubCutaneous Before meals and at bedtime  metoprolol tartrate 25 milliGRAM(s) Oral every 8 hours  piperacillin/tazobactam IVPB.. 3.375 Gram(s) IV Intermittent every 8 hours  polyethylene glycol 3350 17 Gram(s) Oral every 12 hours  pregabalin 100 milliGRAM(s) Oral every 8 hours  senna 2 Tablet(s) Oral at bedtime  sodium chloride 3%  Inhalation 4 milliLiter(s) Inhalation every 12 hours  tamsulosin 0.4 milliGRAM(s) Oral at bedtime    MEDICATIONS  (PRN):  acetaminophen     Tablet .. 650 milliGRAM(s) Oral every 6 hours PRN Temp greater or equal to 38C (100.4F), Mild Pain (1 - 3)  traMADol 25 milliGRAM(s) Oral every 4 hours PRN Moderate Pain (4 - 6)      Care Discussed with Consultants/Other Providers [x] YES  [ ] NO    HEALTH ISSUES - PROBLEM Dx:  Compression fracture of L2    Hematoma    Dementia    Hypertension    Chronic atrial fibrillation    Diabetes mellitus    Hyperlipemia    Medication management    Preoperative clearance

## 2023-01-05 NOTE — PROGRESS NOTE ADULT - ASSESSMENT
86yo M h/o dementia, Afib on Xarelto, BPH, HTN, HLD, DM2 presenting as transfer from [facility] after mechanical fall. Pt found to have L2 fracture, and small associated L psoas hematoma.    # Compression fracture of L2  s/p L1-L4 percutaneous fusion  pain control, bowel regimen  PT OOB   plan per primary team    # Pulm edema 2/2 Afib rvr / Acute hfpef   afib controlled  tte normal lvef no wma; mild valvular dz  s/p lasix   C/w nebs and Pulmicort     # Hematoma left psoas muscle extending into the left iliac muscle  h/h stable  holding full AC, on dvt ppx    # Dementia   C/w aricept 10mg and namenda 5mg bid    # Hypertension  C/w metoprolol  lisinopril on hold    # Chronic atrial fibrillation  cont metoprolol   resume full dose AC once appropriate    # Diabetes mellitus  A1C 6.8   C/w SS  Takes metformin 500mg at home, resume on DC, outpt fu    # Hyperlipemia  Simvastatin 10mg    PCP Dr. Shasta Victor    Please call [employer] with questions [phone]. 86yo M h/o dementia, Afib on Xarelto, BPH, HTN, HLD, DM2 presenting as transfer from [facility] after mechanical fall. Pt found to have L2 fracture, and small associated L psoas hematoma.    # Compression fracture of L2  s/p L1-L4 percutaneous fusion  pain control, bowel regimen  PT OOB   on zosyn for post op fever, afebrile, ID following  plan per primary team    # Pulm edema 2/2 Afib rvr / Acute hfpef   afib controlled  tte normal lvef no wma; mild valvular dz  s/p lasix   C/w nebs and Pulmicort     # Hematoma left psoas muscle extending into the left iliac muscle  h/h stable  holding full AC, on dvt ppx    # Dementia   C/w aricept 10mg and namenda 5mg bid  FEES study today    # Hypertension  C/w metoprolol  lisinopril on hold    # Chronic atrial fibrillation  cont metoprolol   resume full dose AC once appropriate    # Diabetes mellitus  A1C 6.8   C/w SS  Takes metformin 500mg at home, resume on DC, outpt fu    # Hyperlipemia  Simvastatin 10mg    PCP Dr. Shasta Victor    Please call [employer] with questions [phone].

## 2023-01-05 NOTE — PROGRESS NOTE ADULT - SUBJECTIVE AND OBJECTIVE BOX
CC: f/u for fever post-op    Patient resting comfortably in bed, no new c/o    REVIEW OF SYSTEMS: no fevers, no chills, no nausea, no vomiting, no abd pain, no resp symptoms  All other review of systems negative (Comprehensive ROS)    Antimicrobials Day # 4  piperacillin/tazobactam IVPB.. 3.375 Gram(s) IV Intermittent every 8 hours      Other Medications Reviewed    Vital Signs Last 24 Hrs  T(C): 36.5 (2023 09:00), Max: 36.9 (2023 13:11)  T(F): 97.7 (2023 09:00), Max: 98.5 (2023 13:11)  HR: 84 (2023 09:00) (84 - 93)  BP: 125/67 (2023 09:00) (109/71 - 129/67)  BP(mean): --  RR: 30 (2023 09:00) (18 - 30)  SpO2: 95% (2023 09:00) (93% - 96%)    Parameters below as of 2023 09:00  Patient On (Oxygen Delivery Method): room air      PHYSICAL EXAM:  General: alert, no acute distress  Eyes:  anicteric, no conjunctival injection, no discharge  Oropharynx: no lesions or injection 	  Neck: supple, without adenopathy  Lungs: diminished at bases  Heart: regular rate and rhythm; no murmur, rubs or gallops  Abdomen: soft, distended, nontender, without mass or organomegaly  Skin: no lesions, back incision clean, hemovac drain in place  Extremities: no clubbing, cyanosis, or edema  Neurologic: alert, follows commands, moves all extremities    LAB RESULTS:        Urinalysis Basic - ( 2023 22:28 )    Color: Yellow / Appearance: Clear / S.029 / pH: x  Gluc: x / Ketone: Negative  / Bili: Negative / Urobili: 2 mg/dL   Blood: x / Protein: Trace / Nitrite: Negative   Leuk Esterase: Negative / RBC: 6 /hpf / WBC 2 /HPF   Sq Epi: x / Non Sq Epi: 0 /hpf / Bacteria: Negative        MICROBIOLOGY REVIEWED:      Culture - Blood (23 @ 21:50)   Specimen Source: .Blood Blood-Peripheral   Culture Results:   No growth to date.   RADIOLOGY REVIEWED:    < from: Xray Abdomen 1 View PORTABLE -Urgent (Xray Abdomen 1 View PORTABLE -Urgent .) (23 @ 20:59) >    IMPRESSION:  Gaseous distention of the stomach. Otherwise, nonobstructive bowel gas   pattern.    --- End of Report ---    < end of copied text >

## 2023-01-05 NOTE — SWALLOW FEES ASSESSMENT ADULT - ROSENBEK'S PENETRATION ASPIRATION SCALE
(2) material enters airway, remains above the vocal cords, no residue remains (penetration) (8) material passes glottis, visible subglottic residue remains, absent patient response (aspiration) (1) no aspiration, material does not enter airway

## 2023-01-06 DIAGNOSIS — R06.02 SHORTNESS OF BREATH: ICD-10-CM

## 2023-01-06 DIAGNOSIS — R13.10 DYSPHAGIA, UNSPECIFIED: ICD-10-CM

## 2023-01-06 DIAGNOSIS — M43.26 FUSION OF SPINE, LUMBAR REGION: ICD-10-CM

## 2023-01-06 DIAGNOSIS — R50.9 FEVER, UNSPECIFIED: ICD-10-CM

## 2023-01-06 DIAGNOSIS — I48.91 UNSPECIFIED ATRIAL FIBRILLATION: ICD-10-CM

## 2023-01-06 LAB
ANION GAP SERPL CALC-SCNC: 9 MMOL/L — SIGNIFICANT CHANGE UP (ref 5–17)
BUN SERPL-MCNC: 32 MG/DL — HIGH (ref 7–23)
CALCIUM SERPL-MCNC: 8.3 MG/DL — LOW (ref 8.4–10.5)
CHLORIDE SERPL-SCNC: 109 MMOL/L — HIGH (ref 96–108)
CO2 SERPL-SCNC: 23 MMOL/L — SIGNIFICANT CHANGE UP (ref 22–31)
CREAT SERPL-MCNC: 0.83 MG/DL — SIGNIFICANT CHANGE UP (ref 0.5–1.3)
EGFR: 85 ML/MIN/1.73M2 — SIGNIFICANT CHANGE UP
GLUCOSE BLDC GLUCOMTR-MCNC: 134 MG/DL — HIGH (ref 70–99)
GLUCOSE BLDC GLUCOMTR-MCNC: 139 MG/DL — HIGH (ref 70–99)
GLUCOSE BLDC GLUCOMTR-MCNC: 156 MG/DL — HIGH (ref 70–99)
GLUCOSE SERPL-MCNC: 144 MG/DL — HIGH (ref 70–99)
HCT VFR BLD CALC: 30.3 % — LOW (ref 39–50)
HGB BLD-MCNC: 9.7 G/DL — LOW (ref 13–17)
MCHC RBC-ENTMCNC: 30.4 PG — SIGNIFICANT CHANGE UP (ref 27–34)
MCHC RBC-ENTMCNC: 32 GM/DL — SIGNIFICANT CHANGE UP (ref 32–36)
MCV RBC AUTO: 95 FL — SIGNIFICANT CHANGE UP (ref 80–100)
NRBC # BLD: 0 /100 WBCS — SIGNIFICANT CHANGE UP (ref 0–0)
PLATELET # BLD AUTO: 196 K/UL — SIGNIFICANT CHANGE UP (ref 150–400)
POTASSIUM SERPL-MCNC: 4 MMOL/L — SIGNIFICANT CHANGE UP (ref 3.5–5.3)
POTASSIUM SERPL-SCNC: 4 MMOL/L — SIGNIFICANT CHANGE UP (ref 3.5–5.3)
RBC # BLD: 3.19 M/UL — LOW (ref 4.2–5.8)
RBC # FLD: 14.8 % — HIGH (ref 10.3–14.5)
SODIUM SERPL-SCNC: 141 MMOL/L — SIGNIFICANT CHANGE UP (ref 135–145)
WBC # BLD: 6.64 K/UL — SIGNIFICANT CHANGE UP (ref 3.8–10.5)
WBC # FLD AUTO: 6.64 K/UL — SIGNIFICANT CHANGE UP (ref 3.8–10.5)

## 2023-01-06 PROCEDURE — 71250 CT THORAX DX C-: CPT | Mod: 26

## 2023-01-06 PROCEDURE — 71045 X-RAY EXAM CHEST 1 VIEW: CPT | Mod: 26

## 2023-01-06 RX ORDER — IPRATROPIUM/ALBUTEROL SULFATE 18-103MCG
3 AEROSOL WITH ADAPTER (GRAM) INHALATION EVERY 6 HOURS
Refills: 0 | Status: DISCONTINUED | OUTPATIENT
Start: 2023-01-06 | End: 2023-01-09

## 2023-01-06 RX ORDER — INSULIN LISPRO 100/ML
VIAL (ML) SUBCUTANEOUS EVERY 6 HOURS
Refills: 0 | Status: DISCONTINUED | OUTPATIENT
Start: 2023-01-06 | End: 2023-01-07

## 2023-01-06 RX ORDER — SODIUM CHLORIDE 9 MG/ML
1000 INJECTION INTRAMUSCULAR; INTRAVENOUS; SUBCUTANEOUS
Refills: 0 | Status: DISCONTINUED | OUTPATIENT
Start: 2023-01-06 | End: 2023-01-06

## 2023-01-06 RX ORDER — ACETYLCYSTEINE 200 MG/ML
3 VIAL (ML) MISCELLANEOUS EVERY 6 HOURS
Refills: 0 | Status: COMPLETED | OUTPATIENT
Start: 2023-01-06 | End: 2023-01-09

## 2023-01-06 RX ADMIN — Medication 25 MILLIGRAM(S): at 13:06

## 2023-01-06 RX ADMIN — TAMSULOSIN HYDROCHLORIDE 0.4 MILLIGRAM(S): 0.4 CAPSULE ORAL at 22:40

## 2023-01-06 RX ADMIN — Medication 3 MILLILITER(S): at 17:19

## 2023-01-06 RX ADMIN — Medication 3 MILLILITER(S): at 05:57

## 2023-01-06 RX ADMIN — PIPERACILLIN AND TAZOBACTAM 25 GRAM(S): 4; .5 INJECTION, POWDER, LYOPHILIZED, FOR SOLUTION INTRAVENOUS at 22:40

## 2023-01-06 RX ADMIN — Medication 25 MILLIGRAM(S): at 22:40

## 2023-01-06 RX ADMIN — SODIUM CHLORIDE 4 MILLILITER(S): 9 INJECTION INTRAMUSCULAR; INTRAVENOUS; SUBCUTANEOUS at 05:57

## 2023-01-06 RX ADMIN — Medication 25 MILLIGRAM(S): at 05:56

## 2023-01-06 RX ADMIN — Medication 5 MILLIGRAM(S): at 05:56

## 2023-01-06 RX ADMIN — SODIUM CHLORIDE 60 MILLILITER(S): 9 INJECTION INTRAMUSCULAR; INTRAVENOUS; SUBCUTANEOUS at 11:56

## 2023-01-06 RX ADMIN — Medication 3 MILLILITER(S): at 11:55

## 2023-01-06 RX ADMIN — MEMANTINE HYDROCHLORIDE 5 MILLIGRAM(S): 10 TABLET ORAL at 06:02

## 2023-01-06 RX ADMIN — Medication 0.25 MILLIGRAM(S): at 05:57

## 2023-01-06 RX ADMIN — Medication 100 MILLIGRAM(S): at 13:05

## 2023-01-06 RX ADMIN — PIPERACILLIN AND TAZOBACTAM 25 GRAM(S): 4; .5 INJECTION, POWDER, LYOPHILIZED, FOR SOLUTION INTRAVENOUS at 13:05

## 2023-01-06 RX ADMIN — Medication 0.25 MILLIGRAM(S): at 17:19

## 2023-01-06 RX ADMIN — Medication 100 MILLIGRAM(S): at 22:39

## 2023-01-06 RX ADMIN — POLYETHYLENE GLYCOL 3350 17 GRAM(S): 17 POWDER, FOR SOLUTION ORAL at 06:02

## 2023-01-06 RX ADMIN — Medication 2: at 17:01

## 2023-01-06 RX ADMIN — DONEPEZIL HYDROCHLORIDE 10 MILLIGRAM(S): 10 TABLET, FILM COATED ORAL at 22:39

## 2023-01-06 RX ADMIN — SENNA PLUS 2 TABLET(S): 8.6 TABLET ORAL at 22:40

## 2023-01-06 RX ADMIN — MEMANTINE HYDROCHLORIDE 5 MILLIGRAM(S): 10 TABLET ORAL at 17:19

## 2023-01-06 RX ADMIN — Medication 3 MILLILITER(S): at 02:52

## 2023-01-06 RX ADMIN — Medication 650 MILLIGRAM(S): at 20:15

## 2023-01-06 RX ADMIN — ENOXAPARIN SODIUM 40 MILLIGRAM(S): 100 INJECTION SUBCUTANEOUS at 17:19

## 2023-01-06 RX ADMIN — Medication 100 MILLIGRAM(S): at 05:57

## 2023-01-06 RX ADMIN — Medication 1 MILLIGRAM(S): at 13:05

## 2023-01-06 RX ADMIN — SODIUM CHLORIDE 4 MILLILITER(S): 9 INJECTION INTRAMUSCULAR; INTRAVENOUS; SUBCUTANEOUS at 17:19

## 2023-01-06 RX ADMIN — PIPERACILLIN AND TAZOBACTAM 25 GRAM(S): 4; .5 INJECTION, POWDER, LYOPHILIZED, FOR SOLUTION INTRAVENOUS at 06:18

## 2023-01-06 RX ADMIN — Medication 650 MILLIGRAM(S): at 21:03

## 2023-01-06 RX ADMIN — Medication 3 MILLILITER(S): at 10:31

## 2023-01-06 NOTE — CONSULT NOTE ADULT - PROBLEM SELECTOR RECOMMENDATION 9
Seen on 6LNC, previously on RA per flowsheet  -Sats 94%  -Congested cough with thick secretions via deep oral suctioning  -Duoneb q6h  -Start Mucomyst 20% 3ml q6h x3 days (give with Duoneb)  -Chest PT  -Suction PRN  -Check CT chest  -Keep sats >90% with O2 PRN. Wean O2 as tolerated.

## 2023-01-06 NOTE — PROGRESS NOTE ADULT - ASSESSMENT
HPI:   86yo M h/o dementia, Afib on Xarelto, presenting as transfer from [facility] after mechanical fall. Pt had a ground level fall yesterday, and was transferred here for higher level of care.  Got Kcentra at [facility]    Primary Survey:  A - airway intact  B - bilateral breath sounds and good chest rise  C - initial BP  BP: 134/62 (12-28-22 @ 18:32) *** , HR HR: 98 (12-28-22 @ 18:32) *** , Afib palpable pulses in all extremities  D - GCS 14 on arrival (confused)  Exposure obtained      Secondary Survey:  GEN: resting comfortably in bed, in NAD  HEENT: normocephalic, non-tender to palpation, no abrasions visible, no step-offs palpated  NECK: trachea midline, non-tender to palpation at posterior midline  CHEST: non-tender to palpation across clavicles and b/l anterior ribs  BACK: non-tender to palpation along cervical, thoracic, lumbar spine midline and b/l posterior ribs; no palpable step-offs or hematomas  ABD: soft, non-distended, non-tender   PELVIS: no pelvic instability  LUE: no visible abrasions or deformities, non-tender to palpation across upper and lower arm  RUE: no visible abrasions or deformities, non-tender to palpation across upper and lower arm  LLE: no visible abrasions or deformities, non-tender to palpation across upper and lower leg. Palpable PT  RLE: no visible abrasions or deformities, non-tender to palpation across upper and lower leg. Palpable PT  NEURO: AAOx0, CN II-IX grossly intact; pupils 3mm, equal and reactive to light bilaterally    ROS:  unable to obtain due to pt mental status     (28 Dec 2022 20:11)    PROCEDURE: Fusion, spine, thoracolumbar, posterior approach  s/p L1-L4 perc screw insertion for L2 fracture after a fall on 12/31      POD#  6   PLAN:  Neuro:   1 Out of bed   2 continue pt/ot   3 prn pain medication   4 continue aricept and namenda for dementia   5 family prefers to take patient home.    6 dc hemovac today   Respiratory:   1 secretions - will continue on duoneb, mucomyst and pulmicort -frequent suctioning   2 pulmonary saw -chest ct :p   3 1/6 cxr stable   4 on 2l nasal cannula   5 on zosyn for presumed pna     CV:  1 blood pressure stable     Endocrine:   1 dm- continue on sliding scale     Heme/Onc:             DVT ppx: sql and scds   1 anemia- chronic in nature     Renal:   1 retention - prn straight cath   2 continue flomax   3 ns @60 while npo   ID:   1 afebrile   2 wbc normal   3 id following - on zosyn for presumed pna     GI:   1 possible aspiration   2 npo -swallow to see   3 last bm 1/5   Social/Family:   Discharge planning: dc home with family once stable     -will discuss with Dr. Hector   -[place] [phone]          HPI:   88yo M h/o dementia, Afib on Xarelto, presenting as transfer from [facility] after mechanical fall. Pt had a ground level fall yesterday, and was transferred here for higher level of care.  Got Kcentra at [facility]    Primary Survey:  A - airway intact  B - bilateral breath sounds and good chest rise  C - initial BP  BP: 134/62 (12-28-22 @ 18:32) *** , HR HR: 98 (12-28-22 @ 18:32) *** , Afib palpable pulses in all extremities  D - GCS 14 on arrival (confused)  Exposure obtained      Secondary Survey:  GEN: resting comfortably in bed, in NAD  HEENT: normocephalic, non-tender to palpation, no abrasions visible, no step-offs palpated  NECK: trachea midline, non-tender to palpation at posterior midline  CHEST: non-tender to palpation across clavicles and b/l anterior ribs  BACK: non-tender to palpation along cervical, thoracic, lumbar spine midline and b/l posterior ribs; no palpable step-offs or hematomas  ABD: soft, non-distended, non-tender   PELVIS: no pelvic instability  LUE: no visible abrasions or deformities, non-tender to palpation across upper and lower arm  RUE: no visible abrasions or deformities, non-tender to palpation across upper and lower arm  LLE: no visible abrasions or deformities, non-tender to palpation across upper and lower leg. Palpable PT  RLE: no visible abrasions or deformities, non-tender to palpation across upper and lower leg. Palpable PT  NEURO: AAOx0, CN II-IX grossly intact; pupils 3mm, equal and reactive to light bilaterally    ROS:  unable to obtain due to pt mental status     (28 Dec 2022 20:11)    PROCEDURE: Fusion, spine, thoracolumbar, posterior approach  s/p L1-L4 perc screw insertion for L2 fracture after a fall on 12/31      POD#  6   PLAN:  Neuro:   1 Out of bed   2 continue pt/ot   3 prn pain medication   4 continue aricept and namenda for dementia   5 family prefers to take patient home.    6 dc hemovac today   Respiratory:   1 secretions - will continue on duoneb, mucomyst and pulmicort -frequent suctioning   2 pulmonary saw -chest ct :p   3 1/6 cxr stable   4 on 2l nasal cannula   5 on zosyn for presumed pna     CV:  1 blood pressure stable     Endocrine:   1 dm- continue on sliding scale     Heme/Onc:             DVT ppx: sql and scds   1 anemia- chronic in nature     Renal:   1 retention - prn straight cath   2 continue flomax   3 ns @60 while npo   ID:   1 afebrile   2 wbc normal   3 id following - on zosyn for presumed pna     GI:   1 possible aspiration   2 npo -swallow to see   3 last bm 1/5   Social/Family:   Discharge planning: dc home with family once stable     -will discuss with Dr. Hector   -[facility] [id number]       [facility]   patient will need transfer wheelchair due to patient's deconditioning and generalized weakness secondary to spinal fracture and fixation of lumbar spine. Patient will require a transport wheelchair. Patient is unable to self propel in a standard wheel chair . This is necessary to achieve daily tasks and therapies which cannot be achieved with the use of a cane or rolling walker. Patient and family are in agreement with transport chair use at home and assistance will be provided if needed.

## 2023-01-06 NOTE — PROGRESS NOTE ADULT - SUBJECTIVE AND OBJECTIVE BOX
CC: f/u for fever post-op    Patient resting comfortably in bed, no new c/o    REVIEW OF SYSTEMS: no fevers, no chills, no nausea, no vomiting, no abd pain, no resp symptoms  All other review of systems negative (Comprehensive ROS)    Antimicrobials Day # 5  piperacillin/tazobactam IVPB.. 3.375 Gram(s) IV Intermittent every 8 hours      Other Medications Reviewed    Vital Signs Last 24 Hrs  T(C): 36.7 (2023 05:00), Max: 37.5 (2023 12:53)  T(F): 98 (2023 05:00), Max: 99.5 (2023 12:53)  HR: 87 (2023 05:00) (87 - 96)  BP: 120/71 (2023 05:00) (106/71 - 125/67)  BP(mean): --  RR: 18 (2023 05:00) (18 - 29)  SpO2: 96% (2023 05:00) (93% - 97%)    Parameters below as of 2023 05:00  Patient On (Oxygen Delivery Method): room air          PHYSICAL EXAM:  General: alert, no acute distress  Eyes:  anicteric, no conjunctival injection, no discharge  Oropharynx: no lesions or injection 	  Neck: supple, without adenopathy  Lungs: diminished at bases  Heart: regular rate and rhythm; no murmur, rubs or gallops  Abdomen: soft, distended, nontender, without mass or organomegaly  Skin: no lesions, back incision clean, hemovac drain in place  Extremities: no clubbing, cyanosis, or edema  Neurologic: alert, follows commands, moves all extremities    LAB RESULTS:                        9.7    6.64  )-----------( 196      ( 2023 06:14 )             30.3   01-06    141  |  109<H>  |  32<H>  ----------------------------<  144<H>  4.0   |  23  |  0.83    Ca    8.3<L>      2023 06:14          Urinalysis Basic - ( 2023 22:28 )    Color: Yellow / Appearance: Clear / S.029 / pH: x  Gluc: x / Ketone: Negative  / Bili: Negative / Urobili: 2 mg/dL   Blood: x / Protein: Trace / Nitrite: Negative   Leuk Esterase: Negative / RBC: 6 /hpf / WBC 2 /HPF   Sq Epi: x / Non Sq Epi: 0 /hpf / Bacteria: Negative        MICROBIOLOGY REVIEWED:      Culture - Blood (23 @ 21:50)   Specimen Source: .Blood Blood-Peripheral   Culture Results:   No growth to date.   RADIOLOGY REVIEWED:    < from: Xray Abdomen 1 View PORTABLE -Urgent (Xray Abdomen 1 View PORTABLE -Urgent .) (23 @ 20:59) >    IMPRESSION:  Gaseous distention of the stomach. Otherwise, nonobstructive bowel gas   pattern.    --- End of Report ---    < end of copied text >

## 2023-01-06 NOTE — CONSULT NOTE ADULT - ASSESSMENT
86 y/o M with PMH of dementia, afib on Xarelto. Presents as a tx from [facility] s/p fall, found to have L2 spine fracture, RP hematoma. Now s/p L1-L4 lumbar fusion. Hospital course c/b postop hypotension requiring pressors, afib with RVR, pulm edema, dysphagia. Also with new fevers to 101.5 F on 1/2. CXR with mild congestion. Pulmonary called to consult for management of secretions, pt with wet cough.

## 2023-01-06 NOTE — CONSULT NOTE ADULT - PROBLEM SELECTOR RECOMMENDATION 2
Febrile to 101.5 1/2, still with intermittent low grade temps  -Leukocytosis 1/1, now normalized   -BC with NGTD   -RVP/COVID negative  -ABX per ID  -Suggest CT chest to r/o PNA, pt with silent aspiration on FEES. Febrile to 101.5 1/2, still with intermittent low grade temps  -Leukocytosis 1/1, now normalized   -BC with NGTD   -RVP/COVID negative  -Suggest CT chest to r/o PNA, pt with silent aspiration on FEES.  -ABX per ID, suggest continue until obtain CT chest

## 2023-01-06 NOTE — CONSULT NOTE ADULT - PROBLEM SELECTOR RECOMMENDATION 4
C/w aricept 10mg and namenda 5mg bid
S/p fall, found to have L2 spine fracture  -Sp L1-L4 lumbar fusion 12/31  -Per neurosurgery.

## 2023-01-06 NOTE — CONSULT NOTE ADULT - PROBLEM SELECTOR RECOMMENDATION 3
Pt with oropharyngeal dysphagia  -S/p FEES - silent aspiration thin liquids, laryngeal penetration of moderately thick liquids  -Diet per speech  -Aspiration precautions  -Oral care.

## 2023-01-06 NOTE — PROGRESS NOTE ADULT - ASSESSMENT
88y male with hx dementia, afib, BPH, HTN, HLD, and DM2 who presented as a transfer from [facility] after mechanical fall. He was found to have L2 fracture and associated L psoas hematoma. Admitted to [facility] 12/28, now s/p L1-L4 percutaneous fusion. Post op pulm edema 2/2 Afib rvr / Acute CHF, s/p lasix, nebs. He also found to have large hematoma within expanded left psoas muscle extending into the left iliacus muscle. He had fevers and hence ID eval requested. He is currently on IV Zosyn. Pt is unable to offer any specific c/o. He has been constipated as per family, no  symptoms, but had cough and SOB and felt warm   etiology of fever likely multifactorial possible due to atelectasis, constipation and large Left RP hematoma  he is also at risk for nosocomial PNA    SUGGEST:  cont empiric Zosyn day 5  f/u cultures   aspiration precautions, swallow studies  Pulm consult appreciated, awaiting CT chest to r/o PNA, although pt currently on RA, but at risk for aspiration  d/w pt's family at bedside

## 2023-01-06 NOTE — CHART NOTE - NSCHARTNOTEFT_GEN_A_CORE
Patient was seen at bedside. Patient's surgical drain was removed and dressing applied. Patient tolerated it well.

## 2023-01-06 NOTE — CONSULT NOTE ADULT - REASON FOR ADMISSION
Transfer level 2 trauma

## 2023-01-06 NOTE — CHART NOTE - NSCHARTNOTEFT_GEN_A_CORE
*Full note to follow     Impressions: Pt continues to present with an oropharyngeal dysphagia, though appears to safe to tolerate PO diet as recommended below. It is also important to note that FEES revealed patient silently aspirates; coughing during PO intake was related to secretions, not deglutitive. Cough is therefore not a reliable indicator/ sign of aspiration in this patient.     Recommendations:  1. Puree diet with mildly thick liquids. Only provide PO if patient is awake and alert. Check mouth frequently for oral pocketing.   2. Clinically monitor for signs of aspiration, i.e., change in respiratory status, increase in WBC, fevers, CXR findings. If noted, d/c PO intake and contact this service [phone]   3. Good oral care   4. Will continue to follow     Recs discussed with YON Obrien, [facility]-SLP pgr #[phone] 86yo M h/o dementia, Afib on Xarelto, presenting as transfer from [facility] after mechanical fall, found to have L2 fracture, and small associated L psoas hematoma. 12/30 Pt planned for spine surgery for L2 fx. The patient went down to OR and was audibly wheezing and having somewhat laboured breathing. Given that this was an acute change and no pulmonary evaluation had been undertaken anesthesia aborted the case pending pulmonary evaluation/clearance. Pulm subsequently saw pt->Wheezing may have been secondary to mild volume overload. 12/31: pt s/p L1-L4 percutaneous fusion.    Pt seen for initial bedside swallow evaluation on 1/1, with recommendations for a puree diet with moderately thick liquids. Pt with congested cough at that time which made behavioral analysis of swallow function difficult to a degree. Seen for follow-up on 1/4, with FEES recommended. S/p FEES on 1/5, with recommendations for a minced/moist diet with mildly thick liquids. Of note, pt's diet changed to minced/moist with moderately thick liquids.     Call received today from YON Corona; pt with pocketing during breakfast, required oral suctioning, pt also continues to require deep suctioning. Pt made NPO.     Pt seen for re-evaluation. Pt encountered in bed, initially asleep but easily rousable with tactile stimuli, on room air. Wife and daughter at bedside. Per pt's daughter, pt's dementia medications were restarted yesterday, which sometimes make pt a little bit more tired. Pt verbally responsive and able to follow simple directives. Pt provided with PO trials of puree textures, moderately thick liquids, and mildly thick liquids via tsp/ cup. Minced/moist trials deferred as pt clinically not tolerating during breakfast meal. Swallow function characterized by adequate bolus prep/transfer and palpable hyolaryngeal excursion. Instance of delayed cough noted during PO trials, consistent with pt's baseline cough. Overall, no change in swallow profile/ presentation from yesterday's assessment. Plan of care discussed with pt's family. Pt left in NAD.     Impressions: Pt continues to present with an oropharyngeal dysphagia, though appears to safe to tolerate PO diet as recommended below. It is also important to note that FEES revealed patient silently aspirates; coughing during PO intake was related to secretions, not deglutitive. Cough is therefore not a reliable indicator/ sign of aspiration in this patient.     Recommendations:  1. Puree diet with mildly thick liquids. Only provide PO if patient is awake and alert. Check mouth frequently for oral pocketing.   2. Clinically monitor for signs of aspiration, i.e., change in respiratory status, increase in WBC, fevers, CXR findings. If noted, d/c PO intake and contact this service [phone]   3. Good oral care   4. Will continue to follow     Recs discussed with YON Obrien, [facility]-SLP pgr #[phone]

## 2023-01-06 NOTE — PROGRESS NOTE ADULT - SUBJECTIVE AND OBJECTIVE BOX
SUBJECTIVE:   Patient was seen and evaluated at bedside. Patient is resting in bed and is in no new acute distress.   OVERNIGHT EVENTS:   none   Vital Signs Last 24 Hrs  T(C): 37.8 (06 Jan 2023 08:42), Max: 37.8 (06 Jan 2023 08:42)  T(F): 100.1 (06 Jan 2023 08:42), Max: 100.1 (06 Jan 2023 08:42)  HR: 97 (06 Jan 2023 08:42) (87 - 97)  BP: 111/62 (06 Jan 2023 08:42) (106/71 - 125/67)  BP(mean): --  RR: 22 (06 Jan 2023 08:42) (18 - 29)  SpO2: 94% (06 Jan 2023 08:42) (93% - 97%)    Parameters below as of 06 Jan 2023 08:42  Patient On (Oxygen Delivery Method): nasal cannula        PHYSICAL EXAM:    General: No Acute Distress     Neurological: awake alert oriented to person , place and tear with choices, hypophonic, follows commands uppers are 4/5, lowers antigravity, effort dependant, sensation wnl .   Pulmonary: Clear to Auscultation, No Rales, No Rhonchi, No Wheezes     Cardiovascular: S1, S2, Regular Rate and Rhythm     Gastrointestinal: Soft, Nontender, Nondistended     Incision:     LABS:                        9.7    6.64  )-----------( 196      ( 06 Jan 2023 06:14 )             30.3    01-06    141  |  109<H>  |  32<H>  ----------------------------<  144<H>  4.0   |  23  |  0.83    Ca    8.3<L>      06 Jan 2023 06:14          01-05 @ 07:01  -  01-06 @ 07:00  --------------------------------------------------------  IN: 440 mL / OUT: 610 mL / NET: -170 mL    01-06 @ 07:01  -  01-06 @ 11:08  --------------------------------------------------------  IN: 0 mL / OUT: 700 mL / NET: -700 mL      DRAINS: hmv 60 cc     MEDICATIONS:  Antibiotics:  piperacillin/tazobactam IVPB.. 3.375 Gram(s) IV Intermittent every 8 hours    Neuro:  acetaminophen     Tablet .. 650 milliGRAM(s) Oral every 6 hours PRN Temp greater or equal to 38C (100.4F), Mild Pain (1 - 3)  donepezil 10 milliGRAM(s) Oral at bedtime  memantine 5 milliGRAM(s) Oral two times a day  pregabalin 100 milliGRAM(s) Oral every 8 hours  traMADol 25 milliGRAM(s) Oral every 4 hours PRN Moderate Pain (4 - 6)    Cardiac:  metoprolol tartrate 25 milliGRAM(s) Oral every 8 hours    Pulm:  acetylcysteine 20%  Inhalation 3 milliLiter(s) Inhalation every 6 hours  albuterol/ipratropium for Nebulization 3 milliLiter(s) Nebulizer every 6 hours  buDESOnide    Inhalation Suspension 0.25 milliGRAM(s) Inhalation every 12 hours  sodium chloride 3%  Inhalation 4 milliLiter(s) Inhalation every 12 hours    GI/:  bisacodyl 5 milliGRAM(s) Oral every 12 hours  polyethylene glycol 3350 17 Gram(s) Oral every 12 hours  senna 2 Tablet(s) Oral at bedtime  tamsulosin 0.4 milliGRAM(s) Oral at bedtime    Other:   enoxaparin Injectable 40 milliGRAM(s) SubCutaneous <User Schedule>  folic acid 1 milliGRAM(s) Oral daily  insulin lispro (ADMELOG) corrective regimen sliding scale   SubCutaneous Before meals and at bedtime  sodium chloride 0.9%. 1000 milliLiter(s) IV Continuous <Continuous>    DIET: [] Regular [] CCD [] Renal [] Puree [] Dysphagia [] Tube Feeds:   npo for now   IMAGING:   ACC: [id number] EXAM:  XR SPINE 1 VIEW                          PROCEDURE DATE:  12/31/2022          INTERPRETATION:  CLINICAL INDICATION: Lumbar spine surgery;   intraoperative evaluation.    EXAM:  Limited intraoperative portable frontal and crosstable lateral views of   the lumbosacral spine at different stages of the procedure from   12/31/2022.    For purposes of this dictation, the lowest fully formed disc space is   considered to be L5-S1.    IMPRESSION:  Ultimately implanted L1-L4 level posterior spinal fusion hardware   consisting of pedicle screws with interconnecting rods.    Redemonstrated distracted inferior L2 fracture as on MRI study from   12/29/2022.    Generalized osteopenia and advanced multilevel degenerative disc changes   also apparent.    Also correlate with preoperative workup and intraoperative findings.    --- End of Report ---            RADHA WHYTE MD; Attending Radiologist  This document has been electronically signed. Dec 31 2022  7:35PM

## 2023-01-06 NOTE — CONSULT NOTE ADULT - SUBJECTIVE AND OBJECTIVE BOX
PULMONARY CONSULT    HPI: 86 y/o M with PMH of dementia, afib on Xarelto. Presents as a tx from [facility] s/p fall, found to have L2 spine fracture, RP hematoma. Now s/p L1-L4 lumbar fusion. Hospital course c/b postop hypotension requiring pressors, afib with RVR, pulm edema, dysphagia. Also with new fevers to 101.5 F on 1/2. CXR with mild congestion. Pulmonary called to consult for management of secretions, pt with wet cough.     PAST MEDICAL & SURGICAL HISTORY:  HTN (hypertension)  Prostate cancer  Hx of radiation therapy  Cataract of left eye  Borderline diabetes  Kidney cyst, acquired  Atrial fibrillation  Cervical disc displacement  Obesity (BMI 30-39.9)  Cataract extraction status of left eye  Larynx polyp    Allergies  aspirin (Rash)    FAMILY HISTORY:  FH: dementia (Mother)      Social history:     Review of Systems:  CONSTITUTIONAL: No fever, chills, or fatigue  EYES: No eye pain, visual disturbances, or discharge  ENMT:  No difficulty hearing, tinnitus, vertigo; No sinus or throat pain  NECK: No pain or stiffness  RESPIRATORY: Per above  CARDIOVASCULAR: No chest pain, palpitations, dizziness, or leg swelling  GASTROINTESTINAL: No abdominal or epigastric pain. No nausea, vomiting, or hematemesis; No diarrhea or constipation. No melena or hematochezia.  GENITOURINARY: No dysuria, frequency, hematuria, or incontinence  NEUROLOGICAL: No headaches, memory loss, loss of strength, numbness, or tremors  SKIN: No itching, burning, rashes, or lesions   MUSCULOSKELETAL: No joint pain or swelling; No muscle, back, or extremity pain  PSYCHIATRIC: No depression, anxiety, mood swings, or difficulty sleeping    Medications:  MEDICATIONS  (STANDING):  albuterol/ipratropium for Nebulization 3 milliLiter(s) Nebulizer every 4 hours  bisacodyl 5 milliGRAM(s) Oral every 12 hours  buDESOnide    Inhalation Suspension 0.25 milliGRAM(s) Inhalation every 12 hours  donepezil 10 milliGRAM(s) Oral at bedtime  enoxaparin Injectable 40 milliGRAM(s) SubCutaneous <User Schedule>  folic acid 1 milliGRAM(s) Oral daily  insulin lispro (ADMELOG) corrective regimen sliding scale   SubCutaneous Before meals and at bedtime  memantine 5 milliGRAM(s) Oral two times a day  metoprolol tartrate 25 milliGRAM(s) Oral every 8 hours  piperacillin/tazobactam IVPB.. 3.375 Gram(s) IV Intermittent every 8 hours  polyethylene glycol 3350 17 Gram(s) Oral every 12 hours  pregabalin 100 milliGRAM(s) Oral every 8 hours  senna 2 Tablet(s) Oral at bedtime  sodium chloride 3%  Inhalation 4 milliLiter(s) Inhalation every 12 hours  tamsulosin 0.4 milliGRAM(s) Oral at bedtime    MEDICATIONS  (PRN):  acetaminophen     Tablet .. 650 milliGRAM(s) Oral every 6 hours PRN Temp greater or equal to 38C (100.4F), Mild Pain (1 - 3)  traMADol 25 milliGRAM(s) Oral every 4 hours PRN Moderate Pain (4 - 6)    Vital Signs Last 24 Hrs  T(C): 36.7 (06 Jan 2023 05:00), Max: 37.5 (05 Jan 2023 12:53)  T(F): 98 (06 Jan 2023 05:00), Max: 99.5 (05 Jan 2023 12:53)  HR: 87 (06 Jan 2023 05:00) (87 - 96)  BP: 120/71 (06 Jan 2023 05:00) (106/71 - 125/67)  BP(mean): --  RR: 18 (06 Jan 2023 05:00) (18 - 29)  SpO2: 96% (06 Jan 2023 05:00) (93% - 97%)    Parameters below as of 06 Jan 2023 05:00  Patient On (Oxygen Delivery Method): room air    01-05 @ 07:01  -  01-06 @ 07:00  --------------------------------------------------------  IN: 440 mL / OUT: 610 mL / NET: -170 mL    LABS:                        9.7    6.64  )-----------( 196      ( 06 Jan 2023 06:14 )             30.3     01-06    141  |  109<H>  |  32<H>  ----------------------------<  144<H>  4.0   |  23  |  0.83    Ca    8.3<L>      06 Jan 2023 06:14    CAPILLARY BLOOD GLUCOSE  POCT Blood Glucose.: 134 mg/dL (06 Jan 2023 08:00)    CULTURES:     (collected 01-02-23 @ 21:50)  Source: .Blood Blood-Peripheral  Preliminary Report (01-04-23 @ 02:03):    No growth to date.     (collected 01-02-23 @ 21:35)  Source: .Blood Blood-Peripheral  Preliminary Report (01-04-23 @ 02:03):    No growth to date.    Physical Examination:    General: No acute distress.      HEENT: Pupils equal, reactive to light.  Symmetric.    PULM: Clear to auscultation bilaterally, no significant sputum production    CVS: S1, S2    ABD: Soft, nondistended, nontender, normoactive bowel sounds, no masses    EXT: No edema, nontender    SKIN: Warm and well perfused, no rashes noted.    NEURO: Alert, oriented, interactive, nonfocal    RADIOLOGY REVIEWED  CXR:    CT chest:    TTE:   PULMONARY CONSULT    HPI: 88 y/o M with PMH of dementia, afib on Xarelto. Presents as a tx from [facility] s/p fall, found to have L2 spine fracture, RP hematoma. Now s/p L1-L4 lumbar fusion. Hospital course c/b postop hypotension requiring pressors, afib with RVR, pulm edema, dysphagia. Also with new fevers to 101.5 F on 1/2. CXR with mild congestion. Pulmonary called to consult for management of secretions, pt with wet cough. Never smoker, no hx of lung disease. Wife at bedside assisting in history. Pt with congested cough, thick secretions suctioned orally. Unable to participate in ROS. O2 sats 94% on 6LNC.     PAST MEDICAL & SURGICAL HISTORY:  HTN (hypertension)  Prostate cancer  Hx of radiation therapy  Cataract of left eye  Borderline diabetes  Kidney cyst, acquired  Atrial fibrillation  Cervical disc displacement  Obesity (BMI 30-39.9)  Cataract extraction status of left eye  Larynx polyp    Allergies  aspirin (Rash)    FAMILY HISTORY:  FH: dementia (Mother)    Social history: never smoker    Review of Systems: unable to obtain    Medications:  MEDICATIONS  (STANDING):  albuterol/ipratropium for Nebulization 3 milliLiter(s) Nebulizer every 4 hours  bisacodyl 5 milliGRAM(s) Oral every 12 hours  buDESOnide    Inhalation Suspension 0.25 milliGRAM(s) Inhalation every 12 hours  donepezil 10 milliGRAM(s) Oral at bedtime  enoxaparin Injectable 40 milliGRAM(s) SubCutaneous <User Schedule>  folic acid 1 milliGRAM(s) Oral daily  insulin lispro (ADMELOG) corrective regimen sliding scale   SubCutaneous Before meals and at bedtime  memantine 5 milliGRAM(s) Oral two times a day  metoprolol tartrate 25 milliGRAM(s) Oral every 8 hours  piperacillin/tazobactam IVPB.. 3.375 Gram(s) IV Intermittent every 8 hours  polyethylene glycol 3350 17 Gram(s) Oral every 12 hours  pregabalin 100 milliGRAM(s) Oral every 8 hours  senna 2 Tablet(s) Oral at bedtime  sodium chloride 3%  Inhalation 4 milliLiter(s) Inhalation every 12 hours  tamsulosin 0.4 milliGRAM(s) Oral at bedtime    MEDICATIONS  (PRN):  acetaminophen     Tablet .. 650 milliGRAM(s) Oral every 6 hours PRN Temp greater or equal to 38C (100.4F), Mild Pain (1 - 3)  traMADol 25 milliGRAM(s) Oral every 4 hours PRN Moderate Pain (4 - 6)    Vital Signs Last 24 Hrs  T(C): 36.7 (06 Jan 2023 05:00), Max: 37.5 (05 Jan 2023 12:53)  T(F): 98 (06 Jan 2023 05:00), Max: 99.5 (05 Jan 2023 12:53)  HR: 87 (06 Jan 2023 05:00) (87 - 96)  BP: 120/71 (06 Jan 2023 05:00) (106/71 - 125/67)  BP(mean): --  RR: 18 (06 Jan 2023 05:00) (18 - 29)  SpO2: 96% (06 Jan 2023 05:00) (93% - 97%)    Parameters below as of 06 Jan 2023 05:00  Patient On (Oxygen Delivery Method): room air    01-05 @ 07:01  -  01-06 @ 07:00  --------------------------------------------------------  IN: 440 mL / OUT: 610 mL / NET: -170 mL    LABS:                        9.7    6.64  )-----------( 196      ( 06 Jan 2023 06:14 )             30.3     01-06    141  |  109<H>  |  32<H>  ----------------------------<  144<H>  4.0   |  23  |  0.83    Ca    8.3<L>      06 Jan 2023 06:14    CAPILLARY BLOOD GLUCOSE  POCT Blood Glucose.: 134 mg/dL (06 Jan 2023 08:00)    CULTURES:     (collected 01-02-23 @ 21:50)  Source: .Blood Blood-Peripheral  Preliminary Report (01-04-23 @ 02:03):    No growth to date.     (collected 01-02-23 @ 21:35)  Source: .Blood Blood-Peripheral  Preliminary Report (01-04-23 @ 02:03):    No growth to date.    Physical Examination:    General: No acute distress.      HEENT: Pupils equal, reactive to light.  Symmetric.    PULM: b/l rhonchi     CVS: S1, S2    ABD: Soft, nondistended, nontender, normoactive bowel sounds, no masses    EXT: No edema, nontender    SKIN: Warm and well perfused, no rashes noted.    NEURO: Alert    RADIOLOGY REVIEWED  CXR: mild congestion, f/u official read        PULMONARY CONSULT    HPI: 86 y/o M with PMH of dementia, afib on Xarelto. Presents as a tx from [facility] s/p fall, found to have L2 spine fracture, RP hematoma. Now s/p L1-L4 lumbar fusion. Hospital course c/b postop hypotension requiring pressors, afib with RVR, pulm edema, dysphagia. Also with new fevers to 101.5 F on 1/2. CXR with mild congestion. Pulmonary called to consult for management of secretions, pt with wet cough. Never smoker, no hx of lung disease. Wife at bedside assisting in history. Pt with congested cough, thick secretions suctioned orally. Unable to participate in ROS. O2 sats 94% on 6LNC.     PAST MEDICAL & SURGICAL HISTORY:  HTN (hypertension)  Prostate cancer  Hx of radiation therapy  Cataract of left eye  Borderline diabetes  Kidney cyst, acquired  Atrial fibrillation  Cervical disc displacement  Obesity (BMI 30-39.9)  Cataract extraction status of left eye  Larynx polyp    Allergies  aspirin (Rash)    FAMILY HISTORY:  FH: dementia (Mother)    Social history: never smoker    Review of Systems: unable to obtain    Medications:  MEDICATIONS  (STANDING):  albuterol/ipratropium for Nebulization 3 milliLiter(s) Nebulizer every 4 hours  bisacodyl 5 milliGRAM(s) Oral every 12 hours  buDESOnide    Inhalation Suspension 0.25 milliGRAM(s) Inhalation every 12 hours  donepezil 10 milliGRAM(s) Oral at bedtime  enoxaparin Injectable 40 milliGRAM(s) SubCutaneous <User Schedule>  folic acid 1 milliGRAM(s) Oral daily  insulin lispro (ADMELOG) corrective regimen sliding scale   SubCutaneous Before meals and at bedtime  memantine 5 milliGRAM(s) Oral two times a day  metoprolol tartrate 25 milliGRAM(s) Oral every 8 hours  piperacillin/tazobactam IVPB.. 3.375 Gram(s) IV Intermittent every 8 hours  polyethylene glycol 3350 17 Gram(s) Oral every 12 hours  pregabalin 100 milliGRAM(s) Oral every 8 hours  senna 2 Tablet(s) Oral at bedtime  sodium chloride 3%  Inhalation 4 milliLiter(s) Inhalation every 12 hours  tamsulosin 0.4 milliGRAM(s) Oral at bedtime    MEDICATIONS  (PRN):  acetaminophen     Tablet .. 650 milliGRAM(s) Oral every 6 hours PRN Temp greater or equal to 38C (100.4F), Mild Pain (1 - 3)  traMADol 25 milliGRAM(s) Oral every 4 hours PRN Moderate Pain (4 - 6)    Vital Signs Last 24 Hrs  T(C): 36.7 (06 Jan 2023 05:00), Max: 37.5 (05 Jan 2023 12:53)  T(F): 98 (06 Jan 2023 05:00), Max: 99.5 (05 Jan 2023 12:53)  HR: 87 (06 Jan 2023 05:00) (87 - 96)  BP: 120/71 (06 Jan 2023 05:00) (106/71 - 125/67)  BP(mean): --  RR: 18 (06 Jan 2023 05:00) (18 - 29)  SpO2: 96% (06 Jan 2023 05:00) (93% - 97%)    Parameters below as of 06 Jan 2023 05:00  Patient On (Oxygen Delivery Method): room air    01-05 @ 07:01  -  01-06 @ 07:00  --------------------------------------------------------  IN: 440 mL / OUT: 610 mL / NET: -170 mL    LABS:                        9.7    6.64  )-----------( 196      ( 06 Jan 2023 06:14 )             30.3     01-06    141  |  109<H>  |  32<H>  ----------------------------<  144<H>  4.0   |  23  |  0.83    Ca    8.3<L>      06 Jan 2023 06:14    CAPILLARY BLOOD GLUCOSE  POCT Blood Glucose.: 134 mg/dL (06 Jan 2023 08:00)    CULTURES:     (collected 01-02-23 @ 21:50)  Source: .Blood Blood-Peripheral  Preliminary Report (01-04-23 @ 02:03):    No growth to date.     (collected 01-02-23 @ 21:35)  Source: .Blood Blood-Peripheral  Preliminary Report (01-04-23 @ 02:03):    No growth to date.    Physical Examination:    General: No acute distress.      HEENT: Pupils equal, reactive to light.  Symmetric.    PULM: b/l rhonchi     CVS: S1, S2    ABD: Soft, nondistended, nontender, normoactive bowel sounds, no masses    EXT: No edema, nontender    SKIN: Warm and well perfused, no rashes noted.    NEURO: Alert    RADIOLOGY REVIEWED  CXR: grossly clear

## 2023-01-06 NOTE — CONSULT NOTE ADULT - CONSULT REASON
L2 fx
Medical management
Preop evaluation
fever
Resp distress and spine fracture
Wheezing / preop eval for L2 fx
secretion management

## 2023-01-06 NOTE — CONSULT NOTE ADULT - PROBLEM SELECTOR RECOMMENDATION 7
W/ episodes of RVR  -AC held 2nd psoas hematoma, on DVT ppx  -Cards f/u.
A1C 6.8   C/w SS  Takes metformin 500mg at home   DM well controlled, PMD follow up for medicaiton optimization, would not discharge on metformin

## 2023-01-06 NOTE — CONSULT NOTE ADULT - CONSULT REQUESTED DATE/TIME
06-Jan-2023 10:29
29-Dec-2022 18:01
28-Dec-2022 19:41
30-Dec-2022 10:32
30-Dec-2022 11:27
03-Jan-2023 14:25
29-Dec-2022 12:44

## 2023-01-07 LAB
GLUCOSE BLDC GLUCOMTR-MCNC: 142 MG/DL — HIGH (ref 70–99)
GLUCOSE BLDC GLUCOMTR-MCNC: 145 MG/DL — HIGH (ref 70–99)
GLUCOSE BLDC GLUCOMTR-MCNC: 169 MG/DL — HIGH (ref 70–99)
GLUCOSE BLDC GLUCOMTR-MCNC: 178 MG/DL — HIGH (ref 70–99)
GLUCOSE BLDC GLUCOMTR-MCNC: 182 MG/DL — HIGH (ref 70–99)
GLUCOSE BLDC GLUCOMTR-MCNC: 247 MG/DL — HIGH (ref 70–99)
PROCALCITONIN SERPL-MCNC: 0.16 NG/ML — HIGH (ref 0.02–0.1)

## 2023-01-07 RX ORDER — DEXTROSE 50 % IN WATER 50 %
15 SYRINGE (ML) INTRAVENOUS ONCE
Refills: 0 | Status: DISCONTINUED | OUTPATIENT
Start: 2023-01-07 | End: 2023-01-10

## 2023-01-07 RX ORDER — DEXTROSE 50 % IN WATER 50 %
12.5 SYRINGE (ML) INTRAVENOUS ONCE
Refills: 0 | Status: DISCONTINUED | OUTPATIENT
Start: 2023-01-07 | End: 2023-01-10

## 2023-01-07 RX ORDER — SODIUM CHLORIDE 9 MG/ML
1000 INJECTION, SOLUTION INTRAVENOUS
Refills: 0 | Status: DISCONTINUED | OUTPATIENT
Start: 2023-01-07 | End: 2023-01-10

## 2023-01-07 RX ORDER — DEXTROSE 50 % IN WATER 50 %
25 SYRINGE (ML) INTRAVENOUS ONCE
Refills: 0 | Status: DISCONTINUED | OUTPATIENT
Start: 2023-01-07 | End: 2023-01-10

## 2023-01-07 RX ORDER — INSULIN LISPRO 100/ML
VIAL (ML) SUBCUTANEOUS AT BEDTIME
Refills: 0 | Status: DISCONTINUED | OUTPATIENT
Start: 2023-01-07 | End: 2023-01-10

## 2023-01-07 RX ORDER — PIPERACILLIN AND TAZOBACTAM 4; .5 G/20ML; G/20ML
3.38 INJECTION, POWDER, LYOPHILIZED, FOR SOLUTION INTRAVENOUS EVERY 8 HOURS
Refills: 0 | Status: DISCONTINUED | OUTPATIENT
Start: 2023-01-07 | End: 2023-01-07

## 2023-01-07 RX ORDER — GLUCAGON INJECTION, SOLUTION 0.5 MG/.1ML
1 INJECTION, SOLUTION SUBCUTANEOUS ONCE
Refills: 0 | Status: DISCONTINUED | OUTPATIENT
Start: 2023-01-07 | End: 2023-01-10

## 2023-01-07 RX ORDER — INSULIN LISPRO 100/ML
VIAL (ML) SUBCUTANEOUS
Refills: 0 | Status: DISCONTINUED | OUTPATIENT
Start: 2023-01-07 | End: 2023-01-10

## 2023-01-07 RX ADMIN — PIPERACILLIN AND TAZOBACTAM 25 GRAM(S): 4; .5 INJECTION, POWDER, LYOPHILIZED, FOR SOLUTION INTRAVENOUS at 05:48

## 2023-01-07 RX ADMIN — MEMANTINE HYDROCHLORIDE 5 MILLIGRAM(S): 10 TABLET ORAL at 05:48

## 2023-01-07 RX ADMIN — Medication 25 MILLIGRAM(S): at 21:46

## 2023-01-07 RX ADMIN — DONEPEZIL HYDROCHLORIDE 10 MILLIGRAM(S): 10 TABLET, FILM COATED ORAL at 21:44

## 2023-01-07 RX ADMIN — ENOXAPARIN SODIUM 40 MILLIGRAM(S): 100 INJECTION SUBCUTANEOUS at 17:13

## 2023-01-07 RX ADMIN — Medication 3 MILLILITER(S): at 23:46

## 2023-01-07 RX ADMIN — Medication 0.25 MILLIGRAM(S): at 05:50

## 2023-01-07 RX ADMIN — Medication 3 MILLILITER(S): at 11:34

## 2023-01-07 RX ADMIN — SODIUM CHLORIDE 4 MILLILITER(S): 9 INJECTION INTRAMUSCULAR; INTRAVENOUS; SUBCUTANEOUS at 17:12

## 2023-01-07 RX ADMIN — Medication 3 MILLILITER(S): at 05:47

## 2023-01-07 RX ADMIN — Medication 100 MILLIGRAM(S): at 13:14

## 2023-01-07 RX ADMIN — Medication 1 MILLIGRAM(S): at 11:35

## 2023-01-07 RX ADMIN — POLYETHYLENE GLYCOL 3350 17 GRAM(S): 17 POWDER, FOR SOLUTION ORAL at 05:48

## 2023-01-07 RX ADMIN — Medication 5 MILLIGRAM(S): at 05:48

## 2023-01-07 RX ADMIN — Medication 3 MILLILITER(S): at 00:32

## 2023-01-07 RX ADMIN — Medication 3 MILLILITER(S): at 00:33

## 2023-01-07 RX ADMIN — SODIUM CHLORIDE 4 MILLILITER(S): 9 INJECTION INTRAMUSCULAR; INTRAVENOUS; SUBCUTANEOUS at 05:47

## 2023-01-07 RX ADMIN — MEMANTINE HYDROCHLORIDE 5 MILLIGRAM(S): 10 TABLET ORAL at 17:13

## 2023-01-07 RX ADMIN — Medication 0.25 MILLIGRAM(S): at 17:12

## 2023-01-07 RX ADMIN — TAMSULOSIN HYDROCHLORIDE 0.4 MILLIGRAM(S): 0.4 CAPSULE ORAL at 21:44

## 2023-01-07 RX ADMIN — Medication 2: at 00:32

## 2023-01-07 RX ADMIN — Medication 3 MILLILITER(S): at 17:12

## 2023-01-07 RX ADMIN — Medication 100 MILLIGRAM(S): at 05:48

## 2023-01-07 RX ADMIN — Medication 25 MILLIGRAM(S): at 13:14

## 2023-01-07 RX ADMIN — Medication 25 MILLIGRAM(S): at 05:48

## 2023-01-07 RX ADMIN — Medication 2: at 11:34

## 2023-01-07 RX ADMIN — Medication 3 MILLILITER(S): at 17:11

## 2023-01-07 RX ADMIN — Medication 100 MILLIGRAM(S): at 21:46

## 2023-01-07 NOTE — PROGRESS NOTE ADULT - SUBJECTIVE AND OBJECTIVE BOX
CC: f/u for fever post-op    Patient resting comfortably in bed, no new c/o    REVIEW OF SYSTEMS: no fevers, no chills, no nausea, no vomiting, no abd pain, no resp symptoms  All other review of systems negative (Comprehensive ROS)    Antimicrobials Day # 6  piperacillin/tazobactam IVPB.. 3.375 Gram(s) IV Intermittent every 8 hours        Other Medications Reviewed    Vital Signs Last 24 Hrs  T(C): 37.2 (2023 08:53), Max: 37.4 (2023 18:33)  T(F): 98.9 (2023 08:53), Max: 99.3 (2023 18:33)  HR: 69 (2023 08:53) (69 - 101)  BP: 106/65 (2023 08:53) (106/65 - 124/70)  BP(mean): --  RR: 18 (2023 08:53) (18 - 22)  SpO2: 98% (2023 08:53) (93% - 98%)    Parameters below as of 2023 08:53  Patient On (Oxygen Delivery Method): room air              PHYSICAL EXAM:  General: alert, no acute distress  Eyes:  anicteric, no conjunctival injection, no discharge  Oropharynx: no lesions or injection 	  Neck: supple, without adenopathy  Lungs: diminished at bases  Heart: regular rate and rhythm; no murmur, rubs or gallops  Abdomen: soft, distended, nontender, without mass or organomegaly  Skin: no lesions, back incision clean, drain removed  Extremities: no clubbing, cyanosis, or edema  Neurologic: alert, follows commands, moves all extremities    LAB RESULTS:                                   9.7    6.64  )-----------( 196      ( 2023 06:14 )             30.3   01-06    141  |  109<H>  |  32<H>  ----------------------------<  144<H>  4.0   |  23  |  0.83    Ca    8.3<L>      2023 06:14            Urinalysis Basic - ( 2023 22:28 )    Color: Yellow / Appearance: Clear / S.029 / pH: x  Gluc: x / Ketone: Negative  / Bili: Negative / Urobili: 2 mg/dL   Blood: x / Protein: Trace / Nitrite: Negative   Leuk Esterase: Negative / RBC: 6 /hpf / WBC 2 /HPF   Sq Epi: x / Non Sq Epi: 0 /hpf / Bacteria: Negative        MICROBIOLOGY REVIEWED:      Culture - Blood (23 @ 21:50)   Specimen Source: .Blood Blood-Peripheral   Culture Results:   No growth to date.   RADIOLOGY REVIEWED:  < from: CT Chest No Cont (23 @ 17:56) >      < end of copied text >    < from: Xray Abdomen 1 View PORTABLE -Urgent (Xray Abdomen 1 View PORTABLE -Urgent .) (23 @ 20:59) >    IMPRESSION:  Gaseous distention of the stomach. Otherwise, nonobstructive bowel gas   pattern.    --- End of Report ---    < end of copied text >   CC: f/u for fever post-op    Patient resting comfortably in bed, no new c/o    REVIEW OF SYSTEMS: no fevers, no chills, no nausea, no vomiting, no abd pain, no resp symptoms  All other review of systems negative (Comprehensive ROS)    Antimicrobials Day # 6  piperacillin/tazobactam IVPB.. 3.375 Gram(s) IV Intermittent every 8 hours        Other Medications Reviewed    Vital Signs Last 24 Hrs  T(C): 37.2 (2023 08:53), Max: 37.4 (2023 18:33)  T(F): 98.9 (2023 08:53), Max: 99.3 (2023 18:33)  HR: 69 (2023 08:53) (69 - 101)  BP: 106/65 (2023 08:53) (106/65 - 124/70)  BP(mean): --  RR: 18 (2023 08:53) (18 - 22)  SpO2: 98% (2023 08:53) (93% - 98%)    Parameters below as of 2023 08:53  Patient On (Oxygen Delivery Method): room air              PHYSICAL EXAM:  General: alert, no acute distress  Eyes:  anicteric, no conjunctival injection, no discharge  Oropharynx: no lesions or injection 	  Neck: supple, without adenopathy  Lungs: diminished at bases  Heart: regular rate and rhythm; no murmur, rubs or gallops  Abdomen: soft, distended, nontender, without mass or organomegaly  Skin: no lesions, back incision clean, drain removed  Extremities: no clubbing, cyanosis, or edema  Neurologic: alert, follows commands, moves all extremities    LAB RESULTS:                                   9.7    6.64  )-----------( 196      ( 2023 06:14 )             30.3   01-06    141  |  109<H>  |  32<H>  ----------------------------<  144<H>  4.0   |  23  |  0.83    Ca    8.3<L>      2023 06:14            Urinalysis Basic - ( 2023 22:28 )    Color: Yellow / Appearance: Clear / S.029 / pH: x  Gluc: x / Ketone: Negative  / Bili: Negative / Urobili: 2 mg/dL   Blood: x / Protein: Trace / Nitrite: Negative   Leuk Esterase: Negative / RBC: 6 /hpf / WBC 2 /HPF   Sq Epi: x / Non Sq Epi: 0 /hpf / Bacteria: Negative        MICROBIOLOGY REVIEWED:      Culture - Blood (23 @ 21:50)   Specimen Source: .Blood Blood-Peripheral   Culture Results:   No growth to date.   RADIOLOGY REVIEWED:  < from: CT Chest No Cont (23 @ 17:56) >        INTERPRETATION:  Small bilateral pleural effusions.    < end of copied text >    < end of copied text >    < from: Xray Abdomen 1 View PORTABLE -Urgent (Xray Abdomen 1 View PORTABLE -Urgent .) (23 @ 20:59) >    IMPRESSION:  Gaseous distention of the stomach. Otherwise, nonobstructive bowel gas   pattern.    --- End of Report ---    < end of copied text >

## 2023-01-07 NOTE — PROGRESS NOTE ADULT - PROBLEM SELECTOR PLAN 8
Secretions as above, improving today  -CT chest with small b/l pl effusions, no clear PNA. F/u official report  -ABX per ID. Secretions as above, improving today  -CT chest with small b/l pl effusions, no clear PNA. F/u official report  -ABX per ID, can likely d/c.

## 2023-01-07 NOTE — PROGRESS NOTE ADULT - SUBJECTIVE AND OBJECTIVE BOX
Follow-up Pulm Progress Note    Awake, alert  Secretions, cough much improved  Denies CP, SOB  O2 sats 97% RA    Medications:  MEDICATIONS  (STANDING):  acetylcysteine 20%  Inhalation 3 milliLiter(s) Inhalation every 6 hours  albuterol/ipratropium for Nebulization 3 milliLiter(s) Nebulizer every 6 hours  bisacodyl 5 milliGRAM(s) Oral every 12 hours  buDESOnide    Inhalation Suspension 0.25 milliGRAM(s) Inhalation every 12 hours  dextrose 5%. 1000 milliLiter(s) (50 mL/Hr) IV Continuous <Continuous>  dextrose 5%. 1000 milliLiter(s) (100 mL/Hr) IV Continuous <Continuous>  dextrose 50% Injectable 25 Gram(s) IV Push once  dextrose 50% Injectable 12.5 Gram(s) IV Push once  dextrose 50% Injectable 25 Gram(s) IV Push once  donepezil 10 milliGRAM(s) Oral at bedtime  enoxaparin Injectable 40 milliGRAM(s) SubCutaneous <User Schedule>  folic acid 1 milliGRAM(s) Oral daily  glucagon  Injectable 1 milliGRAM(s) IntraMuscular once  insulin lispro (ADMELOG) corrective regimen sliding scale   SubCutaneous three times a day before meals  insulin lispro (ADMELOG) corrective regimen sliding scale   SubCutaneous at bedtime  memantine 5 milliGRAM(s) Oral two times a day  metoprolol tartrate 25 milliGRAM(s) Oral every 8 hours  polyethylene glycol 3350 17 Gram(s) Oral every 12 hours  pregabalin 100 milliGRAM(s) Oral every 8 hours  senna 2 Tablet(s) Oral at bedtime  sodium chloride 3%  Inhalation 4 milliLiter(s) Inhalation every 12 hours  tamsulosin 0.4 milliGRAM(s) Oral at bedtime    MEDICATIONS  (PRN):  acetaminophen     Tablet .. 650 milliGRAM(s) Oral every 6 hours PRN Temp greater or equal to 38C (100.4F), Mild Pain (1 - 3)  dextrose Oral Gel 15 Gram(s) Oral once PRN Blood Glucose LESS THAN 70 milliGRAM(s)/deciliter  traMADol 25 milliGRAM(s) Oral every 4 hours PRN Moderate Pain (4 - 6)    Vital Signs Last 24 Hrs  T(C): 37.2 (07 Jan 2023 08:53), Max: 37.4 (06 Jan 2023 18:33)  T(F): 98.9 (07 Jan 2023 08:53), Max: 99.3 (06 Jan 2023 18:33)  HR: 69 (07 Jan 2023 08:53) (69 - 101)  BP: 106/65 (07 Jan 2023 08:53) (106/65 - 124/70)  BP(mean): --  RR: 18 (07 Jan 2023 08:53) (18 - 22)  SpO2: 98% (07 Jan 2023 08:53) (93% - 98%)    Parameters below as of 07 Jan 2023 08:53  Patient On (Oxygen Delivery Method): room air    01-06 @ 07:01  -  01-07 @ 07:00  --------------------------------------------------------  IN: 710 mL / OUT: 1300 mL / NET: -590 mL    LABS:                        9.7    6.64  )-----------( 196      ( 06 Jan 2023 06:14 )             30.3     01-06    141  |  109<H>  |  32<H>  ----------------------------<  144<H>  4.0   |  23  |  0.83    Ca    8.3<L>      06 Jan 2023 06:14    CAPILLARY BLOOD GLUCOSE  POCT Blood Glucose.: 247 mg/dL (07 Jan 2023 07:32)    Procalcitonin, Serum: 0.16 ng/mL (01-07-23 @ 07:23)    CULTURES:     Culture - Blood (collected 01-02-23 @ 21:50)  Source: .Blood Blood-Peripheral  Preliminary Report (01-04-23 @ 02:03):    No growth to date.    Culture - Blood (collected 01-02-23 @ 21:35)  Source: .Blood Blood-Peripheral  Preliminary Report (01-04-23 @ 02:03):    No growth to date.    Physical Examination:  PULM: Few scattered rhonchi, clears with cough  CVS: S1, S2 heard    RADIOLOGY REVIEWED  CXR: grossly clear

## 2023-01-07 NOTE — PROGRESS NOTE ADULT - NS ATTEND AMEND GEN_ALL_CORE FT
ct neg for pna  pt on ra doing ok  suggest keep sat>90% w o2 as needed  observe off abx  dw pt and wife

## 2023-01-07 NOTE — PROGRESS NOTE ADULT - NUTRITIONAL ASSESSMENT
- - - Problem/Recommendation - 1:  ·  Problem: Shortness of breath.   ·  Recommendation: Seen on 6LNC, previously on RA per flowsheet  -Sats 94%  -Congested cough with thick secretions via deep oral suctioning  -Duoneb q6h  -Start Mucomyst 20% 3ml q6h x3 days (give with Duoneb)  -Chest PT  -Suction PRN  -Check CT chest  -Keep sats >90% with O2 PRN. Wean O2 as tolerated.     Problem/Recommendation - 2:  ·  Problem: Fever.   ·  Recommendation: Febrile to 101.5 1/2, still with intermittent low grade temps  -Leukocytosis 1/1, now normalized   -BC with NGTD   -RVP/COVID negative  -Suggest CT chest to r/o PNA, pt with silent aspiration on FEES.  -ABX per ID, suggest continue until obtain CT chest.     Problem/Recommendation - 3:  ·  Problem: Dysphagia.   ·  Recommendation: Pt with oropharyngeal dysphagia  -S/p FEES - silent aspiration thin liquids, laryngeal penetration of moderately thick liquids  -Diet per speech  -Aspiration precautions  -Oral care.     Problem/Recommendation - 4:  ·  Problem: Fusion of lumbar spine.   ·  Recommendation: S/p fall, found to have L2 spine fracture  -Sp L1-L4 lumbar fusion 12/31  -Per neurosurgery.     Problem/Recommendation - 5:  ·  Problem: Hematoma.   ·  Recommendation: CT with L psoas muscle hematoma  -AC for afib held, on DVT ppx  -Monitor H/H.     Problem/Recommendation - 6:  ·  Problem: Dementia.   ·  Recommendation: Per hx  -Supportive care per primary team.     Problem/Recommendation - 7:  ·  Problem: Afib.   ·  Recommendation: W/ episodes of RVR  -AC held 2nd psoas hematoma, on DVT ppx  -Cards f/u.     Problem/Recommendation - 8:  ·  Problem: R/O Pneumonia.   ·  Recommendation: As above  -F/u CT chest  -C/w ABX.

## 2023-01-07 NOTE — PROGRESS NOTE ADULT - SUBJECTIVE AND OBJECTIVE BOX
Patient is a 88y old  Male who presents with a chief complaint of Transfer level 2 trauma (07 Jan 2023 10:20)      SUBJECTIVE / OVERNIGHT EVENTS:    Patient seen and examined. no cp sob. wife at bedside on RA. afebrile past 24 hrs.      Vital Signs Last 24 Hrs  T(C): 37.2 (07 Jan 2023 08:53), Max: 37.4 (06 Jan 2023 18:33)  T(F): 98.9 (07 Jan 2023 08:53), Max: 99.3 (06 Jan 2023 18:33)  HR: 69 (07 Jan 2023 08:53) (69 - 101)  BP: 106/65 (07 Jan 2023 08:53) (106/65 - 124/70)  BP(mean): --  RR: 18 (07 Jan 2023 08:53) (18 - 22)  SpO2: 98% (07 Jan 2023 08:53) (93% - 98%)    Parameters below as of 07 Jan 2023 08:53  Patient On (Oxygen Delivery Method): room air      I&O's Summary    06 Jan 2023 07:01  -  07 Jan 2023 07:00  --------------------------------------------------------  IN: 710 mL / OUT: 1300 mL / NET: -590 mL        PE:  GENERAL: NAD, AAOx1-2  CHEST/LUNG: CTABL, No wheeze  HEART: Regular rate and rhythm; no murmur  ABDOMEN: Soft, Nontender, Nondistended; Bowel sounds present  EXTREMITIES:  2+ Peripheral Pulses, No edema  NEURO: No focal deficits    LABS:                        9.7    6.64  )-----------( 196      ( 06 Jan 2023 06:14 )             30.3     01-06    141  |  109<H>  |  32<H>  ----------------------------<  144<H>  4.0   |  23  |  0.83    Ca    8.3<L>      06 Jan 2023 06:14        CAPILLARY BLOOD GLUCOSE      POCT Blood Glucose.: 169 mg/dL (07 Jan 2023 11:30)  POCT Blood Glucose.: 247 mg/dL (07 Jan 2023 07:32)  POCT Blood Glucose.: 145 mg/dL (07 Jan 2023 05:43)  POCT Blood Glucose.: 182 mg/dL (07 Jan 2023 00:25)  POCT Blood Glucose.: 156 mg/dL (06 Jan 2023 16:32)            RADIOLOGY & ADDITIONAL TESTS:    Imaging Personally Reviewed:  [x] YES  [ ] NO    Consultant(s) Notes Reviewed:  [x] YES  [ ] NO    MEDICATIONS  (STANDING):  acetylcysteine 20%  Inhalation 3 milliLiter(s) Inhalation every 6 hours  albuterol/ipratropium for Nebulization 3 milliLiter(s) Nebulizer every 6 hours  bisacodyl 5 milliGRAM(s) Oral every 12 hours  buDESOnide    Inhalation Suspension 0.25 milliGRAM(s) Inhalation every 12 hours  dextrose 5%. 1000 milliLiter(s) (50 mL/Hr) IV Continuous <Continuous>  dextrose 5%. 1000 milliLiter(s) (100 mL/Hr) IV Continuous <Continuous>  dextrose 50% Injectable 25 Gram(s) IV Push once  dextrose 50% Injectable 12.5 Gram(s) IV Push once  dextrose 50% Injectable 25 Gram(s) IV Push once  donepezil 10 milliGRAM(s) Oral at bedtime  enoxaparin Injectable 40 milliGRAM(s) SubCutaneous <User Schedule>  folic acid 1 milliGRAM(s) Oral daily  glucagon  Injectable 1 milliGRAM(s) IntraMuscular once  insulin lispro (ADMELOG) corrective regimen sliding scale   SubCutaneous three times a day before meals  insulin lispro (ADMELOG) corrective regimen sliding scale   SubCutaneous at bedtime  memantine 5 milliGRAM(s) Oral two times a day  metoprolol tartrate 25 milliGRAM(s) Oral every 8 hours  polyethylene glycol 3350 17 Gram(s) Oral every 12 hours  pregabalin 100 milliGRAM(s) Oral every 8 hours  senna 2 Tablet(s) Oral at bedtime  sodium chloride 3%  Inhalation 4 milliLiter(s) Inhalation every 12 hours  tamsulosin 0.4 milliGRAM(s) Oral at bedtime    MEDICATIONS  (PRN):  acetaminophen     Tablet .. 650 milliGRAM(s) Oral every 6 hours PRN Temp greater or equal to 38C (100.4F), Mild Pain (1 - 3)  dextrose Oral Gel 15 Gram(s) Oral once PRN Blood Glucose LESS THAN 70 milliGRAM(s)/deciliter  traMADol 25 milliGRAM(s) Oral every 4 hours PRN Moderate Pain (4 - 6)      Care Discussed with Consultants/Other Providers [x] YES  [ ] NO    HEALTH ISSUES - PROBLEM Dx:  Compression fracture of L2    Hematoma    Dementia    Hypertension    Chronic atrial fibrillation    Diabetes mellitus    Hyperlipemia    Medication management    Preoperative clearance    Afib    Fusion of lumbar spine    Fever    Dysphagia    Shortness of breath

## 2023-01-07 NOTE — PROGRESS NOTE ADULT - ASSESSMENT
88y male with hx dementia, afib, BPH, HTN, HLD, and DM2 who presented as a transfer from [facility] after mechanical fall. He was found to have L2 fracture and associated L psoas hematoma. Admitted to [facility] 12/28, now s/p L1-L4 percutaneous fusion. Post op pulm edema 2/2 Afib rvr / Acute CHF, s/p lasix, nebs. He also found to have large hematoma within expanded left psoas muscle extending into the left iliacus muscle. He had fevers and hence ID eval requested. He is currently on IV Zosyn. Pt is unable to offer any specific c/o. He has been constipated as per family, no  symptoms, but had cough and SOB and felt warm   etiology of fever likely multifactorial possible due to atelectasis, constipation and large Left RP hematoma  he is also at risk for nosocomial PNA    SUGGEST:  dc Zosyn --completed empiric 5 days course  f/u cultures   aspiration precautions  Pulm consult appreciated, f/u official CT chest to r/o PNA, although pt currently on RA, but at risk for aspiration, no dense infiltrates on my review  d/w pt's family at bedside

## 2023-01-07 NOTE — PROGRESS NOTE ADULT - PROBLEM SELECTOR PLAN 2
Febrile to 101.5 1/2, still with intermittent low grade temps  -Leukocytosis 1/1, now normalized   -BC with NGTD   -RVP/COVID negative  -CT chest with no clear PNA, f/u official read   -ABX per ID, favor complete 7 day course   -Trend leukocytosis, fever curve. Afebrile overnight. Febrile to 101.5 1/2, still with intermittent low grade temps  -Leukocytosis 1/1, now normalized   -BC with NGTD   -RVP/COVID negative  -CT chest with no clear PNA, f/u official read   -ABX per ID, can likely monitor off  -Trend leukocytosis, fever curve. Afebrile overnight.

## 2023-01-07 NOTE — PROGRESS NOTE ADULT - SUBJECTIVE AND OBJECTIVE BOX
SUBJECTIVE:   Patient was seen and evaluated at bedside. Patient is resting in bed and is in no new acute distress.   OVERNIGHT EVENTS:   none   Vital Signs Last 24 Hrs  T(C): 37.2 (07 Jan 2023 08:53), Max: 37.4 (06 Jan 2023 18:33)  T(F): 98.9 (07 Jan 2023 08:53), Max: 99.3 (06 Jan 2023 18:33)  HR: 69 (07 Jan 2023 08:53) (69 - 101)  BP: 106/65 (07 Jan 2023 08:53) (106/65 - 124/70)  BP(mean): --  RR: 18 (07 Jan 2023 08:53) (18 - 22)  SpO2: 98% (07 Jan 2023 08:53) (93% - 98%)    Parameters below as of 07 Jan 2023 08:53  Patient On (Oxygen Delivery Method): room air        PHYSICAL EXAM:    General: No Acute Distress     Neurological: awake alert oriented to person , place and tear with choices, hypophonic, follows commands uppers are 4/5, lowers antigravity, effort dependant, sensation wnl .     Pulmonary: secreations have improved.   Cardiovascular: S1, S2, Regular Rate and Rhythm     Gastrointestinal: Soft, Nontender, Nondistended     Incision:   clean and dry   LABS:                        9.7    6.64  )-----------( 196      ( 06 Jan 2023 06:14 )             30.3    01-06    141  |  109<H>  |  32<H>  ----------------------------<  144<H>  4.0   |  23  |  0.83    Ca    8.3<L>      06 Jan 2023 06:14          01-06 @ 07:01  -  01-07 @ 07:00  --------------------------------------------------------  IN: 710 mL / OUT: 1300 mL / NET: -590 mL      DRAINS:     MEDICATIONS:  Antibiotics:    Neuro:  acetaminophen     Tablet .. 650 milliGRAM(s) Oral every 6 hours PRN Temp greater or equal to 38C (100.4F), Mild Pain (1 - 3)  donepezil 10 milliGRAM(s) Oral at bedtime  memantine 5 milliGRAM(s) Oral two times a day  pregabalin 100 milliGRAM(s) Oral every 8 hours  traMADol 25 milliGRAM(s) Oral every 4 hours PRN Moderate Pain (4 - 6)    Cardiac:  metoprolol tartrate 25 milliGRAM(s) Oral every 8 hours    Pulm:  acetylcysteine 20%  Inhalation 3 milliLiter(s) Inhalation every 6 hours  albuterol/ipratropium for Nebulization 3 milliLiter(s) Nebulizer every 6 hours  buDESOnide    Inhalation Suspension 0.25 milliGRAM(s) Inhalation every 12 hours  sodium chloride 3%  Inhalation 4 milliLiter(s) Inhalation every 12 hours    GI/:  bisacodyl 5 milliGRAM(s) Oral every 12 hours  polyethylene glycol 3350 17 Gram(s) Oral every 12 hours  senna 2 Tablet(s) Oral at bedtime  tamsulosin 0.4 milliGRAM(s) Oral at bedtime    Other:   dextrose 5%. 1000 milliLiter(s) IV Continuous <Continuous>  dextrose 5%. 1000 milliLiter(s) IV Continuous <Continuous>  dextrose 50% Injectable 25 Gram(s) IV Push once  dextrose 50% Injectable 12.5 Gram(s) IV Push once  dextrose 50% Injectable 25 Gram(s) IV Push once  dextrose Oral Gel 15 Gram(s) Oral once PRN Blood Glucose LESS THAN 70 milliGRAM(s)/deciliter  enoxaparin Injectable 40 milliGRAM(s) SubCutaneous <User Schedule>  folic acid 1 milliGRAM(s) Oral daily  glucagon  Injectable 1 milliGRAM(s) IntraMuscular once  insulin lispro (ADMELOG) corrective regimen sliding scale   SubCutaneous three times a day before meals  insulin lispro (ADMELOG) corrective regimen sliding scale   SubCutaneous at bedtime    DIET: [] Regular [] CCD [] Renal [] Puree [] Dysphagia [] Tube Feeds:   pureed diet   IMAGING:   ******PRELIMINARY REPORT******      ******PRELIMINARY REPORT******       ACC: [id number] EXAM:  CT CHEST                          PROCEDURE DATE:  01/06/2023    ******PRELIMINARY REPORT******      ******PRELIMINARY REPORT******           INTERPRETATION:  Small bilateral pleural effusions.        ******PRELIMINARY REPORT******      ******PRELIMINARY REPORT******        DAWIT RIBERA MD; Resident Radiology  This document is a PRELIMINARY interpretation and is pending final   attending approval. Jan 62023  7:11PM  ACC: [id number] EXAM:  CT PELVIS ONLY                        ACC: [id number] EXAM:  CT CERVICAL SPINE                        ACC: [id number] EXAM:  CT LUMBAR SPINE                        ACC: [id number] EXAM:  CT THORACIC SPINE                          PROCEDURE DATE:  12/28/2022          INTERPRETATION:  CT cervical spine without IV contrast    CLINICAL INFORMATION: Fracture, trauma, neck pain.  Neck pain, spinal   stenosis, spondylosis. fell    TECHNIQUE:  Contiguous axial 2.0 mm sections were obtained through the   cervical spine using a single helical acquisition.  Additional 2 mm   sagittal and coronal reformatted reconstructions of the spine were   obtained.  These additional reformatted images were used to evaluate the   spine for alignment,vertebral fractures and the integrity of the the   posterior elements.   This scan was performed using automatic exposure   control (radiation dose reduction software) to obtain a diagnostic image   quality scan with patient dose as low as reasonablyachievable.    FINDINGS:   No prior similar studies are available for review    Cervical vertebral body heights are maintained. No vertebral fracture is   seen. No destructive bone lesion is found.  Alignment is significant for   straightening. DISH is noted with anterior osteophytes.  Facet joints   appear intact and aligned.    Cervical intervertebral disc spaces show advanced degenerative disc   disease and spondylosis with loss of disc height and associated   degenerative endplate changes. There is narrowing of the LEFT C2-3,   BILATERAL C3-4, C4-5, LEFT C5-6 and RIGHT C6-7 neural foramina due to   uncovertebral spurring and facet osteophytic hypertrophy. Laminectomies   are noted at C3-C5.    The skull base appears intact.  No neck mass is recognized.  Paraspinal   soft tissues appear intact. Visualized lymph nodes appear to be within   physiologic size limits.      CT thoracic and lumbar spine without IV contrast    CLINICAL INFORMATION:  Back pain, spinal stenosis, spondylosis.    TECHNIQUE:  Contiguous axial 2 mm sections were obtained through the   thoracic spine using a single helical acquisition.   Additional 2 mm   sagittal and coronal reconstructions of the spine were obtained. These   additional reformatted images were used to evaluate the spine for   alignment, vertebral fractures and the integrity of the the posterior   elements.   This scan was performed using automatic exposure control   (radiation dose reduction software) to obtain a diagnostic image quality   scan with patient dose as low as reasonably achievable.    FINDINGS:   No prior similar studies are available for review    Thoracic and lumbar vertebral body heights are maintained. There is a   chalk-stick fracture through the L2 vertebral body anteriorly extending   into the inferior endplate posteriorly with mild distraction of   fragments. No destructive bone lesion is found.  Alignment is significant   for a mild S-shaped scoliosis.  DISH is present with calcification of   anterior longitudinal ligament. Facet joints appear intact and aligned.    Thoracic intervertebral disc spaces show multilevel degenerative disc   disease and spondylosis with loss of disc height and associated   degenerative endplate changes. Posterior osteophytic ridge/disc complexes   at L1-2 through L5-S1 flatten the ventral thecal sac and narrow the   BILATERAL neural foramina and contributes to mild central stenosis at   these levels in conjunction with facet osteophytic hypertrophy.    Large hematoma is seen in the LEFT psoas muscle with retropulsed   peritoneal hemorrhage on the LEFT.    CT Pelvis without IV contrast    CLINICAL INFORMATION: Pelvic fracture.    TECHNIQUE: Contiguous axial 3 mm images were obtained from a single   helical acquisition through the pelvis without IV contrast..  In   addition, 2D 3 mm sagittal and coronal reformatted images were obtained.    No Oral contrast was also administered.  This scan was performed using   automatic exposure control (radiation dose reduction software) to obtain   a diagnostic image quality scan with patient dose as low as reasonably   achievable.    FINDINGS:   No previous examinations are available for review.    Large hematoma is seen within expanded LEFT psoas muscle extending into   the LEFT iliac is muscle. Stranding in the LEFT retroperitoneum   consistent with mild retroperitoneal hemorrhage.    The bladder appear unremarkable.    No enlarged lymph nodes are found.  No fracture is seen involving the   bony pelvis or proximal femurs..  The osseous structures are intact.    The visualized bowel appears closed unremarkable.        IMPRESSION:    CT cervical spine: No vertebral fracture is recognized.  Advanced   degenerative disc disease and spondylosis with loss of disc height and  associated degenerative endplate changes. There is narrowing of the LEFT   C2-3, BILATERAL C3-4, C4-5, LEFT C5-6 and RIGHT C6-7 neural foramina due   to uncovertebral spurring and facet osteophytic hypertrophy.   Laminectomies are noted at C3-C5.    CT thoracic/lumbar:  Chalk-stick fracture through the L2 vertebral body   anteriorly extending into the inferior endplate posteriorly with mild   distraction of fragments.   DISH is present with calcification of   anterior longitudinal ligament.  Multilevel degenerative disc disease and   spondylosis. Posterior osteophytic ridge/disc complexes at L1-2 through   L5-S1 flatten the ventral thecal sac and narrow the BILATERAL neural   foramina and contributes to mild central stenosis at these levelsin   conjunction with facet osteophytic hypertrophy.    CT pelvis: No fracture.   Large hematoma is seen within expanded LEFT   psoas muscle extending into the LEFT iliac is muscle. Stranding in the   LEFT retroperitoneum consistent with mild retroperitoneal hemorrhage.        --- End of Report ---            LATISHA NEW MD; Attending Radiologist  This document has been electronically signed. Dec 28 2022  4:41PM

## 2023-01-07 NOTE — PROGRESS NOTE ADULT - ASSESSMENT
HPI:  88yo M h/o dementia, Afib on Xarelto, presenting as transfer from [facility] after mechanical fall. Pt had a ground level fall yesterday, and was transferred here for higher level of care.  Got [employer] at [facility]        PROCEDURE: Fusion, spine, thoracolumbar, posterior approach  s/p L1-L4 perc screw insertion for L2 fracture after a fall on 12/31      POD#  7  PLAN:  Neuro:   1 Out of bed   2 continue pt/ot   3 prn pain medication   4 continue aricept and namenda for dementia   5 family prefers to take patient home.    6 hemovac was removed on 1/6     Respiratory:   1 secretions - will continue on duoneb, mucomyst and pulmicort -frequent suctioning =-improved today   2 pulmonary saw -chest ct  : done no signs of pna -will consider stopping abx   3 1/6 cxr stable   4 on room air now   5 on zosyn for presumed pna     CV:  1 blood pressure stable     Endocrine:   1 dm- continue on sliding scale     Heme/Onc:             DVT ppx: sql and scds   1 anemia- chronic in nature     Renal:   1 retention - prn straight cath   2 continue flomax   3 iv lock  ID:   1 afebrile   2 wbc normal   3 id following - on zosyn for presumed pna -will consider stopping today     GI:   1 on a pureed diet per swallow therapist.   2 bowel regimen   3 last bm 1/7   Social/Family:   Discharge planning: dc home with family once stable     -will discuss with Dr. Hector   -[facility] [id number]       [facility]   patient will need transfer wheelchair due to patient's deconditioning and generalized weakness secondary to spinal fracture and fixation of lumbar spine. Patient will require a transport wheelchair. Patient is unable to self propel in a standard wheel chair . This is necessary to achieve daily tasks and therapies which cannot be achieved with the use of a cane or rolling walker. Patient and family are in agreement with transport chair use at home and assistance will be provided if needed.

## 2023-01-07 NOTE — PROGRESS NOTE ADULT - ASSESSMENT
86yo M h/o dementia, Afib on Xarelto, BPH, HTN, HLD, DM2 presenting as transfer from [facility] after mechanical fall. Pt found to have L2 fracture, and small associated L psoas hematoma.    # Compression fracture of L2  s/p L1-L4 percutaneous fusion  pain control, bowel regimen  PT OOB   plan per primary team    # fever  # Pulm edema 2/2 Afib rvr / Acute hfpef   CT chest prelim pulm effusion small, no pna  cont lopressor 25mg q8  tte normal lvef no wma; mild valvular dz  C/w nebs and Pulmicort   completed 5 days zosyn, ID following  pulm following    # Hematoma left psoas muscle extending into the left iliac muscle  h/h stable  holding full AC, on dvt ppx    # Dementia   C/w aricept 10mg and namenda 5mg bid  aspiration precautions    # Hypertension  C/w metoprolol    # Chronic atrial fibrillation  cont metoprolol   resume full dose AC once appropriate    # Diabetes mellitus  A1C 6.8   C/w SS  Takes metformin 500mg at home, resume on DC, outpt fu    # Hyperlipemia  Simvastatin 10mg    PCP Dr. Shasta Victor    Please call [employer] with questions [phone].

## 2023-01-07 NOTE — PROGRESS NOTE ADULT - ASSESSMENT
88 y/o M with PMH of dementia, afib on Xarelto. Presents as a tx from [facility] s/p fall, found to have L2 spine fracture, RP hematoma. Now s/p L1-L4 lumbar fusion. Hospital course c/b postop hypotension requiring pressors, afib with RVR, pulm edema, dysphagia. Also with new fevers to 101.5 F on 1/2. CXR with mild congestion. Pulmonary called to consult for management of secretions, pt with wet cough.

## 2023-01-08 LAB
ANION GAP SERPL CALC-SCNC: 9 MMOL/L — SIGNIFICANT CHANGE UP (ref 5–17)
BUN SERPL-MCNC: 28 MG/DL — HIGH (ref 7–23)
CALCIUM SERPL-MCNC: 9 MG/DL — SIGNIFICANT CHANGE UP (ref 8.4–10.5)
CHLORIDE SERPL-SCNC: 108 MMOL/L — SIGNIFICANT CHANGE UP (ref 96–108)
CO2 SERPL-SCNC: 23 MMOL/L — SIGNIFICANT CHANGE UP (ref 22–31)
CREAT SERPL-MCNC: 0.74 MG/DL — SIGNIFICANT CHANGE UP (ref 0.5–1.3)
CULTURE RESULTS: SIGNIFICANT CHANGE UP
CULTURE RESULTS: SIGNIFICANT CHANGE UP
EGFR: 88 ML/MIN/1.73M2 — SIGNIFICANT CHANGE UP
GLUCOSE BLDC GLUCOMTR-MCNC: 135 MG/DL — HIGH (ref 70–99)
GLUCOSE BLDC GLUCOMTR-MCNC: 140 MG/DL — HIGH (ref 70–99)
GLUCOSE BLDC GLUCOMTR-MCNC: 156 MG/DL — HIGH (ref 70–99)
GLUCOSE BLDC GLUCOMTR-MCNC: 218 MG/DL — HIGH (ref 70–99)
GLUCOSE SERPL-MCNC: 134 MG/DL — HIGH (ref 70–99)
HCT VFR BLD CALC: 32.5 % — LOW (ref 39–50)
HGB BLD-MCNC: 9.9 G/DL — LOW (ref 13–17)
MCHC RBC-ENTMCNC: 30 PG — SIGNIFICANT CHANGE UP (ref 27–34)
MCHC RBC-ENTMCNC: 30.5 GM/DL — LOW (ref 32–36)
MCV RBC AUTO: 98.5 FL — SIGNIFICANT CHANGE UP (ref 80–100)
NRBC # BLD: 0 /100 WBCS — SIGNIFICANT CHANGE UP (ref 0–0)
PLATELET # BLD AUTO: 226 K/UL — SIGNIFICANT CHANGE UP (ref 150–400)
POTASSIUM SERPL-MCNC: 3.8 MMOL/L — SIGNIFICANT CHANGE UP (ref 3.5–5.3)
POTASSIUM SERPL-SCNC: 3.8 MMOL/L — SIGNIFICANT CHANGE UP (ref 3.5–5.3)
RBC # BLD: 3.3 M/UL — LOW (ref 4.2–5.8)
RBC # FLD: 14.7 % — HIGH (ref 10.3–14.5)
SODIUM SERPL-SCNC: 140 MMOL/L — SIGNIFICANT CHANGE UP (ref 135–145)
SPECIMEN SOURCE: SIGNIFICANT CHANGE UP
SPECIMEN SOURCE: SIGNIFICANT CHANGE UP
WBC # BLD: 8.13 K/UL — SIGNIFICANT CHANGE UP (ref 3.8–10.5)
WBC # FLD AUTO: 8.13 K/UL — SIGNIFICANT CHANGE UP (ref 3.8–10.5)

## 2023-01-08 PROCEDURE — 71045 X-RAY EXAM CHEST 1 VIEW: CPT | Mod: 26

## 2023-01-08 RX ADMIN — Medication 3 MILLILITER(S): at 11:36

## 2023-01-08 RX ADMIN — Medication 100 MILLIGRAM(S): at 13:03

## 2023-01-08 RX ADMIN — Medication 25 MILLIGRAM(S): at 05:25

## 2023-01-08 RX ADMIN — Medication 650 MILLIGRAM(S): at 09:57

## 2023-01-08 RX ADMIN — Medication 25 MILLIGRAM(S): at 22:09

## 2023-01-08 RX ADMIN — MEMANTINE HYDROCHLORIDE 5 MILLIGRAM(S): 10 TABLET ORAL at 05:24

## 2023-01-08 RX ADMIN — Medication 1 MILLIGRAM(S): at 11:35

## 2023-01-08 RX ADMIN — Medication 3 MILLILITER(S): at 05:23

## 2023-01-08 RX ADMIN — Medication 3 MILLILITER(S): at 16:50

## 2023-01-08 RX ADMIN — TAMSULOSIN HYDROCHLORIDE 0.4 MILLIGRAM(S): 0.4 CAPSULE ORAL at 22:09

## 2023-01-08 RX ADMIN — Medication 100 MILLIGRAM(S): at 22:09

## 2023-01-08 RX ADMIN — Medication 0.25 MILLIGRAM(S): at 16:51

## 2023-01-08 RX ADMIN — Medication 650 MILLIGRAM(S): at 09:29

## 2023-01-08 RX ADMIN — SODIUM CHLORIDE 4 MILLILITER(S): 9 INJECTION INTRAMUSCULAR; INTRAVENOUS; SUBCUTANEOUS at 16:51

## 2023-01-08 RX ADMIN — Medication 3 MILLILITER(S): at 23:50

## 2023-01-08 RX ADMIN — Medication 2: at 07:39

## 2023-01-08 RX ADMIN — Medication 0.25 MILLIGRAM(S): at 05:23

## 2023-01-08 RX ADMIN — DONEPEZIL HYDROCHLORIDE 10 MILLIGRAM(S): 10 TABLET, FILM COATED ORAL at 22:09

## 2023-01-08 RX ADMIN — Medication 4: at 11:33

## 2023-01-08 RX ADMIN — SODIUM CHLORIDE 4 MILLILITER(S): 9 INJECTION INTRAMUSCULAR; INTRAVENOUS; SUBCUTANEOUS at 05:23

## 2023-01-08 RX ADMIN — Medication 3 MILLILITER(S): at 23:29

## 2023-01-08 RX ADMIN — Medication 25 MILLIGRAM(S): at 13:03

## 2023-01-08 RX ADMIN — ENOXAPARIN SODIUM 40 MILLIGRAM(S): 100 INJECTION SUBCUTANEOUS at 16:52

## 2023-01-08 RX ADMIN — Medication 3 MILLILITER(S): at 05:24

## 2023-01-08 NOTE — PROGRESS NOTE ADULT - SUBJECTIVE AND OBJECTIVE BOX
SUBJECTIVE: Comfortable w/occass wet cough. Daughter Barby at bedside. NAD    OVERNIGHT EVENTS: None    Vital Signs Last 24 Hrs  T(C): 36.6 (08 Jan 2023 12:12), Max: 37.2 (08 Jan 2023 01:00)  T(F): 97.9 (08 Jan 2023 12:12), Max: 98.9 (08 Jan 2023 01:00)  HR: 83 (08 Jan 2023 12:12) (71 - 94)  BP: 104/65 (08 Jan 2023 12:12) (104/65 - 124/74)  BP(mean): --  RR: 18 (08 Jan 2023 12:12) (18 - 18)  SpO2: 98% (08 Jan 2023 12:12) (94% - 98%)    Parameters below as of 08 Jan 2023 12:12  Patient On (Oxygen Delivery Method): room air    IVF: [X ] IVL [ ] NS+K@   DIET: [ ] Regular [ X] CCD w/MTL [ ] Renal [ ] Puree [ ] Dysphagia [ ] Tube Feeds:   PCA: [ ] YES [X ] NO   WALLACE: [ ] YES [ X] NO [X ] VOID/Incontinence via Condom Cath + St Cath, [place] Ccdm=330eo last PM  BM: [ X] YES-on 1/4, 1/5     DRAINS: [ ] MARCELINO (cc/24h) [X ] HMV 60cc (cc/24h)    PHYSICAL EXAM:    Constitutional: No Acute Distress     Neurological: Kyrgyz speaking-mostly. AAOx2 (dementia), +Follow commands. CROSS 4+/5    Sensation: [X] intact to light touch  [] decreased:     Pulmonary: Clear to Auscultation, No rales, No rhonchi, No wheezes     Cardiovascular: S1, S2, Regular rate and rhythm     Gastrointestinal: Soft, Non-tender, Non-distended     Extremities: No calf tenderness     Incision: HMV active. +staples. CDI/Flat    LABS:                        9.3    6.38  )-----------( 161      ( 03 Jan 2023 04:54 )             29.4    01-03    140  |  105  |  47<H>  ----------------------------<  170<H>  3.9   |  24  |  0.93    Ca    8.5      03 Jan 2023 04:55  Phos  3.0     01-03  Mg     2.2     01-03    Culture - Blood (01.02.23 @ 21:50)    Specimen Source: .Blood Blood-Peripheral    Culture Results:   No growth to date.    SARS-CoV-2: NotDetec (02 Jan 2023 22:28)  COVID-19 PCR: NotDete (29 Dec 2022 16:29)    IMAGING:  < from: Xray Abdomen 1 View PORTABLE -Urgent (Xray Abdomen 1 View PORTABLE -Urgent .) (01.02.23 @ 20:59) >  Gaseous distention of the stomach. Otherwise, nonobstructive bowel gas   pattern.    < end of copied text >  < from: Xray Chest 1 View- PORTABLE-Urgent (Xray Chest 1 View- PORTABLE-Urgent .) (01.02.23 @ 20:59) >  Clear lungs.    < end of copied text >    < from: CT Head No Cont (01.01.23 @ 11:02) >  No acute intracranial hemorrhage, hydrocephalus or extra-axial fluid   collection.  Mild parenchymal volume loss and mild chronic microvascular ischemic   changes.  No CT evidence for acute transcortical infarction. Please note that MR   imaging is a more sensitive imaging modality for detection of acute   infarction and may be obtained as clinically warranted.    < end of copied text >    < from: MR Lumbar Spine No Cont (12.29.22 @ 23:34) >    Markedly limited examination due to patient motion. Oblique fracture   through the inferior body of L2 extending through the anterior cortex and   inferior endplate with mild distraction. Associated edema is   noted.Enlargement of the LEFT psoas muscle consistent with known   intramuscular hematoma. Degenerative disc disease and spondylosis as   described.    < end of copied text >    < from: CT Pelvis No Cont (12.28.22 @ 15:23) >  CT cervical spine: No vertebral fracture is recognized.  Advanced   degenerative disc disease and spondylosis with loss of disc height and  associated degenerative endplate changes. There is narrowing of the LEFT   C2-3, BILATERAL C3-4, C4-5, LEFT C5-6 and RIGHT C6-7 neural foramina due   to uncovertebral spurring and facet osteophytic hypertrophy.   Laminectomies are noted at C3-C5.    CT thoracic/lumbar:  Chalk-stick fracture through the L2 vertebral body   anteriorly extending into the inferior endplate posteriorly with mild   distraction of fragments.   DISH is present with calcification of   anterior longitudinal ligament.  Multilevel degenerative disc disease and   spondylosis. Posterior osteophytic ridge/disc complexes at L1-2 through   L5-S1 flatten the ventral thecal sac and narrow the BILATERAL neural   foramina and contributes to mild central stenosis at these levelsin   conjunction with facet osteophytic hypertrophy.    CT pelvis: No fracture.   Large hematoma is seen within expanded LEFT   psoas muscle extending into the LEFT iliac is muscle. Stranding in the   LEFT retroperitoneum consistent with mild retroperitoneal hemorrhage.    < end of copied text >    MEDICATIONS  (STANDING):  albuterol/ipratropium for Nebulization 3 milliLiter(s) Nebulizer every 4 hours  bisacodyl 5 milliGRAM(s) Oral every 12 hours  buDESOnide    Inhalation Suspension 0.25 milliGRAM(s) Inhalation every 12 hours  enoxaparin Injectable 40 milliGRAM(s) SubCutaneous <User Schedule>  folic acid 1 milliGRAM(s) Oral daily  insulin lispro (ADMELOG) corrective regimen sliding scale   SubCutaneous Before meals and at bedtime  metoprolol tartrate 25 milliGRAM(s) Oral every 8 hours  piperacillin/tazobactam IVPB.. 3.375 Gram(s) IV Intermittent every 8 hours  polyethylene glycol 3350 17 Gram(s) Oral every 12 hours  pregabalin 100 milliGRAM(s) Oral every 8 hours  senna 2 Tablet(s) Oral at bedtime  sodium chloride 3%  Inhalation 4 milliLiter(s) Inhalation every 12 hours  tamsulosin 0.4 milliGRAM(s) Oral at bedtime    MEDICATIONS  (PRN):  acetaminophen     Tablet .. 650 milliGRAM(s) Oral every 6 hours PRN Temp greater or equal to 38C (100.4F), Mild Pain (1 - 3)  traMADol 25 milliGRAM(s) Oral every 4 hours PRN Moderate Pain (4 - 6)   SUBJECTIVE: Comfortable w/occass wet cough. Daughter Barby at bedside. NAD    OVERNIGHT EVENTS: None    Vital Signs Last 24 Hrs  T(C): 36.6 (08 Jan 2023 12:12), Max: 37.2 (08 Jan 2023 01:00)  T(F): 97.9 (08 Jan 2023 12:12), Max: 98.9 (08 Jan 2023 01:00)  HR: 83 (08 Jan 2023 12:12) (71 - 94)  BP: 104/65 (08 Jan 2023 12:12) (104/65 - 124/74)  BP(mean): --  RR: 18 (08 Jan 2023 12:12) (18 - 18)  SpO2: 98% (08 Jan 2023 12:12) (94% - 98%)    Parameters below as of 08 Jan 2023 12:12  Patient On (Oxygen Delivery Method): room air    IVF: [X ] IVL [ ] NS+K@   DIET: [ ] Regular [ X] CCD w/MTL [ ] Renal [ ] Puree [ ] Dysphagia [ ] Tube Feeds:   PCA: [ ] YES [X ] NO   WALLACE: [ ] YES [ X] NO [X ] VOID/Incontinence via Condom Cath, intermitt cath  BM: [ X] YES-on 1/4, 1/5, 1/7    DRAINS: None    PHYSICAL EXAM:    Constitutional: No Acute Distress     Neurological: Bulgarian speaking-mostly. Much improved.  AAOx2 (dementia), +Follow commands. CROSS 4+/5    Sensation: [X] intact to light touch  [] decreased:     Pulmonary: Clear to Auscultation, No rales, No rhonchi, No wheezes     Cardiovascular: S1, S2, Regular rate and rhythm     Gastrointestinal: Soft, Non-tender, Non-distended     Extremities: No calf tenderness     Incision:  +staples. CDI/Flat    LABS:                                   9.9    8.13  )-----------( 226      ( 08 Jan 2023 06:14 )             32.5   01-08    140  |  108  |  28<H>  ----------------------------<  134<H>  3.8   |  23  |  0.74    Ca    9.0      08 Jan 2023 06:13    Culture - Blood (01.02.23 @ 21:50)    Specimen Source: .Blood Blood-Peripheral    Culture Results:   No growth to date.    Respiratory Viral Panel with COVID-19 by DELFINA (01.02.23 @ 22:28)    Rapid RVP Result: NotDetec    SARS-CoV-2: NotDete    IMAGING:  < from: CT Chest No Cont (01.06.23 @ 17:56) >    Small lateral pleural effusions, left greater than right with adjacent   compressive atelectasis.    < end of copied text >      < from: Xray Abdomen 1 View PORTABLE -Urgent (Xray Abdomen 1 View PORTABLE -Urgent .) (01.02.23 @ 20:59) >  Gaseous distention of the stomach. Otherwise, nonobstructive bowel gas   pattern.    < end of copied text >  < from: Xray Chest 1 View- PORTABLE-Urgent (Xray Chest 1 View- PORTABLE-Urgent .) (01.02.23 @ 20:59) >  Clear lungs.    < end of copied text >    < from: CT Head No Cont (01.01.23 @ 11:02) >  No acute intracranial hemorrhage, hydrocephalus or extra-axial fluid   collection.  Mild parenchymal volume loss and mild chronic microvascular ischemic   changes.  No CT evidence for acute transcortical infarction. Please note that MR   imaging is a more sensitive imaging modality for detection of acute   infarction and may be obtained as clinically warranted.    < end of copied text >    < from: MR Lumbar Spine No Cont (12.29.22 @ 23:34) >    Markedly limited examination due to patient motion. Oblique fracture   through the inferior body of L2 extending through the anterior cortex and   inferior endplate with mild distraction. Associated edema is   noted.Enlargement of the LEFT psoas muscle consistent with known   intramuscular hematoma. Degenerative disc disease and spondylosis as   described.    < end of copied text >    < from: CT Pelvis No Cont (12.28.22 @ 15:23) >  CT cervical spine: No vertebral fracture is recognized.  Advanced   degenerative disc disease and spondylosis with loss of disc height and  associated degenerative endplate changes. There is narrowing of the LEFT   C2-3, BILATERAL C3-4, C4-5, LEFT C5-6 and RIGHT C6-7 neural foramina due   to uncovertebral spurring and facet osteophytic hypertrophy.   Laminectomies are noted at C3-C5.    CT thoracic/lumbar:  Chalk-stick fracture through the L2 vertebral body   anteriorly extending into the inferior endplate posteriorly with mild   distraction of fragments.   DISH is present with calcification of   anterior longitudinal ligament.  Multilevel degenerative disc disease and   spondylosis. Posterior osteophytic ridge/disc complexes at L1-2 through   L5-S1 flatten the ventral thecal sac and narrow the BILATERAL neural   foramina and contributes to mild central stenosis at these levelsin   conjunction with facet osteophytic hypertrophy.    CT pelvis: No fracture.   Large hematoma is seen within expanded LEFT   psoas muscle extending into the LEFT iliac is muscle. Stranding in the   LEFT retroperitoneum consistent with mild retroperitoneal hemorrhage.    < end of copied text >    MEDICATIONS  (STANDING):  acetylcysteine 20%  Inhalation 3 milliLiter(s) Inhalation every 6 hours  albuterol/ipratropium for Nebulization 3 milliLiter(s) Nebulizer every 6 hours  bisacodyl 5 milliGRAM(s) Oral every 12 hours  buDESOnide    Inhalation Suspension 0.25 milliGRAM(s) Inhalation every 12 hours  dextrose 5%. 1000 milliLiter(s) (50 mL/Hr) IV Continuous <Continuous>  dextrose 5%. 1000 milliLiter(s) (100 mL/Hr) IV Continuous <Continuous>  dextrose 50% Injectable 25 Gram(s) IV Push once  dextrose 50% Injectable 12.5 Gram(s) IV Push once  dextrose 50% Injectable 25 Gram(s) IV Push once  donepezil 10 milliGRAM(s) Oral at bedtime  enoxaparin Injectable 40 milliGRAM(s) SubCutaneous <User Schedule>  folic acid 1 milliGRAM(s) Oral daily  glucagon  Injectable 1 milliGRAM(s) IntraMuscular once  insulin lispro (ADMELOG) corrective regimen sliding scale   SubCutaneous three times a day before meals  insulin lispro (ADMELOG) corrective regimen sliding scale   SubCutaneous at bedtime  memantine 5 milliGRAM(s) Oral two times a day  metoprolol tartrate 25 milliGRAM(s) Oral every 8 hours  polyethylene glycol 3350 17 Gram(s) Oral every 12 hours  pregabalin 100 milliGRAM(s) Oral every 8 hours  senna 2 Tablet(s) Oral at bedtime  sodium chloride 3%  Inhalation 4 milliLiter(s) Inhalation every 12 hours  tamsulosin 0.4 milliGRAM(s) Oral at bedtime    MEDICATIONS  (PRN):  acetaminophen     Tablet .. 650 milliGRAM(s) Oral every 6 hours PRN Temp greater or equal to 38C (100.4F), Mild Pain (1 - 3)  dextrose Oral Gel 15 Gram(s) Oral once PRN Blood Glucose LESS THAN 70 milliGRAM(s)/deciliter  traMADol 25 milliGRAM(s) Oral every 4 hours PRN Moderate Pain (4 - 6)

## 2023-01-08 NOTE — PROGRESS NOTE ADULT - ASSESSMENT
88y male with hx dementia, afib, BPH, HTN, HLD, and DM2 who presented as a transfer from [facility] after mechanical fall. He was found to have L2 fracture and associated L psoas hematoma. Admitted to [facility] 12/28, now s/p L1-L4 percutaneous fusion. Post op pulm edema 2/2 Afib rvr / Acute CHF, s/p lasix, nebs. He also found to have large hematoma within expanded left psoas muscle extending into the left iliacus muscle. He had fevers and hence ID eval requested. He is currently on IV Zosyn. Pt is unable to offer any specific c/o. He has been constipated as per family, no  symptoms, but had cough and SOB and felt warm   etiology of fever likely multifactorial possible due to atelectasis, constipation and large Left RP hematoma  he is also at risk for nosocomial PNA    SUGGEST:  Pt now completed empiric 5 days course of Zosyn--dcdc  f/u cultures   CT chest reviewed--no evidence of PNA  aspiration precautions  Pulm consult appreciated  d/w pt's family at bedside   Dc planning

## 2023-01-08 NOTE — PROGRESS NOTE ADULT - ASSESSMENT
Patient is a 87y old  Male who presents with a chief complaint of mechanical fall    86yo M h/o dementia, Afib on Xarelto, presenting as transfer from [facility] after mechanical fall. Pt had a ground level fall yesterday, and was transferred here for higher level of care.  Got Kcentra at [facility]    PROCEDURE: Adm 12/28. 12/31 L1-4 Perc Screw-thoracolumbar, posterior approach     POD#4    PLAN:  Neuro: Cont HMV drain. Condom cath on + St Cath PRN-monitor UO.  Resp sounds wet-PT/PD, cont nebs. Anemia from surgical blood loss-stable. Cont Zosyn per ID-FU ID note-P, if cont to spike, may need to consider dedicated CT chest/abd, repeat Resp PCR.   Hx A. Fib when to resume Xarelto FU.  Inc activity/OOB. PT/OT recomm [facility], but family wants to take pt home pending hosp course-all DME scripts written. DC Home once Medically/ID clear. FU CBC, BMP AM. Family to bring in pt Dementia meds so can resume it FU.    FEEST to be done today to R/O asp and diet upgrade for DC home FU.    ID note of 1/4-  He had fevers and hence ID eval requested. He is currently on IV Zosyn. Pt is unable to offer any specific c/o. He has been constipated as per family, no  symptoms, but had cough and SOB and felt warm. etiology of fever likely multifactorial possible due to atelectasis, constipation and large Left RP hematoma he is also at risk for nosocomial PNA  SUGGEST:cont empiric Zosyn for now f/u cultures aspiration precautions if cont to spike, may need to consider dedicated CT chest/abd, repeat Resp PCR d/w pt's son at bedside     Electronic Signatures:Dell Sen (DO    Medicine note of 1/3-currently hgb stable no need for transfusion, dvt ppx -lovenox,,Pulm edema 2/2 Afib rvr / Acute hfpef -per family pt has chronic wheezing), no history of  copd or asthma;  possibly some underlying reactive airway dz -cxr noted, pro bnp elevated, -no history of smoking, copd or asthma -afib controlled -tte normal lvef no wma; mild valvular dz  -s/p lasix -C/w nebs and Pulmicort per NSICU Hematoma. Found to have large hematoma within expanded left psoas muscle extending into the left iliac is muscle. Trend h/h   AC on hold; when safe to use should be on full dose AC Dementia. -C/w aricept 10mg and namenda 5mg bid.Hypertension. C/w metoprolol;lisinopril on hold Chronic atrial fibrillation.  started on metoprolol  should be on full dose AC if ok with nsgy , currently on lovenox 40 mg daily; when safe to use Diabetes mellitus. - A1C 6.8 C/w SS Takes metformin 500mg at home DM well controlled, PMD follow up for medicaiton optimization, would not discharge on metformin.Hyperlipemia. Simvastatin 10mg. PCP Dr. Shasta [phone]  Home meds: donepezil 10mg, sertraline 25mg, metformin 500mg qd, xaretlo 20mg, memantine 5mg bid, flomax 0.4mg, lisinopril 5mg, simvastatin 10mg.Electronic Signatures:  Mtai Santamaria)     Cardio note of 1/2-Cardiology called for episodes of PVCs, Afib, bigeminy.1. pAfib, PVCs, bigeminy- Increase frequency of Metoprolol Tartrate 25mg to q8h, goal HR < 110  - Can use Metoprolol 5mg IV prn for elevated HRs above 130- Resume anticoagulation once cleared from surgery perspective- Keep K > 4, Mag > 2- Monitor on Telemetry   Attestation Statements: I have personally seen and examined the patient.  I fully participated in the care of this patient.  I have made amendments to the documentation where necessary, and agree with the history, physical exam, and plan as documented by the Fellow. Agree with plan as outlined above.Known AF w rapid rates following procedure  Increase blockade for now Electronic Signatures:Maco Norwood)  (Signed 02-Jan-2023 14:12)Authored: Attestation StatementsCo-Signer: Progress Note, Reason for Admission, Subjective and Objective, Assessment and Plan  Ericka Jordan)      Respiratory: Patient instructed to use incentive spirometer [ X] YES [ ] NO              DVT ppx: [X ] SQL [ ] SQH and Venodynes [ ] Left [ ] Right [ X] Bilateral    Discharge Planning:  The patient was evaluated by PT/OT and recommended S. Acute Rehab. He was subsequently DC on >>>/2023 in stable condition.    More than 30 minutes spent on total encounter: more than 50% of the visit was spent on educating the patient and family regarding condition, medications, follow up plans, signs and symptoms to be concerned with, preparing paperwork, and questions answered regarding discharge.       Patient is a 87y old  Male who presents with a chief complaint of mechanical fall    86yo M h/o dementia, Afib on Xarelto, presenting as transfer from [facility] after mechanical fall. Pt had a ground level fall yesterday, and was transferred here for higher level of care.  Got Kcentra at [facility]    PROCEDURE: Adm 12/28. 12/31 L1-4 Perc Screw-thoracolumbar, posterior approach     POD#8    PLAN:  Neuro: Condom cath on + Intermitt St Cath PRN-monitor UO, Ck PVR/Bladd Scan next void, if retaning may need to teach St Cath VS place wilson for discharge.  Cont IS, PT/PD, cont nebs. Anemia from surgical blood loss-improved. Off Zosyn.  Hx A. Fib when to resume Xarelto FU.  Inc activity/OOB. PT/OT recomm [facility], but family wants to take pt home pending hosp course-all DME scripts written. Poss DC Home 1/9 once Medically/ID clear.    Pulm note of 1/7-Pulmonary called to consult for management of secretions, pt with wet cough.  Problem/Plan - 1:·  Problem: Shortness of breath. ·  Plan: CXR grossly clear-CT chest with small b/l pl effusions, no clear PNA. F/u official report -Initially with mild dyspnea, congested cough with thick secretions via deep oral suctioning -Secretions, dyspnea much improved today. Breathing comfortably on RA, minimal secretions on exam-Continue Duoneb j8y-Zedrylxz Mucomyst 20% 3ml q6h x3 days (give with Duoneb)-Chest PT-Suction PRN-Keep sats >90% with O2 PRN. Problem/Plan - 2:·  Problem: Fever. ·  Plan: Febrile to 101.5 1/2, still with intermittent low grade temps-Leukocytosis 1/1, now normalized -BC with NGTD -RVP/COVID negative-CT chest with no clear PNA, f/u official read -ABX per ID, can likely monitor off-Trend leukocytosis, fever curve. Afebrile overnight. Problem/Plan - 3:·  Problem: Dysphagia. ·  Plan: Pt with oropharyngeal dysphagia-S/p FEES - silent aspiration thin liquids, laryngeal penetration of moderately thick liquids-Diet per speech-Aspiration precautions  -Oral care. Problem/Plan - 4:·  Problem: Fusion of lumbar spine. ·  Plan: S/p fall, found to have L2 spine fracture-Sp L1-L4 lumbar fusion 12/31-Per neurosurgery. Problem/Plan - 5:  ·  Problem: Hematoma. ·  Plan: CT with L psoas muscle hematoma-AC for afib held, on DVT ppx-Monitor H/H. Problem/Plan - 6:·  Problem: Dementia. ·  Plan: Per hx-Supportive care per primary team. Problem/Plan - 7:·  Problem: Afib. ·  Plan: W/ episodes of RVR -AC held 2nd psoas hematoma, on DVT ppx -Cards f/u. Problem/Plan - 8:·  Problem: R/O  Pneumonia.  ·  Plan: Secretions as above, improving today-CT chest with small b/l pl effusions, no clear PNA. F/u official report-ABX per ID, can likely d/c.Attestation Statements:    Attestation StatementsI have made amendments to the documentation where necessary. Additional comments: ct neg for pna pt on ra doing ok suggest keep sat>90% w o2 as needed  observe off abx dw pt and wife.Electronic Signatures:Selene Solitario (NP)  (Signed 07-Jan-2023 12:41)Authored: Progress Note, Reason for Admission, Subjective and Objective, Assessment and PlanFabian Velázquez (MD)    ID note of 1/8-SUGGEST:Pt now completed empiric 5 days course of Zosyn--dc, f/u cultures CT chest reviewed--no evidence of PNA aspiration precautions Pulm consult appreciated  d/w pt's family at bedside Dc planning  Electronic Signatures: Dell Sen (DO)  (    Medicine note of 1/3-currently hgb stable no need for transfusion, dvt ppx -lovenox,,Pulm edema 2/2 Afib rvr / Acute hfpef -per family pt has chronic wheezing), no history of  copd or asthma;  possibly some underlying reactive airway dz -cxr noted, pro bnp elevated, -no history of smoking, copd or asthma -afib controlled -tte normal lvef no wma; mild valvular dz  -s/p lasix -C/w nebs and Pulmicort per NSICU Hematoma. Found to have large hematoma within expanded left psoas muscle extending into the left iliac is muscle. Trend h/h   AC on hold; when safe to use should be on full dose AC Dementia. -C/w aricept 10mg and namenda 5mg bid.Hypertension. C/w metoprolol;lisinopril on hold Chronic atrial fibrillation.  started on metoprolol  should be on full dose AC if ok with nsgy , currently on lovenox 40 mg daily; when safe to use Diabetes mellitus. - A1C 6.8 C/w SS Takes metformin 500mg at home DM well controlled, PMD follow up for medicaiton optimization, would not discharge on metformin.Hyperlipemia. Simvastatin 10mg. PCP Dr. Shasta [phone]  Home meds: donepezil 10mg, sertraline 25mg, metformin 500mg qd, xaretlo 20mg, memantine 5mg bid, flomax 0.4mg, lisinopril 5mg, simvastatin 10mg.Electronic Signatures:  Mati Santamaria)     Cardio note of 1/2-Cardiology called for episodes of PVCs, Afib, bigeminy.1. pAfib, PVCs, bigeminy- Increase frequency of Metoprolol Tartrate 25mg to q8h, goal HR < 110  - Can use Metoprolol 5mg IV prn for elevated HRs above 130- Resume anticoagulation once cleared from surgery perspective- Keep K > 4, Mag > 2- Monitor on Telemetry   Attestation Statements: I have personally seen and examined the patient.  I fully participated in the care of this patient.  I have made amendments to the documentation where necessary, and agree with the history, physical exam, and plan as documented by the Fellow. Agree with plan as outlined above.Known AF w rapid rates following procedure  Increase blockade for now Electronic Signatures:Maco Norwood)  (Signed 02-Jan-2023 14:12)Authored: Attestation StatementsCo-Signer: Progress Note, Reason for Admission, Subjective and Objective, Assessment and Plan  Ericka Jordan (MD)      Respiratory: Patient instructed to use incentive spirometer [ X] YES [ ] NO              DVT ppx: [X ] SQL [ ] SQH and Venodynes [ ] Left [ ] Right [ X] Bilateral    Discharge Planning:  The patient was evaluated by PT/OT and recommended S. Acute Rehab, but family wants pt to go home w/DME. He was subsequently DC on >>>/2023 in stable condition.    More than 30 minutes spent on total encounter: more than 50% of the visit was spent on educating the patient and family regarding condition, medications, follow up plans, signs and symptoms to be concerned with, preparing paperwork, and questions answered regarding discharge.

## 2023-01-08 NOTE — PROGRESS NOTE ADULT - SUBJECTIVE AND OBJECTIVE BOX
CC: f/u for fever post-op    Patient resting comfortably in bed, no new c/o    REVIEW OF SYSTEMS: no fevers, no chills, no nausea, no vomiting, no abd pain, no resp symptoms  All other review of systems negative (Comprehensive ROS)    Antimicrobials Day #          Other Medications Reviewed    Vital Signs Last 24 Hrs  T(C): 37.2 (2023 04:51), Max: 37.2 (2023 08:53)  T(F): 98.9 (2023 04:51), Max: 98.9 (2023 08:53)  HR: 86 (2023 04:51) (69 - 94)  BP: 119/89 (2023 04:51) (106/65 - 124/74)  BP(mean): --  RR: 18 (2023 04:51) (18 - 18)  SpO2: 97% (2023 04:51) (94% - 98%)    Parameters below as of 2023 04:51  Patient On (Oxygen Delivery Method): room air                PHYSICAL EXAM:  General: alert, no acute distress  Eyes:  anicteric, no conjunctival injection, no discharge  Oropharynx: no lesions or injection 	  Neck: supple, without adenopathy  Lungs: diminished at bases  Heart: regular rate and rhythm; no murmur, rubs or gallops  Abdomen: soft, distended, nontender, without mass or organomegaly  Skin: no lesions, back incision clean, drain removed  Extremities: no clubbing, cyanosis, or edema  Neurologic: alert, follows commands, moves all extremities    LAB RESULTS:                                              9.9    8.13  )-----------( 226      ( 2023 06:14 )             32.5   01-08    140  |  108  |  28<H>  ----------------------------<  134<H>  3.8   |  23  |  0.74    Ca    9.0      2023 06:13                Urinalysis Basic - ( 2023 22:28 )    Color: Yellow / Appearance: Clear / S.029 / pH: x  Gluc: x / Ketone: Negative  / Bili: Negative / Urobili: 2 mg/dL   Blood: x / Protein: Trace / Nitrite: Negative   Leuk Esterase: Negative / RBC: 6 /hpf / WBC 2 /HPF   Sq Epi: x / Non Sq Epi: 0 /hpf / Bacteria: Negative        MICROBIOLOGY REVIEWED:      Culture - Blood (23 @ 21:50)   Specimen Source: .Blood Blood-Peripheral   Culture Results:   No growth to date.   RADIOLOGY REVIEWED:  < from: CT Chest No Cont (23 @ 17:56) >  Small lateral pleural effusions, left greater than right with adjacent   compressive atelectasis.    < end of copied text >      < from: Xray Abdomen 1 View PORTABLE -Urgent (Xray Abdomen 1 View PORTABLE -Urgent .) (23 @ 20:59) >    IMPRESSION:  Gaseous distention of the stomach. Otherwise, nonobstructive bowel gas   pattern.    --- End of Report ---    < end of copied text >

## 2023-01-09 LAB
GLUCOSE BLDC GLUCOMTR-MCNC: 113 MG/DL — HIGH (ref 70–99)
GLUCOSE BLDC GLUCOMTR-MCNC: 137 MG/DL — HIGH (ref 70–99)
GLUCOSE BLDC GLUCOMTR-MCNC: 211 MG/DL — HIGH (ref 70–99)
GLUCOSE BLDC GLUCOMTR-MCNC: 252 MG/DL — HIGH (ref 70–99)

## 2023-01-09 RX ORDER — IPRATROPIUM/ALBUTEROL SULFATE 18-103MCG
3 AEROSOL WITH ADAPTER (GRAM) INHALATION EVERY 6 HOURS
Refills: 0 | Status: DISCONTINUED | OUTPATIENT
Start: 2023-01-09 | End: 2023-01-10

## 2023-01-09 RX ADMIN — ENOXAPARIN SODIUM 40 MILLIGRAM(S): 100 INJECTION SUBCUTANEOUS at 18:11

## 2023-01-09 RX ADMIN — Medication 1 MILLIGRAM(S): at 12:21

## 2023-01-09 RX ADMIN — SODIUM CHLORIDE 4 MILLILITER(S): 9 INJECTION INTRAMUSCULAR; INTRAVENOUS; SUBCUTANEOUS at 05:24

## 2023-01-09 RX ADMIN — SENNA PLUS 2 TABLET(S): 8.6 TABLET ORAL at 22:24

## 2023-01-09 RX ADMIN — Medication 100 MILLIGRAM(S): at 05:23

## 2023-01-09 RX ADMIN — Medication 25 MILLIGRAM(S): at 22:24

## 2023-01-09 RX ADMIN — MEMANTINE HYDROCHLORIDE 5 MILLIGRAM(S): 10 TABLET ORAL at 05:23

## 2023-01-09 RX ADMIN — Medication 25 MILLIGRAM(S): at 12:23

## 2023-01-09 RX ADMIN — Medication 0.25 MILLIGRAM(S): at 17:48

## 2023-01-09 RX ADMIN — Medication 25 MILLIGRAM(S): at 05:23

## 2023-01-09 RX ADMIN — DONEPEZIL HYDROCHLORIDE 10 MILLIGRAM(S): 10 TABLET, FILM COATED ORAL at 22:24

## 2023-01-09 RX ADMIN — Medication 3 MILLILITER(S): at 05:23

## 2023-01-09 RX ADMIN — Medication 0.25 MILLIGRAM(S): at 05:22

## 2023-01-09 RX ADMIN — Medication 6: at 16:31

## 2023-01-09 RX ADMIN — Medication 650 MILLIGRAM(S): at 09:30

## 2023-01-09 RX ADMIN — Medication 650 MILLIGRAM(S): at 08:24

## 2023-01-09 RX ADMIN — MEMANTINE HYDROCHLORIDE 5 MILLIGRAM(S): 10 TABLET ORAL at 17:48

## 2023-01-09 RX ADMIN — Medication 100 MILLIGRAM(S): at 22:24

## 2023-01-09 RX ADMIN — Medication 3 MILLILITER(S): at 12:20

## 2023-01-09 RX ADMIN — TAMSULOSIN HYDROCHLORIDE 0.4 MILLIGRAM(S): 0.4 CAPSULE ORAL at 22:24

## 2023-01-09 RX ADMIN — Medication 4: at 08:11

## 2023-01-09 RX ADMIN — Medication 100 MILLIGRAM(S): at 12:21

## 2023-01-09 NOTE — CHART NOTE - NSCHARTNOTEFT_GEN_A_CORE
Patient was fit and delivered a LSO with rigid anterior posterior and lateral panels. The LSO will stabilize and control the lumbar spine reduce pain and ROM, and allow for safe ambulation. Daniel and his family were educated on the care use and function of the orthosis. Contact info was given . All went without incident.   Tony LEIJA  [facility]  [phone]

## 2023-01-09 NOTE — PROGRESS NOTE ADULT - SUBJECTIVE AND OBJECTIVE BOX
Follow-up Pulm Progress Note    Secretions much improved  Awake, alert  O2 sats 98% RA  Denies CP, SOB    Medications:  MEDICATIONS  (STANDING):  albuterol/ipratropium for Nebulization 3 milliLiter(s) Nebulizer every 6 hours  bisacodyl 5 milliGRAM(s) Oral every 12 hours  buDESOnide    Inhalation Suspension 0.25 milliGRAM(s) Inhalation every 12 hours  dextrose 5%. 1000 milliLiter(s) (50 mL/Hr) IV Continuous <Continuous>  dextrose 5%. 1000 milliLiter(s) (100 mL/Hr) IV Continuous <Continuous>  dextrose 50% Injectable 25 Gram(s) IV Push once  dextrose 50% Injectable 12.5 Gram(s) IV Push once  dextrose 50% Injectable 25 Gram(s) IV Push once  donepezil 10 milliGRAM(s) Oral at bedtime  enoxaparin Injectable 40 milliGRAM(s) SubCutaneous <User Schedule>  folic acid 1 milliGRAM(s) Oral daily  glucagon  Injectable 1 milliGRAM(s) IntraMuscular once  insulin lispro (ADMELOG) corrective regimen sliding scale   SubCutaneous three times a day before meals  insulin lispro (ADMELOG) corrective regimen sliding scale   SubCutaneous at bedtime  memantine 5 milliGRAM(s) Oral two times a day  metoprolol tartrate 25 milliGRAM(s) Oral every 8 hours  polyethylene glycol 3350 17 Gram(s) Oral every 12 hours  pregabalin 100 milliGRAM(s) Oral every 8 hours  senna 2 Tablet(s) Oral at bedtime  sodium chloride 3%  Inhalation 4 milliLiter(s) Inhalation every 12 hours  tamsulosin 0.4 milliGRAM(s) Oral at bedtime    MEDICATIONS  (PRN):  acetaminophen     Tablet .. 650 milliGRAM(s) Oral every 6 hours PRN Temp greater or equal to 38C (100.4F), Mild Pain (1 - 3)  dextrose Oral Gel 15 Gram(s) Oral once PRN Blood Glucose LESS THAN 70 milliGRAM(s)/deciliter    Vital Signs Last 24 Hrs  T(C): 37 (09 Jan 2023 09:23), Max: 37.1 (08 Jan 2023 21:57)  T(F): 98.6 (09 Jan 2023 09:23), Max: 98.8 (09 Jan 2023 01:00)  HR: 85 (09 Jan 2023 09:23) (70 - 91)  BP: 115/74 (09 Jan 2023 09:23) (104/65 - 118/74)  BP(mean): --  RR: 18 (09 Jan 2023 09:23) (18 - 18)  SpO2: 98% (09 Jan 2023 09:23) (94% - 98%)    Parameters below as of 09 Jan 2023 09:23  Patient On (Oxygen Delivery Method): room air    01-08 @ 07:01  -  01-09 @ 07:00  --------------------------------------------------------  IN: 630 mL / OUT: 0 mL / NET: 630 mL      LABS:                        9.9    8.13  )-----------( 226      ( 08 Jan 2023 06:14 )             32.5     01-08    140  |  108  |  28<H>  ----------------------------<  134<H>  3.8   |  23  |  0.74    Ca    9.0      08 Jan 2023 06:13    CAPILLARY BLOOD GLUCOSE  POCT Blood Glucose.: 211 mg/dL (09 Jan 2023 07:30)    Procalcitonin, Serum: 0.16 ng/mL (01-07-23 @ 07:23)    CULTURES:     Culture - Blood (collected 01-02-23 @ 21:50)  Source: .Blood Blood-Peripheral  Final Report (01-08-23 @ 02:00):    No Growth Final    Culture - Blood (collected 01-02-23 @ 21:35)  Source: .Blood Blood-Peripheral  Final Report (01-08-23 @ 02:00):    No Growth Final    Physical Examination:  PULM: Clear to auscultation bilaterally  CVS: S1, S2 heard    RADIOLOGY REVIEWED  CXR: grossly clear    < from: CT Chest No Cont (01.06.23 @ 17:56) >    FINDINGS:    Lymph nodes: No lymphadenopathy.    Heart/vasculature: Heart is normal in size.  No pericardial effusion.   Coronary artery and aortic calcifications    Airways/lungs/pleura: Decreased AP diameter of the distal trachea and   proximal bronchi due to bowing of the posterior wall from exhalation   which can be seen with tracheobronchomalacia. Mild tracheal secretions.   Small bilateral pleural effusions, left greater than right with adjacent   compressive atelectasis in the bilateral lower lobes. No pneumothorax.    Upper abdomen: There is a 7.6 x 3.5 cm exophytic right upper pole renal   cyst.    Bones/soft tissues: Osseous degenerative changes. Partially imaged   posterior lumbar spinal fusion with laminectomy at L1.  Few remote right   rib fractures. Soft tissues are within normal limits.      IMPRESSION:    Small lateral pleural effusions, left greater than right with adjacent   compressive atelectasis.    --- End of Report ---      < end of copied text >

## 2023-01-09 NOTE — PROGRESS NOTE ADULT - ASSESSMENT
88yo M h/o dementia, Afib on Xarelto, BPH, HTN, HLD, DM2 presenting as transfer from [facility] after mechanical fall. Pt found to have L2 fracture, and small associated L psoas hematoma. TTE normal LV systolic function.  SOB & Wheezing possibly related to pulm edema 2/2 Afib rvr / reactive airway dz, pro BNP elevated .s/p Lasix  Seen by pulm, Started on nebs & Pulmicort    s/p L1-L4 percutaneous fusion 12/31/22 Course c/b fever,  CT chest 1/6 small b/l  pulm effusions/ compressive atelectasis. Completed 5 day course of empiric zosyn post op     Plan       Neuro stable. LSO brace requested   Resp status improved. Nebs to prn as per pulm. Continue Pulmicort  Vitals stable   c/h Afib-A/c held. Rate controlled on Metoprolol 25mg q8   Type 2 DM- sliding scale. Metformin for discharge   DVT ppx   D/w son at bedside.  Wants patient home with home care services . DME scripts given Possible d/c home in am  86yo M h/o dementia, Afib on Xarelto, BPH, HTN, HLD, DM2 presenting as transfer from [facility] after mechanical fall. Pt found to have L2 fracture, and small associated L psoas hematoma. TTE normal LV systolic function.  SOB & Wheezing possibly related to pulm edema 2/2 Afib rvr / reactive airway dz, pro BNP elevated .s/p Lasix  Seen by pulm, Started on nebs & Pulmicort    s/p L1-L4 percutaneous fusion 12/31/22 Course c/b fever,  CT chest 1/6 small b/l  pulm effusions/ compressive atelectasis. Completed 5 day course of empiric zosyn post op     Plan       Neuro stable. LSO brace requested   Resp status improved. Nebs to prn as per pulm. Continue Pulmicort  Vitals stable   c/h Afib-A/c held. Rate controlled on Metoprolol 25mg q8   Type 2 DM- sliding scale. Metformin for discharge   DVT ppx   D/w son at bedside.  Wants patient home with home care services . DME scripts given . Family requesting session with PT. Possible d/c home in am

## 2023-01-09 NOTE — PROGRESS NOTE ADULT - SUBJECTIVE AND OBJECTIVE BOX
SUBJECTIVE:   Patient seen & examined. Son at bedside   OVERNIGHT EVENTS: none    Vital Signs Last 24 Hrs  T(C): 37 (09 Jan 2023 09:23), Max: 37.1 (08 Jan 2023 21:57)  T(F): 98.6 (09 Jan 2023 09:23), Max: 98.8 (09 Jan 2023 01:00)  HR: 85 (09 Jan 2023 09:23) (70 - 91)  BP: 115/74 (09 Jan 2023 09:23) (113/63 - 118/74)  BP(mean): --  RR: 18 (09 Jan 2023 09:23) (18 - 18)  SpO2: 98% (09 Jan 2023 09:23) (94% - 98%)    Parameters below as of 09 Jan 2023 09:23  Patient On (Oxygen Delivery Method): room air        PHYSICAL EXAM:    Constitutional: No Acute Distress     Neurological: Awake alert Ox 2. Minimal verbal output   Follows  Commands, Moving all Extremities 4+/5 Lumbar incision staples C/D/I      Pulmonary: Clear to Auscultation,     Cardiovascular: S1, S2, Regular rate and rhythm     Gastrointestinal: Soft, Non-tender, Non-distended     Extremities: No calf tenderness     LABS:                        9.9    8.13  )-----------( 226      ( 08 Jan 2023 06:14 )             32.5    01-08    140  |  108  |  28<H>  ----------------------------<  134<H>  3.8   |  23  |  0.74    Ca    9.0      08 Jan 2023 06:13        IMAGING:         MEDICATIONS:    acetaminophen     Tablet .. 650 milliGRAM(s) Oral every 6 hours PRN Temp greater or equal to 38C (100.4F), Mild Pain (1 - 3)  donepezil 10 milliGRAM(s) Oral at bedtime  memantine 5 milliGRAM(s) Oral two times a day  pregabalin 100 milliGRAM(s) Oral every 8 hours  metoprolol tartrate 25 milliGRAM(s) Oral every 8 hours  albuterol/ipratropium for Nebulization 3 milliLiter(s) Nebulizer every 6 hours PRN Shortness of Breath and/or Wheezing  buDESOnide    Inhalation Suspension 0.25 milliGRAM(s) Inhalation every 12 hours  sodium chloride 3%  Inhalation 4 milliLiter(s) Inhalation every 12 hours  bisacodyl 5 milliGRAM(s) Oral every 12 hours  polyethylene glycol 3350 17 Gram(s) Oral every 12 hours  senna 2 Tablet(s) Oral at bedtime  tamsulosin 0.4 milliGRAM(s) Oral at bedtime  enoxaparin Injectable 40 milliGRAM(s) SubCutaneous <User Schedule>  folic acid 1 milliGRAM(s) Oral daily  glucagon  Injectable 1 milliGRAM(s) IntraMuscular once  insulin lispro (ADMELOG) corrective regimen sliding scale   SubCutaneous three times a day before meals  insulin lispro (ADMELOG) corrective regimen sliding scale   SubCutaneous at bedtime      DIET:

## 2023-01-09 NOTE — PROGRESS NOTE ADULT - PROBLEM SELECTOR PLAN 2
Febrile to 101.5 1/2  -Leukocytosis 1/1, now normalized   -BC with NGTD   -RVP/COVID negative  -CT chest with no clear PNA  -S/p ABX per ID   -Trend leukocytosis, fever curve. Afebrile overnight.

## 2023-01-10 ENCOUNTER — TRANSCRIPTION ENCOUNTER (OUTPATIENT)
Age: 88
End: 2023-01-10

## 2023-01-10 VITALS
HEART RATE: 80 BPM | OXYGEN SATURATION: 98 % | RESPIRATION RATE: 18 BRPM | DIASTOLIC BLOOD PRESSURE: 71 MMHG | SYSTOLIC BLOOD PRESSURE: 106 MMHG | TEMPERATURE: 98 F

## 2023-01-10 LAB
ANION GAP SERPL CALC-SCNC: 9 MMOL/L — SIGNIFICANT CHANGE UP (ref 5–17)
BUN SERPL-MCNC: 27 MG/DL — HIGH (ref 7–23)
CALCIUM SERPL-MCNC: 8.9 MG/DL — SIGNIFICANT CHANGE UP (ref 8.4–10.5)
CHLORIDE SERPL-SCNC: 112 MMOL/L — HIGH (ref 96–108)
CO2 SERPL-SCNC: 23 MMOL/L — SIGNIFICANT CHANGE UP (ref 22–31)
CREAT SERPL-MCNC: 0.76 MG/DL — SIGNIFICANT CHANGE UP (ref 0.5–1.3)
EGFR: 87 ML/MIN/1.73M2 — SIGNIFICANT CHANGE UP
GLUCOSE BLDC GLUCOMTR-MCNC: 119 MG/DL — HIGH (ref 70–99)
GLUCOSE BLDC GLUCOMTR-MCNC: 182 MG/DL — HIGH (ref 70–99)
GLUCOSE SERPL-MCNC: 137 MG/DL — HIGH (ref 70–99)
HCT VFR BLD CALC: 32.1 % — LOW (ref 39–50)
HGB BLD-MCNC: 10.2 G/DL — LOW (ref 13–17)
MCHC RBC-ENTMCNC: 30.6 PG — SIGNIFICANT CHANGE UP (ref 27–34)
MCHC RBC-ENTMCNC: 31.8 GM/DL — LOW (ref 32–36)
MCV RBC AUTO: 96.4 FL — SIGNIFICANT CHANGE UP (ref 80–100)
NRBC # BLD: 0 /100 WBCS — SIGNIFICANT CHANGE UP (ref 0–0)
PLATELET # BLD AUTO: 232 K/UL — SIGNIFICANT CHANGE UP (ref 150–400)
POTASSIUM SERPL-MCNC: 3.9 MMOL/L — SIGNIFICANT CHANGE UP (ref 3.5–5.3)
POTASSIUM SERPL-SCNC: 3.9 MMOL/L — SIGNIFICANT CHANGE UP (ref 3.5–5.3)
RBC # BLD: 3.33 M/UL — LOW (ref 4.2–5.8)
RBC # FLD: 15.2 % — HIGH (ref 10.3–14.5)
SODIUM SERPL-SCNC: 144 MMOL/L — SIGNIFICANT CHANGE UP (ref 135–145)
WBC # BLD: 7.06 K/UL — SIGNIFICANT CHANGE UP (ref 3.8–10.5)
WBC # FLD AUTO: 7.06 K/UL — SIGNIFICANT CHANGE UP (ref 3.8–10.5)

## 2023-01-10 PROCEDURE — 82803 BLOOD GASES ANY COMBINATION: CPT

## 2023-01-10 PROCEDURE — 85610 PROTHROMBIN TIME: CPT

## 2023-01-10 PROCEDURE — 85018 HEMOGLOBIN: CPT

## 2023-01-10 PROCEDURE — 84484 ASSAY OF TROPONIN QUANT: CPT

## 2023-01-10 PROCEDURE — 84132 ASSAY OF SERUM POTASSIUM: CPT

## 2023-01-10 PROCEDURE — 80307 DRUG TEST PRSMV CHEM ANLYZR: CPT

## 2023-01-10 PROCEDURE — 72148 MRI LUMBAR SPINE W/O DYE: CPT | Mod: MA

## 2023-01-10 PROCEDURE — 92610 EVALUATE SWALLOWING FUNCTION: CPT

## 2023-01-10 PROCEDURE — 97535 SELF CARE MNGMENT TRAINING: CPT

## 2023-01-10 PROCEDURE — 92612 ENDOSCOPY SWALLOW (FEES) VID: CPT

## 2023-01-10 PROCEDURE — 80053 COMPREHEN METABOLIC PANEL: CPT

## 2023-01-10 PROCEDURE — 97110 THERAPEUTIC EXERCISES: CPT

## 2023-01-10 PROCEDURE — 85014 HEMATOCRIT: CPT

## 2023-01-10 PROCEDURE — 82330 ASSAY OF CALCIUM: CPT

## 2023-01-10 PROCEDURE — 97530 THERAPEUTIC ACTIVITIES: CPT

## 2023-01-10 PROCEDURE — P9040: CPT

## 2023-01-10 PROCEDURE — 36415 COLL VENOUS BLD VENIPUNCTURE: CPT

## 2023-01-10 PROCEDURE — 71045 X-RAY EXAM CHEST 1 VIEW: CPT

## 2023-01-10 PROCEDURE — 76000 FLUOROSCOPY <1 HR PHYS/QHP: CPT

## 2023-01-10 PROCEDURE — 85027 COMPLETE CBC AUTOMATED: CPT

## 2023-01-10 PROCEDURE — 87040 BLOOD CULTURE FOR BACTERIA: CPT

## 2023-01-10 PROCEDURE — 82435 ASSAY OF BLOOD CHLORIDE: CPT

## 2023-01-10 PROCEDURE — C1769: CPT

## 2023-01-10 PROCEDURE — C8929: CPT

## 2023-01-10 PROCEDURE — 71250 CT THORAX DX C-: CPT

## 2023-01-10 PROCEDURE — 87637 SARSCOV2&INF A&B&RSV AMP PRB: CPT

## 2023-01-10 PROCEDURE — 97162 PT EVAL MOD COMPLEX 30 MIN: CPT

## 2023-01-10 PROCEDURE — 83690 ASSAY OF LIPASE: CPT

## 2023-01-10 PROCEDURE — 81003 URINALYSIS AUTO W/O SCOPE: CPT

## 2023-01-10 PROCEDURE — 85025 COMPLETE CBC W/AUTO DIFF WBC: CPT

## 2023-01-10 PROCEDURE — 83735 ASSAY OF MAGNESIUM: CPT

## 2023-01-10 PROCEDURE — 99291 CRITICAL CARE FIRST HOUR: CPT

## 2023-01-10 PROCEDURE — 83036 HEMOGLOBIN GLYCOSYLATED A1C: CPT

## 2023-01-10 PROCEDURE — 85730 THROMBOPLASTIN TIME PARTIAL: CPT

## 2023-01-10 PROCEDURE — 86850 RBC ANTIBODY SCREEN: CPT

## 2023-01-10 PROCEDURE — C1713: CPT

## 2023-01-10 PROCEDURE — 80048 BASIC METABOLIC PNL TOTAL CA: CPT

## 2023-01-10 PROCEDURE — 92526 ORAL FUNCTION THERAPY: CPT

## 2023-01-10 PROCEDURE — 84295 ASSAY OF SERUM SODIUM: CPT

## 2023-01-10 PROCEDURE — 0225U NFCT DS DNA&RNA 21 SARSCOV2: CPT

## 2023-01-10 PROCEDURE — 86923 COMPATIBILITY TEST ELECTRIC: CPT

## 2023-01-10 PROCEDURE — 70450 CT HEAD/BRAIN W/O DYE: CPT

## 2023-01-10 PROCEDURE — U0003: CPT

## 2023-01-10 PROCEDURE — 36430 TRANSFUSION BLD/BLD COMPNT: CPT

## 2023-01-10 PROCEDURE — 84145 PROCALCITONIN (PCT): CPT

## 2023-01-10 PROCEDURE — 82962 GLUCOSE BLOOD TEST: CPT

## 2023-01-10 PROCEDURE — 83880 ASSAY OF NATRIURETIC PEPTIDE: CPT

## 2023-01-10 PROCEDURE — C1889: CPT

## 2023-01-10 PROCEDURE — 81001 URINALYSIS AUTO W/SCOPE: CPT

## 2023-01-10 PROCEDURE — 83605 ASSAY OF LACTIC ACID: CPT

## 2023-01-10 PROCEDURE — 82947 ASSAY GLUCOSE BLOOD QUANT: CPT

## 2023-01-10 PROCEDURE — 86900 BLOOD TYPING SEROLOGIC ABO: CPT

## 2023-01-10 PROCEDURE — 72020 X-RAY EXAM OF SPINE 1 VIEW: CPT

## 2023-01-10 PROCEDURE — 74018 RADEX ABDOMEN 1 VIEW: CPT

## 2023-01-10 PROCEDURE — C9399: CPT

## 2023-01-10 PROCEDURE — 86901 BLOOD TYPING SEROLOGIC RH(D): CPT

## 2023-01-10 PROCEDURE — 84100 ASSAY OF PHOSPHORUS: CPT

## 2023-01-10 PROCEDURE — 93005 ELECTROCARDIOGRAM TRACING: CPT

## 2023-01-10 PROCEDURE — 94640 AIRWAY INHALATION TREATMENT: CPT

## 2023-01-10 RX ORDER — WARFARIN SODIUM 2.5 MG/1
0 TABLET ORAL
Qty: 0 | Refills: 0 | DISCHARGE

## 2023-01-10 RX ORDER — LISINOPRIL 2.5 MG/1
5 TABLET ORAL
Qty: 0 | Refills: 0 | DISCHARGE

## 2023-01-10 RX ORDER — METFORMIN HYDROCHLORIDE 850 MG/1
0 TABLET ORAL
Qty: 0 | Refills: 0 | DISCHARGE

## 2023-01-10 RX ORDER — LISINOPRIL 2.5 MG/1
0 TABLET ORAL
Qty: 0 | Refills: 0 | DISCHARGE

## 2023-01-10 RX ORDER — METOPROLOL TARTRATE 50 MG
1 TABLET ORAL
Qty: 60 | Refills: 0
Start: 2023-01-10 | End: 2023-02-08

## 2023-01-10 RX ORDER — METOPROLOL TARTRATE 50 MG
1 TABLET ORAL
Qty: 30 | Refills: 0
Start: 2023-01-10 | End: 2023-02-08

## 2023-01-10 RX ORDER — BUDESONIDE, MICRONIZED 100 %
1 POWDER (GRAM) MISCELLANEOUS
Qty: 1 | Refills: 0
Start: 2023-01-10 | End: 2023-02-08

## 2023-01-10 RX ORDER — RIVAROXABAN 15 MG-20MG
20 KIT ORAL
Qty: 0 | Refills: 0 | DISCHARGE

## 2023-01-10 RX ORDER — DOXAZOSIN MESYLATE 4 MG
1 TABLET ORAL
Qty: 0 | Refills: 0 | DISCHARGE

## 2023-01-10 RX ORDER — TAMSULOSIN HYDROCHLORIDE 0.4 MG/1
1 CAPSULE ORAL
Qty: 0 | Refills: 0 | DISCHARGE

## 2023-01-10 RX ORDER — METOPROLOL TARTRATE 50 MG
1 TABLET ORAL
Qty: 0 | Refills: 0 | DISCHARGE

## 2023-01-10 RX ORDER — ACETAMINOPHEN 500 MG
2 TABLET ORAL
Qty: 0 | Refills: 0 | DISCHARGE
Start: 2023-01-10

## 2023-01-10 RX ORDER — FOLIC ACID 0.8 MG
0 TABLET ORAL
Qty: 0 | Refills: 0 | DISCHARGE

## 2023-01-10 RX ORDER — RIVAROXABAN 15 MG-20MG
0 KIT ORAL
Qty: 0 | Refills: 0 | DISCHARGE

## 2023-01-10 RX ORDER — SENNA PLUS 8.6 MG/1
2 TABLET ORAL
Qty: 60 | Refills: 0
Start: 2023-01-10 | End: 2023-02-08

## 2023-01-10 RX ADMIN — Medication 25 MILLIGRAM(S): at 13:56

## 2023-01-10 RX ADMIN — Medication 5 MILLIGRAM(S): at 06:08

## 2023-01-10 RX ADMIN — Medication 2: at 07:32

## 2023-01-10 RX ADMIN — Medication 25 MILLIGRAM(S): at 06:07

## 2023-01-10 RX ADMIN — POLYETHYLENE GLYCOL 3350 17 GRAM(S): 17 POWDER, FOR SOLUTION ORAL at 06:07

## 2023-01-10 RX ADMIN — Medication 0.25 MILLIGRAM(S): at 06:07

## 2023-01-10 RX ADMIN — Medication 100 MILLIGRAM(S): at 06:07

## 2023-01-10 RX ADMIN — MEMANTINE HYDROCHLORIDE 5 MILLIGRAM(S): 10 TABLET ORAL at 06:06

## 2023-01-10 RX ADMIN — Medication 3 MILLILITER(S): at 01:02

## 2023-01-10 RX ADMIN — Medication 100 MILLIGRAM(S): at 13:56

## 2023-01-10 RX ADMIN — Medication 1 MILLIGRAM(S): at 15:33

## 2023-01-10 NOTE — PROGRESS NOTE ADULT - ASSESSMENT
88yo M h/o dementia, Afib on Xarelto, BPH, HTN, HLD, DM2 presenting as transfer from [facility] after mechanical fall. Pt found to have L2 fracture, and small associated L psoas hematoma. TTE normal LV systolic function.  SOB & Wheezing possibly related to pulm edema 2/2 Afib rvr / reactive airway dz, pro BNP elevated .s/p Lasix  Seen by pulm, Started on nebs & Pulmicort    s/p L1-L4 percutaneous fusion 12/31/22 Course c/b fever,  CT chest 1/6 small b/l  pulm effusions/ compressive atelectasis. Completed 5 day course of empiric zosyn post op . pod #10     Plan       Neuro stable. LSO brace in place . Staples discontinued Site C/D    Resp status improved. No O2 needs.  Continue Pulmicort  Vitals stable   c/h Afib- Xarelto on hold. Rate controlled on Metoprolol 25mg q8   Type 2 DM- sliding scale. Metformin for discharge   DVT ppx   D/w son at bedside.  Wants patient home with home care services. d/c home today

## 2023-01-10 NOTE — PROGRESS NOTE ADULT - PROBLEM SELECTOR PLAN 6
Per hx  -Supportive care per primary team.

## 2023-01-10 NOTE — PROGRESS NOTE ADULT - SUBJECTIVE AND OBJECTIVE BOX
Follow-up Pulm Progress Note    Awake, alert  Denies CP, sOB  O2 sats 97% RA    Medications:  MEDICATIONS  (STANDING):  buDESOnide    Inhalation Suspension 0.25 milliGRAM(s) Inhalation every 12 hours  dextrose 5%. 1000 milliLiter(s) (50 mL/Hr) IV Continuous <Continuous>  dextrose 5%. 1000 milliLiter(s) (100 mL/Hr) IV Continuous <Continuous>  dextrose 50% Injectable 25 Gram(s) IV Push once  dextrose 50% Injectable 12.5 Gram(s) IV Push once  dextrose 50% Injectable 25 Gram(s) IV Push once  donepezil 10 milliGRAM(s) Oral at bedtime  enoxaparin Injectable 40 milliGRAM(s) SubCutaneous <User Schedule>  folic acid 1 milliGRAM(s) Oral daily  glucagon  Injectable 1 milliGRAM(s) IntraMuscular once  insulin lispro (ADMELOG) corrective regimen sliding scale   SubCutaneous three times a day before meals  insulin lispro (ADMELOG) corrective regimen sliding scale   SubCutaneous at bedtime  memantine 5 milliGRAM(s) Oral two times a day  metoprolol tartrate 25 milliGRAM(s) Oral every 8 hours  polyethylene glycol 3350 17 Gram(s) Oral every 12 hours  pregabalin 100 milliGRAM(s) Oral every 8 hours  senna 2 Tablet(s) Oral at bedtime  tamsulosin 0.4 milliGRAM(s) Oral at bedtime    MEDICATIONS  (PRN):  acetaminophen     Tablet .. 650 milliGRAM(s) Oral every 6 hours PRN Temp greater or equal to 38C (100.4F), Mild Pain (1 - 3)  albuterol/ipratropium for Nebulization 3 milliLiter(s) Nebulizer every 6 hours PRN Shortness of Breath and/or Wheezing  dextrose Oral Gel 15 Gram(s) Oral once PRN Blood Glucose LESS THAN 70 milliGRAM(s)/deciliter      Vital Signs Last 24 Hrs  T(C): 37 (10 Sami 2023 08:52), Max: 37.3 (10 Sami 2023 00:47)  T(F): 98.6 (10 Sami 2023 08:52), Max: 99.1 (10 Sami 2023 00:47)  HR: 73 (10 Sami 2023 08:52) (73 - 87)  BP: 106/60 (10 Sami 2023 08:52) (106/59 - 128/78)  BP(mean): --  RR: 18 (10 Sami 2023 08:52) (18 - 18)  SpO2: 96% (10 Sami 2023 08:52) (96% - 98%)    Parameters below as of 10 Sami 2023 08:52  Patient On (Oxygen Delivery Method): room air    01-09 @ 07:01  -  01-10 @ 07:00  --------------------------------------------------------  IN: 230 mL / OUT: 0 mL / NET: 230 mL          LABS:                        10.2   7.06  )-----------( 232      ( 10 Sami 2023 06:11 )             32.1     01-10    144  |  112<H>  |  27<H>  ----------------------------<  137<H>  3.9   |  23  |  0.76    Ca    8.9      10 Sami 2023 06:11      CAPILLARY BLOOD GLUCOSE  POCT Blood Glucose.: 119 mg/dL (10 Sami 2023 11:15)    CULTURES    Culture - Blood (collected 01-02-23 @ 21:50)  Source: .Blood Blood-Peripheral  Final Report (01-08-23 @ 02:00):    No Growth Final    Culture - Blood (collected 01-02-23 @ 21:35)  Source: .Blood Blood-Peripheral  Final Report (01-08-23 @ 02:00):    No Growth Final    Physical Examination:  PULM: Clear to auscultation bilaterally  CVS: S1, S2 heard    RADIOLOGY REVIEWED    CT chest:< from: CT Chest No Cont (01.06.23 @ 17:56) >  FINDINGS:    Lymph nodes: No lymphadenopathy.    Heart/vasculature: Heart is normal in size.  No pericardial effusion.   Coronary artery and aortic calcifications    Airways/lungs/pleura: Decreased AP diameter of the distal trachea and   proximal bronchi due to bowing of the posterior wall from exhalation   which can be seen with tracheobronchomalacia. Mild tracheal secretions.   Small bilateral pleural effusions, left greater than right with adjacent   compressive atelectasis in the bilateral lower lobes. No pneumothorax.    Upper abdomen: There is a 7.6 x 3.5 cm exophytic right upper pole renal   cyst.    Bones/soft tissues: Osseous degenerative changes. Partially imaged   posterior lumbar spinal fusion with laminectomy at L1.  Few remote right   rib fractures. Soft tissues are within normal limits.      IMPRESSION:    Small lateral pleural effusions, left greater than right with adjacent   compressive atelectasis.    --- End of Report ---    < end of copied text >

## 2023-01-10 NOTE — PROGRESS NOTE ADULT - REASON FOR ADMISSION
Transfer level 2 trauma
Patient was admitted after a fall with L2 fracture
Patient was admitted after a fall with L2 fracture.
Transfer level 2 trauma

## 2023-01-10 NOTE — DISCHARGE NOTE PROVIDER - NSDCCPCAREPLAN_GEN_ALL_CORE_FT
PRINCIPAL DISCHARGE DIAGNOSIS  Diagnosis: L2 vertebral fracture  Assessment and Plan of Treatment: s/p L1-L4 percutaneous fusion 12/31/22  No bending back. No heavy lifting. No twisting back..  Do not take Aspirin Ibuprofen  (Motrin)  , Naproxen ( aleve)  or Meloxicam  for pain.  No strenous activity. No heavy lifting. Do not return to work until cleared by physician. No driving until cleared by physician.  May restart  Xarelto on 1/21/23   Follow up with Dr Hector in 2 weeks  LSO brace when OOB/ ambulating  Assistance & supervision with all functional activities         SECONDARY DISCHARGE DIAGNOSES  Diagnosis: Dysphagia  Assessment and Plan of Treatment: Pureed diet with moderatewly thickened fluids  . Small sips & feeds . Meds in apple sauce    Diagnosis: Hyperlipemia  Assessment and Plan of Treatment: Continue simvastatin    Diagnosis: Diabetes mellitus  Assessment and Plan of Treatment: Continue metformin    Diagnosis: Chronic atrial fibrillation  Assessment and Plan of Treatment: May restart  Xarelto on 1/21/23  Metoprolol twice daily for rate control    Diagnosis: Fever  Assessment and Plan of Treatment: Resolved. Treated with empiric zosyn    Diagnosis: Acute non-cardiogenic pulmonary edema  Assessment and Plan of Treatment: Treated with diuretics & nebulizers    Diagnosis: Hematoma of psoas region due to anticoagulant therapy  Assessment and Plan of Treatment: Traumatic. Serial CBC stable    Diagnosis: Hypertension  Assessment and Plan of Treatment: -110. Antihypertensive ( Lisinopril) held

## 2023-01-10 NOTE — PROGRESS NOTE ADULT - THIS PATIENT HAS THE FOLLOWING CONDITION(S)/DIAGNOSES ON THIS ADMISSION:
None
Acute Blood Loss Anemia
None

## 2023-01-10 NOTE — DISCHARGE NOTE PROVIDER - HOSPITAL COURSE
86yo M h/o dementia, Afib on Xarelto, BPH, HTN, HLD, DM2 presenting as transfer from [facility] after mechanical fall. Pt found to have L2 fracture, and small associated L psoas hematoma. TTE normal LV systolic function. Noted to have  SOB & Wheezing preop  possibly related to pulmonary edema secondary to  Afib with rapid ventricular rate  / reactive airway disease, pro BNP elevated .Treated with  Lasix  Seen by pulmonary. Chest PT  & nebulizer treatment with oxygen supplement.     s/p L1-L4 percutaneous fusion 12/31/22 Course c/b fever,  CT chest 1/6/22 small b/l  pulm effusions/ compressive atelectasis. Completed 5 day course of empiric zosyn post op . Respiratory status improved . Rate controlled on telemetry monitor. Seen & followed by medicine & pulmonary & Infectious disease . Evaluated by PT & recommended for KEENAN. Family wishes to take patient with Home PT & Home care services. 88yo M h/o dementia, Afib on Xarelto, BPH, HTN, HLD, DM2 presenting as transfer from [facility] after mechanical fall. Pt found to have L2 fracture, and small associated L psoas hematoma. TTE normal LV systolic function. Noted to have  SOB & Wheezing preop  possibly related to pulmonary edema secondary to  Afib with rapid ventricular rate  / reactive airway disease, pro BNP elevated .Treated with  Lasix  Seen by pulmonary. Chest PT  & nebulizer treatment with oxygen supplement.     s/p L1-L4 percutaneous fusion 12/31/22 Course c/b fever,  CT chest 1/6/22 small b/l  pulm effusions/ compressive atelectasis. Completed 5 day course of empiric zosyn post op . Respiratory status improved. No oxygen or suctioning requirements  . Rate controlled on telemetry monitor. Seen & followed by medicine & pulmonary & Infectious disease . Evaluated by PT & recommended for KEENAN. Family wishes to take patient with Home PT & Home care services.

## 2023-01-10 NOTE — DISCHARGE NOTE PROVIDER - CARE PROVIDER_API CALL
Crescencio Hector)  Neurosurgery  [address] [facility] [address]  [place] [address]  Phone: [phone]  Fax: [phone]  Follow Up Time:     Pam Henriquez  INTERNAL MEDICINE  [address] [place] [address]  Phone: [phone]  Fax: [phone]  Follow Up Time:     Fabian Velázquez)  Critical Care Medicine  [address] [facility] [address]  [place] [address]  Phone: [phone]  Fax: [phone]  Follow Up Time:

## 2023-01-10 NOTE — DISCHARGE NOTE PROVIDER - NSDCMRMEDTOKEN_GEN_ALL_CORE_FT
bacitracin 500 units/g topical ointment: Apply topically to affected area 4 times a day   Calcium 600+D oral tablet:  orally once a day  Commode 3:1: S/P L1-4 Perc Screws  doxazosin 2 mg oral tablet: 1 tab(s) orally once a day  folic acid:  orally once a day  Hospital Bed: S/P L1-4 Perc Screws  Odette Lift: S/P L1-4 Perc Screws  lisinopril:   LSO brace : L2 fracture. s/p L1-L4 fusion   Lyrica 100 mg oral capsule:  orally 3 times a day  metFORMIN:   metoprolol succinate 25 mg oral tablet, extended release: 1 tab(s) orally once a day  Rolling Walker: S/PL1-4 Perc Screws      tamsulosin 0.4 mg oral capsule: 1 cap(s) orally once a day  Transfer wheelchair : dispense #1  use as needed   warfarin:  orally   Xarelto: orally once a day   acetaminophen 500 mg oral tablet: 2 tab(s) orally every 6 to 8 hours, As Needed - for moderate pain  donepezil 10 mg oral tablet: 1 tab(s) orally once a day (at bedtime)  memantine 5 mg oral tablet: 1 tab(s) orally 2 times a day  metFORMIN: 500 milligram(s) orally once a day  metoprolol tartrate 25 mg oral tablet: 1 tab(s) orally every 12 hours   Pulmicort Flexhaler 180 mcg/inh inhalation powder: 1 puff(s) inhaled 2 times a day   senna leaf extract oral tablet: 2 tab(s) orally once a day (at bedtime)  sertraline 25 mg oral tablet: 1 tab(s) orally once a day  simvastatin 10 mg oral tablet: 1 tab(s) orally once a day (at bedtime)  tamsulosin 0.4 mg oral capsule: 1 cap(s) orally once a day

## 2023-01-10 NOTE — PROGRESS NOTE ADULT - PROVIDER SPECIALTY LIST ADULT
Cardiology
Infectious Disease
Internal Medicine
Internal Medicine
Neurosurgery
Infectious Disease
Internal Medicine
NSICU
NSICU
Pulmonology
Trauma Surgery
Trauma Surgery
Infectious Disease
Internal Medicine
Neurosurgery
Trauma Surgery
Internal Medicine
Internal Medicine
NSICU
NSICU
Neurosurgery
SICU
NSICU
Pulmonology
Pulmonology

## 2023-01-10 NOTE — PROGRESS NOTE ADULT - PROBLEM SELECTOR PLAN 4
S/p fall, found to have L2 spine fracture  -Sp L1-L4 lumbar fusion 12/31  -Per neurosurgery.

## 2023-01-10 NOTE — PROGRESS NOTE ADULT - SUBJECTIVE AND OBJECTIVE BOX
SUBJECTIVE:   Patient seen & examined . Doing well Ambulated   OVERNIGHT EVENTS: none    Vital Signs Last 24 Hrs  T(C): 37 (10 Sami 2023 08:52), Max: 37.3 (10 Sami 2023 00:47)  T(F): 98.6 (10 Sami 2023 08:52), Max: 99.1 (10 Sami 2023 00:47)  HR: 73 (10 Sami 2023 08:52) (73 - 87)  BP: 106/60 (10 Sami 2023 08:52) (106/59 - 128/78)  BP(mean): --  RR: 18 (10 Sami 2023 08:52) (18 - 18)  SpO2: 96% (10 Sami 2023 08:52) (96% - 98%)    Parameters below as of 10 Sami 2023 08:52  Patient On (Oxygen Delivery Method): room air        PHYSICAL EXAM:    Constitutional: No Acute Distress     Neurological: Awake alert Ox 2. Minimal verbal output   Follows  Commands, Moving all Extremities 4+/5 Lumbar incision staples C/D/I      Pulmonary: Clear to Auscultation,     Cardiovascular: S1, S2, Regular rate and rhythm     Gastrointestinal: Soft, Non-tender, Non-distended     LABS:                        10.2   7.06  )-----------( 232      ( 10 Sami 2023 06:11 )             32.1    01-10    144  |  112<H>  |  27<H>  ----------------------------<  137<H>  3.9   |  23  |  0.76    Ca    8.9      10 Sami 2023 06:11          IMAGING:         MEDICATIONS:    acetaminophen     Tablet .. 650 milliGRAM(s) Oral every 6 hours PRN Temp greater or equal to 38C (100.4F), Mild Pain (1 - 3)  donepezil 10 milliGRAM(s) Oral at bedtime  memantine 5 milliGRAM(s) Oral two times a day  pregabalin 100 milliGRAM(s) Oral every 8 hours  metoprolol tartrate 25 milliGRAM(s) Oral every 8 hours  albuterol/ipratropium for Nebulization 3 milliLiter(s) Nebulizer every 6 hours PRN Shortness of Breath and/or Wheezing  buDESOnide    Inhalation Suspension 0.25 milliGRAM(s) Inhalation every 12 hours  polyethylene glycol 3350 17 Gram(s) Oral every 12 hours  senna 2 Tablet(s) Oral at bedtime  tamsulosin 0.4 milliGRAM(s) Oral at bedtime  dextrose Oral Gel 15 Gram(s) Oral once PRN Blood Glucose LESS THAN 70 milliGRAM(s)/deciliter  enoxaparin Injectable 40 milliGRAM(s) SubCutaneous <User Schedule>  folic acid 1 milliGRAM(s) Oral daily  glucagon  Injectable 1 milliGRAM(s) IntraMuscular once  insulin lispro (ADMELOG) corrective regimen sliding scale   SubCutaneous three times a day before meals  insulin lispro (ADMELOG) corrective regimen sliding scale   SubCutaneous at bedtime      DIET:

## 2023-01-10 NOTE — PROGRESS NOTE ADULT - PROBLEM SELECTOR PLAN 7
W/ episodes of RVR  -AC held 2nd psoas hematoma, on DVT ppx  -Cards f/u.

## 2023-01-10 NOTE — PROGRESS NOTE ADULT - SUBJECTIVE AND OBJECTIVE BOX
CC: f/u for fever    Patient reports: he is non verbal ,he offers no complaints    REVIEW OF SYSTEMS:  All other review of systems negative (Comprehensive ROS): limited, non verbal    Antimicrobials Day #  :off    Other Medications Reviewed  MEDICATIONS  (STANDING):  buDESOnide    Inhalation Suspension 0.25 milliGRAM(s) Inhalation every 12 hours  dextrose 5%. 1000 milliLiter(s) (50 mL/Hr) IV Continuous <Continuous>  dextrose 5%. 1000 milliLiter(s) (100 mL/Hr) IV Continuous <Continuous>  dextrose 50% Injectable 25 Gram(s) IV Push once  dextrose 50% Injectable 12.5 Gram(s) IV Push once  dextrose 50% Injectable 25 Gram(s) IV Push once  donepezil 10 milliGRAM(s) Oral at bedtime  enoxaparin Injectable 40 milliGRAM(s) SubCutaneous <User Schedule>  folic acid 1 milliGRAM(s) Oral daily  glucagon  Injectable 1 milliGRAM(s) IntraMuscular once  insulin lispro (ADMELOG) corrective regimen sliding scale   SubCutaneous three times a day before meals  insulin lispro (ADMELOG) corrective regimen sliding scale   SubCutaneous at bedtime  memantine 5 milliGRAM(s) Oral two times a day  metoprolol tartrate 25 milliGRAM(s) Oral every 8 hours  polyethylene glycol 3350 17 Gram(s) Oral every 12 hours  pregabalin 100 milliGRAM(s) Oral every 8 hours  senna 2 Tablet(s) Oral at bedtime  tamsulosin 0.4 milliGRAM(s) Oral at bedtime    T(F): 97.6 (01-10-23 @ 12:50), Max: 99.1 (01-10-23 @ 00:47)  HR: 80 (01-10-23 @ 12:50)  BP: 106/71 (01-10-23 @ 12:50)  RR: 18 (01-10-23 @ 12:50)  SpO2: 98% (01-10-23 @ 12:50)  Wt(kg): --    PHYSICAL EXAM:  General: lethargic,, no acute distress  Eyes:  anicteric, no conjunctival injection, no discharge  Oropharynx: no lesions or injection 	  Neck: supple, without adenopathy  Lungs: clear to auscultation  Heart: irregular rate and rhythm; no murmur, rubs or gallops  Abdomen: soft, nondistended, nontender, without mass or organomegaly  Skin: no rash  Extremities: no clubbing, cyanosis, or edema  Neurologic: poorly interactive    LAB RESULTS:                        10.2   7.06  )-----------( 232      ( 10 Sami 2023 06:11 )             32.1     01-10    144  |  112<H>  |  27<H>  ----------------------------<  137<H>  3.9   |  23  |  0.76    Ca    8.9      10 Sami 2023 06:11          MICROBIOLOGY:  RECENT CULTURES:      RADIOLOGY REVIEWED:    < from: Xray Chest 1 View- PORTABLE-Routine (Xray Chest 1 View- PORTABLE-Routine .) (01.08.23 @ 17:58) >    IMPRESSION:  Small left effusion.    < end of copied text >  < from: CT Chest No Cont (01.06.23 @ 17:56) >  IMPRESSION:    Small lateral pleural effusions, left greater than right with adjacent   compressive atelectasis.    --- End of Report ---    < end of copied text >

## 2023-01-10 NOTE — DISCHARGE NOTE NURSING/CASE MANAGEMENT/SOCIAL WORK - NSDCPEFALRISK_GEN_ALL_CORE
For information on Fall & Injury Prevention, visit: [email or web address] OR  [email or web address] OR  https://www.cdc.gov/steadi/patient.html

## 2023-01-10 NOTE — DISCHARGE NOTE PROVIDER - PROVIDER TOKENS
PROVIDER:[TOKEN:[[id number]]],PROVIDER:[TOKEN:[[id number]]],PROVIDER:[TOKEN:[[id number]]]

## 2023-01-10 NOTE — DISCHARGE NOTE PROVIDER - NSDCFUADDINST_GEN_ALL_CORE_FT
Return to ER if develops fevers, bleeding , wound drainage, uncontrolled pain, weakness of extremities, lethargy or sluggishness..  Fall precautions   Assistance & supervision with all functional activities

## 2023-01-10 NOTE — DISCHARGE NOTE NURSING/CASE MANAGEMENT/SOCIAL WORK - PATIENT PORTAL LINK FT
You can access the FollowMyHealth Patient Portal offered by [facility] by registering at the following website: [email or web address]. By joining [facility]’s FollowMyHealth portal, you will also be able to view your health information using other applications (apps) compatible with our system.

## 2023-01-10 NOTE — PROGRESS NOTE ADULT - PROBLEM SELECTOR PLAN 5
CT with L psoas muscle hematoma  -AC for afib held, on DVT ppx  -Monitor H/H.

## 2023-01-10 NOTE — PROGRESS NOTE ADULT - PROBLEM SELECTOR PLAN 2
Febrile to 101.5 1/2  -Leukocytosis 1/1, now normalized   -BC with NGTD   -RVP/COVID negative  -CT chest with no clear PNA  -S/p ABX per ID   -Trend leukocytosis, fever curve. Afebrile now.

## 2023-01-10 NOTE — PROGRESS NOTE ADULT - PROBLEM SELECTOR PLAN 1
CXR grossly clear  -CT chest with small b/l pl effusions, no clear PNA. F/u official report  -Initially with mild dyspnea, congested cough with thick secretions via deep oral suctioning  -Secretions, dyspnea much improved today. Breathing comfortably on RA, minimal secretions on exam  -Continue Duoneb q6h  -Continue Mucomyst 20% 3ml q6h x3 days (give with Duoneb)  -Chest PT  -Suction PRN  -Keep sats >90% with O2 PRN
CXR grossly clear  -CT chest with small b/l pl effusions, no clear PNA  -Initially with mild dyspnea, congested cough with thick secretions via deep oral suctioning  -Dyspnea much improved. Breathing comfortably on RA, now minimal secretions on exam  -Duoneb q6h PRN, can d/c on discharge  -S/p Mucomyst, secretions now improved  -Chest PT  -Suction PRN  -Keep sats >90% with O2 PRN  -D/c planning per primary team.
CXR grossly clear  -CT chest with small b/l pl effusions, no clear PNA.   -Initially with mild dyspnea, congested cough with thick secretions via deep oral suctioning  -Dyspnea much improved. Breathing comfortably on RA, now minimal secretions on exam  -Continue Duoneb q6h  -S/p Mucomyst, secretions much improved  -Chest PT  -Suction PRN  -Keep sats >90% with O2 PRN.

## 2023-01-10 NOTE — PROGRESS NOTE ADULT - ASSESSMENT
88y male with hx dementia, afib, BPH, HTN, HLD, and DM2 who presented as a transfer from [facility] after mechanical fall. He was found to have L2 fracture and associated L psoas hematoma. Admitted to [facility] 12/28, now s/p L1-L4 percutaneous fusion. Post op pulm edema 2/2 Afib rvr / Acute CHF, s/p lasix, nebs. He also found to have large hematoma within expanded left psoas muscle extending into the left iliacus muscle. He had fevers and hence ID eval requested. He was started  on IV Zosyn. Pt is unable to offer any specific c/o. He has been constipated as per family, no  symptoms, but had cough and SOB and felt warm   etiology of fever likely multifactorial possible due to atelectasis, constipation and large Left RP hematoma  5 days of zosyn completed 1/7  Ct without signs of pneumonia  SUGGEST:  1. monitor off antibiotics  2.No ID objection with discharge planning  3.With no signs of active infection and no additional ID w/u planned we will stop actively following. Please call if ID issues arise

## 2023-01-18 NOTE — ED ADULT NURSE NOTE - PYSCHOSOCIAL ASSESSMENT
Patient reports non-bloody diarrhea for 6 days, associated with loss of appetite, nausea, non-bloody vomiting, lethargy, chills.  Her last chemotherapy was on 1/6/2023.  Creatinine was 1.4 on admission with significant electrolyte abnormalities, including Calcium, Magnesium, Phos.  Uric Acid was 13.2.  No significant diarrhea while in the ED.  Recent stool studies on 12/25/2023 negative.  Given 1.5L NS in ED.  -C Diff ordered  -Continue with IV fluids  -Replaced electrolytes         WDL

## 2023-01-25 ENCOUNTER — RESULT REVIEW (OUTPATIENT)
Age: 88
End: 2023-01-25

## 2023-01-31 ENCOUNTER — APPOINTMENT (OUTPATIENT)
Dept: NEUROSURGERY | Facility: CLINIC | Age: 88
End: 2023-01-31

## 2023-02-10 ENCOUNTER — OUTPATIENT (OUTPATIENT)
Dept: OUTPATIENT SERVICES | Facility: HOSPITAL | Age: 88
LOS: 1 days | End: 2023-02-10
Payer: COMMERCIAL

## 2023-02-10 ENCOUNTER — APPOINTMENT (OUTPATIENT)
Dept: RADIOLOGY | Facility: CLINIC | Age: 88
End: 2023-02-10
Payer: MEDICARE

## 2023-02-10 DIAGNOSIS — M54.50 LOW BACK PAIN, UNSPECIFIED: ICD-10-CM

## 2023-02-10 PROCEDURE — 72100 X-RAY EXAM L-S SPINE 2/3 VWS: CPT | Mod: 26

## 2023-02-10 PROCEDURE — 72100 X-RAY EXAM L-S SPINE 2/3 VWS: CPT

## 2023-02-13 ENCOUNTER — APPOINTMENT (OUTPATIENT)
Dept: SPINE | Facility: CLINIC | Age: 88
End: 2023-02-13
Payer: MEDICARE

## 2023-02-13 ENCOUNTER — APPOINTMENT (OUTPATIENT)
Dept: NEUROSURGERY | Facility: CLINIC | Age: 88
End: 2023-02-13

## 2023-02-13 ENCOUNTER — NON-APPOINTMENT (OUTPATIENT)
Age: 88
End: 2023-02-13

## 2023-02-13 VITALS
WEIGHT: 180 LBS | HEIGHT: 63 IN | BODY MASS INDEX: 31.89 KG/M2 | HEART RATE: 66 BPM | DIASTOLIC BLOOD PRESSURE: 72 MMHG | SYSTOLIC BLOOD PRESSURE: 108 MMHG | OXYGEN SATURATION: 97 %

## 2023-02-13 PROCEDURE — 99024 POSTOP FOLLOW-UP VISIT: CPT

## 2023-02-15 NOTE — ED ADULT TRIAGE NOTE - NS ED TRIAGE HISTORIAN
Appointment Confirmation (to confirm pre existing appointments - ONLY)  No need to route   Who is calling to confirm? [address] [place] [address] Avenue   If someone other than patient is calling, are they listed on the communication consent form?  N/A   Appointment with  JACINTA Seay   Appointment date & time  06/21/23 10AM   Appointment location [place]   Patient verbilized understanding Yes Patient

## 2023-02-21 NOTE — ASSESSMENT
[FreeTextEntry1] : 86 y/o M with multiple medical problems including Afib on Xarelto  s/p mechanical fall found to have  L2 fracture, and small associated L psoas hematoma s/p L1-L4 percutaneous fusion 12/31/22. Hospital course c/b fever,  CT chest 1/6/22 small b/l  pulmonary  effusions/ compressive atelectasis. Completed 5 days of empiric Zosyn postop. \par \par Patient brought in today by his son in law for postop follow up. He is doing well, incision has healed well, no signs of infection. Moving all extremities strong. He received PT 2 times per week at home. Has been getting out of bed with PT and can walk with walker at home.  x-ray completed on 2/10/23, showing intact L1-L4 level posterior spinal fusion hardware consisting of pedicle screws with interconnecting rods. No compression fractures. \par Can d/c brace, will obtain repeat x-ray in 6 weeks. He will continue PT at home. \par \par \par

## 2023-02-21 NOTE — HISTORY OF PRESENT ILLNESS
[FreeTextEntry1] : 87 year old male with PMHx of  dementia, Afib on Xarelto, BPH, HTN, HLD, DM2 s/p mechanical fall found to have  L2 fracture, and small associated L psoas hematoma. Patient underwent L1-L4 percutaneous fusion 12/31/22.\par Hospital course c/b fever,  CT chest 1/6/22 small b/l  pulmonary  effusions/ compressive atelectasis. Completed 5 days of empiric Zosyn postop. Evaluated by PT & recommended for KEENAN. Family wished to take him home with home PT and home care services.  \par \par Patient brought in today by his son in law for postop follow up. He is doing well, incision has healed well, no signs of infection. Moving all extremities strong. He received PT 2 times per week at home. x-ray with intact hardware. \par \par

## 2023-02-21 NOTE — ADDENDUM
[FreeTextEntry1] : I have personally seen , performed an exam and reviewed his imaging on PACS. The documentation reflects my findings and plan of care.\par \par I have spent 25 mins in this encounter.\par \par Crescencio Hector MD

## 2023-02-21 NOTE — PHYSICAL EXAM
[Longitudinal] : longitudinal [Clean] : clean [Well-Healed] : well-healed [Intact] : intact [No Drainage] : without drainage [Normal Skin] : normal [Cranial Nerves Oculomotor (III)] : extraocular motion intact [Cranial Nerves Optic (II)] : visual acuity intact bilaterally,  pupils equal round and reactive to light [Cranial Nerves Trigeminal (V)] : facial sensation intact symmetrically [Cranial Nerves Facial (VII)] : face symmetrical [Cranial Nerves Vestibulocochlear (VIII)] : hearing was intact bilaterally [Cranial Nerves Glossopharyngeal (IX)] : tongue and palate midline [Cranial Nerves Accessory (XI - Cranial And Spinal)] : head turning and shoulder shrug symmetric [Motor Strength] : muscle strength was normal in all four extremities [Motor Tone] : muscle tone was normal in all four extremities [Involuntary Movements] : no involuntary movements were seen [Sensation Tactile Decrease] : light touch was intact [Sclera] : the sclera and conjunctiva were normal [Extraocular Movements] : extraocular movements were intact [Outer Ear] : the ears and nose were normal in appearance [Hearing Threshold Finger Rub Not [place]] : hearing was normal [Neck Appearance] : the appearance of the neck was normal [] : no respiratory distress [Exaggerated Use Of Accessory Muscles For Inspiration] : no accessory muscle use [Skin Color & Pigmentation] : normal skin color and pigmentation [Erythema] : not erythematous [Warm] : not warm [Indurated] : not indurated

## 2023-03-22 PROBLEM — Z98.1 S/P LUMBAR FUSION: Status: ACTIVE | Noted: 2023-01-27

## 2023-03-23 NOTE — PATIENT PROFILE ADULT - HAVE YOU RECEIVED AT LEAST TWO PFIZER AND/OR MODERNA VACCINATIONS (IN ANY COMBINATION) AND/OR ONE JOHNSON & JOHNSON VACCINATION?
Chart reviewed for pt who is unable to complete Malnutrition Screen Tool (MST) at this time. No nutrition intervention appears indicated at this time. RD available via consult. Will follow per policy.     Yes

## 2023-03-24 NOTE — PHYSICAL THERAPY INITIAL EVALUATION ADULT - IMPAIRMENTS FOUND, PT EVAL
Stop taking your steroid prescription.  Take Benadryl as needed to treat symptoms.  Drink plenty of fluids.  Follow-up with your primary care provider if symptoms or not resolved by Monday.  Return to the ER for any other concerns issues that may arise.  
joint integrity and mobility/ROM/ergonomics and body mechanics/neuromotor development and sensory integration/arousal, attention, and cognition/aerobic capacity/endurance/gait, locomotion, and balance/muscle strength/gross motor

## 2023-03-27 ENCOUNTER — APPOINTMENT (OUTPATIENT)
Dept: SPINE | Facility: CLINIC | Age: 88
End: 2023-03-27

## 2023-03-27 DIAGNOSIS — Z98.1 ARTHRODESIS STATUS: ICD-10-CM

## 2023-03-27 NOTE — ASSESSMENT
[FreeTextEntry1] : 86 y/o M with multiple medical problems including Afib on Xarelto  s/p mechanical fall found to have  L2 fracture, and small associated L psoas hematoma s/p L1-L4 percutaneous fusion 12/31/22. Hospital course c/b fever,  CT chest 1/6/22 small b/l  pulmonary  effusions/compressive atelectasis. Completed 5 days of empiric Zosyn postop. \par \par Patient brought on 2/13/23 by his son in law for postop follow up. He has been doing well, incision healed well, no signs of infection. on exam moving all extremities strong. He has been getting PT 2 times per week at home. Has been getting out of bed with PT and can walk with walker at home. x-ray completed on 2/10/23, showing intact L1-L4 level posterior spinal fusion hardware. His brace was discontinued and repeat 6 week follow up x-ray ordered. Instructed to continue with PT at home. \par \par \par \par ****INCOMPLETE**** 3/22\par \par \par

## 2023-03-27 NOTE — HISTORY OF PRESENT ILLNESS
[FreeTextEntry1] : 87 year old male with PMHx of  dementia, Afib on Xarelto, BPH, HTN, HLD, DM2 s/p mechanical fall found to have  L2 fracture, and small associated L psoas hematoma. Patient underwent L1-L4 percutaneous fusion 12/31/22.\par Hospital course c/b fever,  CT chest 1/6/22 small b/l  pulmonary  effusions/ compressive atelectasis. Completed 5 days of empiric Zosyn postop. Evaluated by PT & recommended for KEENAN. Family wished to take him home with home PT and home care services.  \par \par Patient brought in on 2/13/23 by his son in law for postop follow up. He has been well, incision has healed well, no signs of infection. Moving all extremities strong. He received PT 2 times per week at home. x-ray with intact hardware. \par \par

## 2023-03-27 NOTE — PHYSICAL EXAM
[Longitudinal] : longitudinal [Clean] : clean [Well-Healed] : well-healed [Intact] : intact [No Drainage] : without drainage [Normal Skin] : normal [Cranial Nerves Optic (II)] : visual acuity intact bilaterally,  pupils equal round and reactive to light [Cranial Nerves Oculomotor (III)] : extraocular motion intact [Cranial Nerves Trigeminal (V)] : facial sensation intact symmetrically [Cranial Nerves Facial (VII)] : face symmetrical [Cranial Nerves Vestibulocochlear (VIII)] : hearing was intact bilaterally [Cranial Nerves Glossopharyngeal (IX)] : tongue and palate midline [Cranial Nerves Accessory (XI - Cranial And Spinal)] : head turning and shoulder shrug symmetric [Motor Tone] : muscle tone was normal in all four extremities [Motor Strength] : muscle strength was normal in all four extremities [Involuntary Movements] : no involuntary movements were seen [Sensation Tactile Decrease] : light touch was intact [Sclera] : the sclera and conjunctiva were normal [Extraocular Movements] : extraocular movements were intact [Outer Ear] : the ears and nose were normal in appearance [Hearing Threshold Finger Rub Not [place]] : hearing was normal [Neck Appearance] : the appearance of the neck was normal [] : no respiratory distress [Exaggerated Use Of Accessory Muscles For Inspiration] : no accessory muscle use [Skin Color & Pigmentation] : normal skin color and pigmentation [Erythema] : not erythematous [Warm] : not warm [Indurated] : not indurated

## 2023-05-29 NOTE — DISCHARGE NOTE PROVIDER - NSDCCAREPROVSEEN_GEN_ALL_CORE_FT
Crescencio Hector
pt states she was walking to the bathroom today when her walker slipped causing her to fall to the ground.  pt states she thinks she hit her head.  c/o nausea at this time.  Pt is on xarelto.  MD called for dallin in triage.

## 2023-06-29 NOTE — BRIEF OPERATIVE NOTE - NSICDXBRIEFPREOP_GEN_ALL_CORE_FT
PRE-OP DIAGNOSIS:  Traumatic burst fracture of lumbar vertebra 31-Dec-2022 15:26:34  Luis Alberto Christopher  
 used

## 2023-08-07 NOTE — ED ADULT NURSE NOTE - CHIEF COMPLAINT QUOTE
8/6/2023     Chief Complaint:  Right knee pain status post right total knee arthroplasty on 7/26/23    History of Present Illness:  Patient is approximately 2 weeks status post right total knee replacement with Dr. Shilpi Silva. Yesterday evening, patient began to complain of significant increasing right pain in the knee but more localized to the right calf. She had been ambulating as well as completing physical therapy and had been taking a aspirin for DVT prophylaxis and has not missed any doses. She did report as well mild chills in the evening time but no objective fevers. She has been able to ambulate and was able even to ambulate this morning walking around her room. Patient was initially seen Emergency Department her evening and eventually admitted to the medicine service right of the right knee for DVT versus possible postsurgical infection. She had been taking her pain medication without complication. No other complaints at this time. Medical History:  Past Medical History:   Diagnosis Date    Asthma     Chronic back pain     Histoplasmosis     Kidney disease     PT STATES LOSING PROTEIN, BEEN FINE FOR 25 YRS      Past Surgical History:   Procedure Laterality Date    BACK SURGERY      FUSED 4/23    BUNIONECTOMY      OTHER SURGICAL HISTORY      PT STATES 13 TOTAL SURGERIES, WON'T GO INTO DETAIL    SHOULDER SURGERY      3 ROTATOR CUFF SURGERIES    TOTAL KNEE ARTHROPLASTY Right 7/26/2023    RIGHT TOTAL KNEE REPLACEMENT performed by Jez Johnson MD at [address] [place] [address] [address] reviewed. No pertinent family history.    Social History     Socioeconomic History    Marital status:      Spouse name: Not on file    Number of children: Not on file    Years of education: Not on file    Highest education level: Not on file   Occupational History    Not on file   Tobacco Use    Smoking status: Former    Smokeless tobacco: Never   Substance and Sexual Activity    Alcohol use: Yes     Comment: 2-3 DRINKS A WEEK, Patient tripped on carpet at home today. Patient has bruise and swelling to right cheek. Denies LOC. Pmh htn, dm, dementia. Patient on Xarelto.

## 2023-08-14 NOTE — ED ADULT NURSE NOTE - NEURO ASSESSMENT
WDL How Severe Is Your Skin Lesion?: mild Have Your Skin Lesions Been Treated?: not been treated Is This A New Presentation, Or A Follow-Up?: Growths

## 2024-01-02 NOTE — SWALLOW BEDSIDE ASSESSMENT ADULT - SWALLOW EVAL: MANDIBULAR STRENGTH AND MOBILITY
normal appearance , without tenderness upon palpation , no deformities , trachea midline , Thyroid normal size , no masses , thyroid nontender intact

## 2024-03-15 NOTE — OCCUPATIONAL THERAPY INITIAL EVALUATION ADULT - PHYSICAL ASSIST/NONPHYSICAL ASSIST: SIT/STAND, REHAB EVAL
Called patient after they left .  Appt made for them would not work and they wanted to reschedule.  Offered another date and time and patient accepted.  No other questions were asked and they ended call.    SATHISH Jackson RN [facility] services  Pager no longer in use, please contact through [employer] group: [facility] [employer]    
verbal cues/2 person assist

## 2024-05-31 ENCOUNTER — INPATIENT (INPATIENT)
Facility: HOSPITAL | Age: 89
LOS: 5 days | Discharge: HOSPICE | End: 2024-06-06
Attending: INTERNAL MEDICINE | Admitting: INTERNAL MEDICINE
Payer: MEDICARE

## 2024-05-31 VITALS
RESPIRATION RATE: 35 BRPM | WEIGHT: 199.96 LBS | SYSTOLIC BLOOD PRESSURE: 129 MMHG | HEIGHT: 66 IN | OXYGEN SATURATION: 89 % | DIASTOLIC BLOOD PRESSURE: 84 MMHG | HEART RATE: 155 BPM

## 2024-05-31 LAB
ALBUMIN SERPL ELPH-MCNC: 2.4 G/DL — LOW (ref 3.3–5)
ALP SERPL-CCNC: 83 U/L — SIGNIFICANT CHANGE UP (ref 40–120)
ALT FLD-CCNC: 36 U/L — SIGNIFICANT CHANGE UP (ref 12–78)
ANION GAP SERPL CALC-SCNC: 9 MMOL/L — SIGNIFICANT CHANGE UP (ref 5–17)
APPEARANCE UR: ABNORMAL
APTT BLD: 29.8 SEC — SIGNIFICANT CHANGE UP (ref 24.5–35.6)
AST SERPL-CCNC: 57 U/L — HIGH (ref 15–37)
BASE EXCESS BLDV CALC-SCNC: 0.8 MMOL/L — SIGNIFICANT CHANGE UP (ref -2–3)
BASOPHILS # BLD AUTO: 0.02 K/UL — SIGNIFICANT CHANGE UP (ref 0–0.2)
BASOPHILS NFR BLD AUTO: 0.2 % — SIGNIFICANT CHANGE UP (ref 0–2)
BILIRUB SERPL-MCNC: 0.5 MG/DL — SIGNIFICANT CHANGE UP (ref 0.2–1.2)
BILIRUB UR-MCNC: NEGATIVE — SIGNIFICANT CHANGE UP
BUN SERPL-MCNC: 90 MG/DL — HIGH (ref 7–23)
CALCIUM SERPL-MCNC: 10 MG/DL — SIGNIFICANT CHANGE UP (ref 8.5–10.1)
CHLORIDE BLDV-SCNC: 119 MMOL/L — HIGH (ref 98–107)
CHLORIDE SERPL-SCNC: 122 MMOL/L — HIGH (ref 96–108)
CO2 BLDV-SCNC: 29 MMOL/L — HIGH (ref 22–26)
CO2 SERPL-SCNC: 26 MMOL/L — SIGNIFICANT CHANGE UP (ref 22–31)
COLOR SPEC: YELLOW — SIGNIFICANT CHANGE UP
CREAT SERPL-MCNC: 3.31 MG/DL — HIGH (ref 0.5–1.3)
DIFF PNL FLD: ABNORMAL
EGFR: 17 ML/MIN/1.73M2 — LOW
EOSINOPHIL # BLD AUTO: 0 K/UL — SIGNIFICANT CHANGE UP (ref 0–0.5)
EOSINOPHIL NFR BLD AUTO: 0 % — SIGNIFICANT CHANGE UP (ref 0–6)
GAS PNL BLDV: 158 MMOL/L — HIGH (ref 136–145)
GAS PNL BLDV: SIGNIFICANT CHANGE UP
GLUCOSE BLDC GLUCOMTR-MCNC: 260 MG/DL — HIGH (ref 70–99)
GLUCOSE BLDV-MCNC: 214 MG/DL — HIGH (ref 65–95)
GLUCOSE SERPL-MCNC: 204 MG/DL — HIGH (ref 70–99)
GLUCOSE UR QL: NEGATIVE MG/DL — SIGNIFICANT CHANGE UP
HCO3 BLDV-SCNC: 27 MMOL/L — SIGNIFICANT CHANGE UP (ref 22–28)
HCT VFR BLD CALC: 41 % — SIGNIFICANT CHANGE UP (ref 39–50)
HCT VFR BLDA CALC: 39 % — SIGNIFICANT CHANGE UP (ref 37–47)
HGB BLD CALC-MCNC: 13.1 G/DL — SIGNIFICANT CHANGE UP (ref 12.6–17.4)
HGB BLD-MCNC: 12.4 G/DL — LOW (ref 13–17)
IMM GRANULOCYTES NFR BLD AUTO: 0.5 % — SIGNIFICANT CHANGE UP (ref 0–0.9)
INR BLD: 1.33 RATIO — HIGH (ref 0.85–1.18)
KETONES UR-MCNC: NEGATIVE MG/DL — SIGNIFICANT CHANGE UP
LACTATE BLDV-MCNC: 6.7 MMOL/L — CRITICAL HIGH (ref 0.56–1.39)
LACTATE SERPL-SCNC: 6.7 MMOL/L — CRITICAL HIGH (ref 0.7–2)
LEUKOCYTE ESTERASE UR-ACNC: ABNORMAL
LYMPHOCYTES # BLD AUTO: 1.27 K/UL — SIGNIFICANT CHANGE UP (ref 1–3.3)
LYMPHOCYTES # BLD AUTO: 14.4 % — SIGNIFICANT CHANGE UP (ref 13–44)
MCHC RBC-ENTMCNC: 29 PG — SIGNIFICANT CHANGE UP (ref 27–34)
MCHC RBC-ENTMCNC: 30.2 G/DL — LOW (ref 32–36)
MCV RBC AUTO: 95.8 FL — SIGNIFICANT CHANGE UP (ref 80–100)
MONOCYTES # BLD AUTO: 0.85 K/UL — SIGNIFICANT CHANGE UP (ref 0–0.9)
MONOCYTES NFR BLD AUTO: 9.6 % — SIGNIFICANT CHANGE UP (ref 2–14)
NEUTROPHILS # BLD AUTO: 6.64 K/UL — SIGNIFICANT CHANGE UP (ref 1.8–7.4)
NEUTROPHILS NFR BLD AUTO: 75.3 % — SIGNIFICANT CHANGE UP (ref 43–77)
NITRITE UR-MCNC: NEGATIVE — SIGNIFICANT CHANGE UP
NRBC # BLD: 0 /100 WBCS — SIGNIFICANT CHANGE UP (ref 0–0)
NT-PROBNP SERPL-SCNC: 7496 PG/ML — HIGH (ref 0–450)
PCO2 BLDV: 49 MMHG — SIGNIFICANT CHANGE UP (ref 42–55)
PH BLDV: 7.35 — SIGNIFICANT CHANGE UP (ref 7.32–7.43)
PH UR: 5 — SIGNIFICANT CHANGE UP (ref 5–8)
PLATELET # BLD AUTO: 187 K/UL — SIGNIFICANT CHANGE UP (ref 150–400)
PO2 BLDV: 28 MMHG — SIGNIFICANT CHANGE UP (ref 25–45)
POTASSIUM BLDV-SCNC: 4.7 MMOL/L — SIGNIFICANT CHANGE UP (ref 3.5–5.1)
POTASSIUM SERPL-MCNC: 4.8 MMOL/L — SIGNIFICANT CHANGE UP (ref 3.5–5.3)
POTASSIUM SERPL-SCNC: 4.8 MMOL/L — SIGNIFICANT CHANGE UP (ref 3.5–5.3)
PROT SERPL-MCNC: 8.5 GM/DL — HIGH (ref 6–8.3)
PROT UR-MCNC: SIGNIFICANT CHANGE UP MG/DL
PROTHROM AB SERPL-ACNC: 15.8 SEC — HIGH (ref 9.5–13)
RAPID RVP RESULT: SIGNIFICANT CHANGE UP
RBC # BLD: 4.28 M/UL — SIGNIFICANT CHANGE UP (ref 4.2–5.8)
RBC # FLD: 15.2 % — HIGH (ref 10.3–14.5)
SAO2 % BLDV: 31.2 % — LOW (ref 94–98)
SARS-COV-2 RNA SPEC QL NAA+PROBE: SIGNIFICANT CHANGE UP
SODIUM SERPL-SCNC: 157 MMOL/L — HIGH (ref 135–145)
SP GR SPEC: 1.01 — SIGNIFICANT CHANGE UP (ref 1–1.03)
TROPONIN I, HIGH SENSITIVITY RESULT: 105.9 NG/L — HIGH
UROBILINOGEN FLD QL: 0.2 MG/DL — SIGNIFICANT CHANGE UP (ref 0.2–1)
WBC # BLD: 8.82 K/UL — SIGNIFICANT CHANGE UP (ref 3.8–10.5)
WBC # FLD AUTO: 8.82 K/UL — SIGNIFICANT CHANGE UP (ref 3.8–10.5)

## 2024-05-31 PROCEDURE — 70450 CT HEAD/BRAIN W/O DYE: CPT | Mod: 26,MC

## 2024-05-31 PROCEDURE — 93010 ELECTROCARDIOGRAM REPORT: CPT

## 2024-05-31 PROCEDURE — 71045 X-RAY EXAM CHEST 1 VIEW: CPT | Mod: 26,76

## 2024-05-31 PROCEDURE — 99291 CRITICAL CARE FIRST HOUR: CPT

## 2024-05-31 RX ORDER — INSULIN LISPRO 100/ML
VIAL (ML) SUBCUTANEOUS EVERY 6 HOURS
Refills: 0 | Status: DISCONTINUED | OUTPATIENT
Start: 2024-05-31 | End: 2024-06-01

## 2024-05-31 RX ORDER — CEFTRIAXONE 500 MG/1
1000 INJECTION, POWDER, FOR SOLUTION INTRAMUSCULAR; INTRAVENOUS ONCE
Refills: 0 | Status: DISCONTINUED | OUTPATIENT
Start: 2024-05-31 | End: 2024-05-31

## 2024-05-31 RX ORDER — DEXTROSE 50 % IN WATER 50 %
15 SYRINGE (ML) INTRAVENOUS ONCE
Refills: 0 | Status: DISCONTINUED | OUTPATIENT
Start: 2024-05-31 | End: 2024-06-01

## 2024-05-31 RX ORDER — DEXTROSE 50 % IN WATER 50 %
12.5 SYRINGE (ML) INTRAVENOUS ONCE
Refills: 0 | Status: DISCONTINUED | OUTPATIENT
Start: 2024-05-31 | End: 2024-06-01

## 2024-05-31 RX ORDER — CEFTRIAXONE 500 MG/1
1000 INJECTION, POWDER, FOR SOLUTION INTRAMUSCULAR; INTRAVENOUS EVERY 24 HOURS
Refills: 0 | Status: DISCONTINUED | OUTPATIENT
Start: 2024-05-31 | End: 2024-06-03

## 2024-05-31 RX ORDER — AZITHROMYCIN 500 MG/1
500 TABLET, FILM COATED ORAL ONCE
Refills: 0 | Status: COMPLETED | OUTPATIENT
Start: 2024-05-31 | End: 2024-05-31

## 2024-05-31 RX ORDER — HEPARIN SODIUM 5000 [USP'U]/ML
INJECTION INTRAVENOUS; SUBCUTANEOUS
Qty: 25000 | Refills: 0 | Status: DISCONTINUED | OUTPATIENT
Start: 2024-05-31 | End: 2024-06-01

## 2024-05-31 RX ORDER — CHLORHEXIDINE GLUCONATE 213 G/1000ML
1 SOLUTION TOPICAL
Refills: 0 | Status: DISCONTINUED | OUTPATIENT
Start: 2024-05-31 | End: 2024-06-03

## 2024-05-31 RX ORDER — SODIUM CHLORIDE 9 MG/ML
1000 INJECTION, SOLUTION INTRAVENOUS
Refills: 0 | Status: DISCONTINUED | OUTPATIENT
Start: 2024-05-31 | End: 2024-06-01

## 2024-05-31 RX ORDER — IPRATROPIUM/ALBUTEROL SULFATE 18-103MCG
3 AEROSOL WITH ADAPTER (GRAM) INHALATION EVERY 6 HOURS
Refills: 0 | Status: DISCONTINUED | OUTPATIENT
Start: 2024-05-31 | End: 2024-06-03

## 2024-05-31 RX ORDER — HEPARIN SODIUM 5000 [USP'U]/ML
7500 INJECTION INTRAVENOUS; SUBCUTANEOUS EVERY 6 HOURS
Refills: 0 | Status: DISCONTINUED | OUTPATIENT
Start: 2024-05-31 | End: 2024-06-01

## 2024-05-31 RX ORDER — NOREPINEPHRINE BITARTRATE/D5W 8 MG/250ML
0.05 PLASTIC BAG, INJECTION (ML) INTRAVENOUS
Qty: 8 | Refills: 0 | Status: DISCONTINUED | OUTPATIENT
Start: 2024-05-31 | End: 2024-06-03

## 2024-05-31 RX ORDER — GLUCAGON INJECTION, SOLUTION 0.5 MG/.1ML
1 INJECTION, SOLUTION SUBCUTANEOUS ONCE
Refills: 0 | Status: DISCONTINUED | OUTPATIENT
Start: 2024-05-31 | End: 2024-06-01

## 2024-05-31 RX ORDER — ACETAMINOPHEN 500 MG
1000 TABLET ORAL ONCE
Refills: 0 | Status: COMPLETED | OUTPATIENT
Start: 2024-05-31 | End: 2024-05-31

## 2024-05-31 RX ORDER — HEPARIN SODIUM 5000 [USP'U]/ML
3500 INJECTION INTRAVENOUS; SUBCUTANEOUS EVERY 6 HOURS
Refills: 0 | Status: DISCONTINUED | OUTPATIENT
Start: 2024-05-31 | End: 2024-06-01

## 2024-05-31 RX ORDER — DEXTROSE 50 % IN WATER 50 %
25 SYRINGE (ML) INTRAVENOUS ONCE
Refills: 0 | Status: DISCONTINUED | OUTPATIENT
Start: 2024-05-31 | End: 2024-06-01

## 2024-05-31 RX ORDER — DEXTROSE 10 % IN WATER 10 %
125 INTRAVENOUS SOLUTION INTRAVENOUS ONCE
Refills: 0 | Status: DISCONTINUED | OUTPATIENT
Start: 2024-05-31 | End: 2024-06-01

## 2024-05-31 RX ORDER — SODIUM CHLORIDE 9 MG/ML
1000 INJECTION, SOLUTION INTRAVENOUS ONCE
Refills: 0 | Status: COMPLETED | OUTPATIENT
Start: 2024-05-31 | End: 2024-05-31

## 2024-05-31 RX ORDER — SODIUM CHLORIDE 9 MG/ML
500 INJECTION, SOLUTION INTRAVENOUS ONCE
Refills: 0 | Status: COMPLETED | OUTPATIENT
Start: 2024-05-31 | End: 2024-05-31

## 2024-05-31 RX ADMIN — HEPARIN SODIUM 1700 UNIT(S)/HR: 5000 INJECTION INTRAVENOUS; SUBCUTANEOUS at 22:34

## 2024-05-31 RX ADMIN — AZITHROMYCIN 255 MILLIGRAM(S): 500 TABLET, FILM COATED ORAL at 20:57

## 2024-05-31 RX ADMIN — CEFTRIAXONE 100 MILLIGRAM(S): 500 INJECTION, POWDER, FOR SOLUTION INTRAMUSCULAR; INTRAVENOUS at 23:20

## 2024-05-31 RX ADMIN — Medication 3: at 23:38

## 2024-05-31 RX ADMIN — SODIUM CHLORIDE 75 MILLILITER(S): 9 INJECTION, SOLUTION INTRAVENOUS at 22:34

## 2024-05-31 RX ADMIN — SODIUM CHLORIDE 1000 MILLILITER(S): 9 INJECTION, SOLUTION INTRAVENOUS at 18:52

## 2024-05-31 RX ADMIN — SODIUM CHLORIDE 500 MILLILITER(S): 9 INJECTION, SOLUTION INTRAVENOUS at 23:38

## 2024-05-31 RX ADMIN — Medication 8.5 MICROGRAM(S)/KG/MIN: at 20:05

## 2024-05-31 RX ADMIN — Medication 400 MILLIGRAM(S): at 18:53

## 2024-05-31 RX ADMIN — CHLORHEXIDINE GLUCONATE 1 APPLICATION(S): 213 SOLUTION TOPICAL at 22:41

## 2024-05-31 NOTE — ED ADULT NURSE NOTE - ED STAT RN HANDOFF DETAILS
Report endorsed to oncoming RN Mev . Safety checks compld this shift/Safety rounds completed hourly.  IV sites checked Q2+remains WDL. Meds given as ord with no s/s of adverse RXNs. Fall +skin precs in place. Any issues endorsed to oncoming RN for follow up.

## 2024-05-31 NOTE — ED ADULT TRIAGE NOTE - CHIEF COMPLAINT QUOTE
BIBA for altered mental status x 3 days. Arrived on NRB 15L o2 sat 89%. HR 150s. Attending called to bedside.

## 2024-05-31 NOTE — ED PROVIDER NOTE - PHYSICAL EXAMINATION
Critically ill-appearing, well nourished, unresponsive, in mild resp. distress.    Airway patent. Neck supple.    Eyes without scleral injection. No jaundice.    Strong pulse. Tachycardia.    Respirations labored.    Abdomen soft, non-tender, no guarding.    MSK: Spine appears normal, range of motion is not limited, no muscle or joint tenderness. No LE edema.     Neuro: unresponsive    Skin: normal color for race, warm, dry and intact. No evidence of rash.    Psych: unable to ask questions of pt

## 2024-05-31 NOTE — H&P ADULT - NSHPSOCIALHISTORY_GEN_ALL_CORE
From home lives with wife and daughter/ROBYN, bed bound at baseline, minimally verbal, follows basic commands, is fed by family, eats, hx dysphagia.  ROBYN is primary care taker.

## 2024-05-31 NOTE — H&P ADULT - CRITICAL CARE ATTENDING COMMENT
Pt is an 88 yo WM with h/o CAD, A fib on Xarelto, HTN, HLD, DM2, PVD, CVD, BPH, Alzheimer's Dementia (bedbound, minimally verbal) and s/p fall with resultant  L psoas hematoma and  L2 burst fx L2 fx s/p L1-L4 percutaneous fusion. Pt BIBEMS worsening lethargy and decreased po intake. In the ER pt found to be febrile 104.9, tachycardic and, hypoxic with the following abnormal labs: Na 157 BUN/Cr 90/3.31 Trop 106 BNP 7496 lactic acid 6.7.  Pt given ~500 cc IVF 2 to ? h/o CHF. Pt became hypotensive and was started on Levophed. Also pt Rx'd with Azithromycin and Ceftriaxone. ICU dx: 1) Septic shock etiology unclear 2) A fib with RVR 3) Elevated trop 4) Dehydration/hypernatremia 5) ALBINO vs acute on CKD    Resp: Supplemental O2 prn to maintain O2 sat >92%   ID: PanCx/ Cont Ceftriaxone + Zithromax  CVS: Trend trop and check EKG/ BNP is elevated but CXR no sign pulm edema and pt has elevated Na/ Levophed to maintain MAP >65   Heme: +/- cont pt's Xarelto   FEN: NPO/ Fluid bolus prn increasing Levophed requirement  Endo: Follow Glu and cover with Lispro  Renal: Gentle hydration and follow BUN/Cr and UO  Neuro: Pt lethargic MS should improve as medical condition improves  Social: GOC initiated with family and clearly if pt was to decompensate they would not want MV    CCT: 45 min not in conjunction with the PA

## 2024-05-31 NOTE — H&P ADULT - NSHPPHYSICALEXAM_GEN_ALL_CORE
GENERAL: elderly male, lying in bed, nad  HEENT: nc/at  CV: korin  RESP: diminished b/l   ABD: soft, nontender, slightly distended over suprapubic area, +bs  : unremarkable, [place] cath and diaper in place (from home)  EXT: no edema  SKIN: warm  NEURO: lethargic

## 2024-05-31 NOTE — H&P ADULT - NSICDXPASTSURGICALHX_GEN_ALL_CORE_FT
PAST SURGICAL HISTORY:  Cataract extraction status of left eye     Larynx polyp Excision of larynx polyp; 2009     EMS

## 2024-05-31 NOTE — ED PROVIDER NOTE - CLINICAL SUMMARY MEDICAL DECISION MAKING FREE TEXT BOX
Nancy: Dementia, aspiration PNA, CHF, a. fib (not on anticoag). P/w 2 days decline in mental status and increase in HR. Family hearing rales/cough. Full Code. Likely sepsis from aspiration PNA. Check sepsis labs (UA, UCx, BCx, lactate, RVP). Give antipyretics (if needed). Recheck HR after antipyretics and IVF. Give IVF and ABx. CT brain. Admit.

## 2024-05-31 NOTE — H&P ADULT - HISTORY OF PRESENT ILLNESS
89 yo m pmhx Afib on Xarelto, Alzheimer's Dementia (bedbound, minimally verbal), HTN, HLD, CAD, DM2, CVA, BPH, PVD, fall s/p fx L2 fx s/p L1-L4 fusion biba from home with ams.  Per family patient has been eating less over the past few days, less verbal than his barely verbal baseline.  This am they tried to feed him an ensure however about an hour or so later it was coming from his mouth (?residual vs vomiting).  In the ED patient tachycardic to the 160s, hypoxic tot he 80s placed on NRB,  febrile to 104.9F, found to be dehydrated, with and elevated lactate, troponin and bun/cr.  Patient given ~500 cc IVF (secondary to concern for chf/afib--> overload) by ED team.  Patient became hypotensive and was started on levophed.  Patient given azithromycin, ceftriaxone, IV tylenol.  Admit to ICU.     Of note last dose of Xarelto was 5/30 am.

## 2024-05-31 NOTE — ED PROVIDER NOTE - OBJECTIVE STATEMENT
Nancy: Dementia, CVA, aspiration PNA, CHF, a. fib (not on anticoag). P/w 2 days decline in mental status and increase in HR. Family hearing rales/cough. Full Code.    Story from family, as pt. minimally-responsive.

## 2024-05-31 NOTE — ED PROVIDER NOTE - CRITICAL CARE ATTENDING CONTRIBUTION TO CARE
I performed a face-to-face evaluation of the patient and performed a history and physical examination. I agree with the history and physical examination. If this was a PA visit, I personally saw the patient with the PA and performed a substantive portion of the visit including all aspects of the medical decision making.    Dementia, aspiration PNA, CHF, a. fib (not on anticoag). P/w 2 days decline in mental status and increase in HR. Family hearing rales/cough. Full Code. Likely sepsis from aspiration PNA. Check sepsis labs (UA, UCx, BCx, lactate, RVP). Give antipyretics (if needed). Recheck HR after antipyretics and IVF. Give IVF and ABx. CT brain. Admit.    I have personally provided the amount of critical care time documented below, excluding time spent on separate procedures. I spoke with several family member(s). I read the EMS record or spoke with EMS. I performed a face-to-face evaluation of the patient and performed a history and physical examination. I agree with the history and physical examination. If this was a PA visit, I personally saw the patient with the PA and performed a substantive portion of the visit including all aspects of the medical decision making.    Dementia, aspiration PNA, CHF, a. fib (not on anticoag). P/w 2 days decline in mental status and increase in HR. Family hearing rales/cough. Full Code. Likely sepsis from aspiration PNA. Check sepsis labs (UA, UCx, BCx, lactate, RVP). Give antipyretics (if needed). Recheck HR after antipyretics and IVF. Give IVF and ABx. CT brain. Admit.    I have personally provided the amount of critical care time documented below, excluding time spent on separate procedures. I spoke with several family member(s). I read the EMS record or spoke with EMS. I spoke with the consulting service or read their note/recommendations.

## 2024-05-31 NOTE — PATIENT PROFILE ADULT - FUNCTIONAL ASSESSMENT - BASIC MOBILITY 6.
1-calculated by average/Not able to assess (calculate score using [facility] averaging method) details…

## 2024-05-31 NOTE — H&P ADULT - ASSESSMENT
88 yo m pmhx Afib on Xarelto, Alzheimer's Dementia (bedbound, minimally verbal), HTN, HLD, CAD, DM2, CVA, BPH, PVD, fall s/p fx L2 fx s/p L1-L4 fusion admitted with     1. Septic shock  2. Dehydration/Hypernatremia  3. ALBINO  4. AMS  5. Lactemia  6. Elevated Trop, likely demand    NEURO: Baseline dementia, resume meds when able to take PO.  HOB >30 degrees. aspiration precautions.  CV: actively titrating levophed for map >65, weaning as tolerated.  gentle hydration as tolerated  RESP: NC for spo2 >92%, neb prn  RENAL: avoid nephrotoxic meds, renally dose meds, trend urine output, bun /cr and electrolytes.  replace lytes as needed.  wilson for strict I&Os  GI: NPO  ENDO: poct q6hr while NPO   ID: empiric coverage.  cx sent  HEME: Hep gtt for ac until able to take po  DISPO: Spoke to family at bedside and via phone, they endorse they would likely not want CPR or a ventilator but would like to talk more as a family prior to official decision.      Critical Care time: 60 mins assessing presenting problems of acute illness that poses high probability of life threatening deterioration or end organ damage/dysfunction.  Medical decision making inculding Initiating plan of care, reviewing data, reviewing radiology, discussing with multidisciplinary team, non inclusive of procedures, discussing goals of care with patient/family    DATE OF DOCUMENTATION EQUIVALENT TO DATE OF SERVICES RENDERED

## 2024-05-31 NOTE — ED ADULT NURSE NOTE - OBJECTIVE STATEMENT
BIBIA-  as per son patient has declined for 3 days PMH Dementia / alzheimer's / Afib / water in lungs /CVA patient able to acknowledge son . patient has condom catheter on

## 2024-05-31 NOTE — ED ADULT TRIAGE NOTE - HEIGHT IN FEET
S: Patient doing well. Minimal lochia. Pain controlled.    O: Vital Signs Last 24 Hrs  T(C): 36.6 (2023 08:36), Max: 37 (2023 10:50)  T(F): 97.8 (2023 08:36), Max: 98.6 (2023 10:50)  HR: 93 (2023 08:36) (86 - 93)  BP: 110/76 (2023 08:36) (110/76 - 126/79)  BP(mean): --  RR: 18 (2023 08:36) (18 - 18)  SpO2: 97% (2023 08:36) (96% - 99%)    Parameters below as of 2023 08:36  Patient On (Oxygen Delivery Method): room air        Gen: NAD  Abd: soft, NT, ND, fundus firm below umbilicus  Lochia: normal  Ext: no tendernes      A: 35y PPD# 1s/p  doing well.    Plan: d-c home, full inst given, f-u in office 6 wks Postpartum Note- PPD#1    Allergies: No Known Allergies    Blood type: AB positive  Rubella: Immune  RPR: Negative      S: Patient is a 35y  PPD#1 s/p .   Patient w/o complaints, pain is controlled.    Pt is OOB, tolerating PO, passing flatus. Lochia WNL.     O:  Vital Signs Last 24 Hrs  T(C): 36.7 (2023 06:16), Max: 37 (2023 10:50)  T(F): 98 (2023 06:16), Max: 98.6 (2023 10:50)  HR: 86 (2023 06:16) (85 - 119)  BP: 126/79 (2023 06:16) (111/73 - 136/65)  BP(mean): --  RR: 18 (2023 06:16) (18 - 18)  SpO2: 97% (2023 06:16) (88% - 100%)    Parameters below as of 2023 06:16  Patient On (Oxygen Delivery Method): room air      Gen: NAD  Abdomen: Soft, nontender, non-distended, fundus firm.  Lochia WNL  Ext: Neg edema, Neg calf tenderness      A/P:  35y PPD#1 s/p , doing well.       PMHx: Denies  Current Issues: None    Increase OOB  Regular diet  PO Pain protocol  Continue routine post partum care    Alana Mary PA-C       5

## 2024-05-31 NOTE — ED ADULT NURSE NOTE - NSFALLRISKINTERV_ED_ALL_ED

## 2024-05-31 NOTE — PATIENT PROFILE ADULT - FALL HARM RISK - HARM RISK INTERVENTIONS

## 2024-06-01 LAB
ALBUMIN SERPL ELPH-MCNC: 2.1 G/DL — LOW (ref 3.3–5)
ALBUMIN SERPL ELPH-MCNC: 2.1 G/DL — LOW (ref 3.3–5)
ALP SERPL-CCNC: 72 U/L — SIGNIFICANT CHANGE UP (ref 40–120)
ALP SERPL-CCNC: 73 U/L — SIGNIFICANT CHANGE UP (ref 40–120)
ALT FLD-CCNC: 29 U/L — SIGNIFICANT CHANGE UP (ref 12–78)
ALT FLD-CCNC: 31 U/L — SIGNIFICANT CHANGE UP (ref 12–78)
ANION GAP SERPL CALC-SCNC: 8 MMOL/L — SIGNIFICANT CHANGE UP (ref 5–17)
ANION GAP SERPL CALC-SCNC: 8 MMOL/L — SIGNIFICANT CHANGE UP (ref 5–17)
APTT BLD: 71.5 SEC — HIGH (ref 24.5–35.6)
APTT BLD: 87.7 SEC — HIGH (ref 24.5–35.6)
AST SERPL-CCNC: 43 U/L — HIGH (ref 15–37)
AST SERPL-CCNC: 46 U/L — HIGH (ref 15–37)
BACTERIA # UR AUTO: ABNORMAL /HPF
BILIRUB SERPL-MCNC: 0.3 MG/DL — SIGNIFICANT CHANGE UP (ref 0.2–1.2)
BILIRUB SERPL-MCNC: 0.4 MG/DL — SIGNIFICANT CHANGE UP (ref 0.2–1.2)
BUN SERPL-MCNC: 84 MG/DL — HIGH (ref 7–23)
BUN SERPL-MCNC: 92 MG/DL — HIGH (ref 7–23)
CALCIUM SERPL-MCNC: 8.9 MG/DL — SIGNIFICANT CHANGE UP (ref 8.5–10.1)
CALCIUM SERPL-MCNC: 9.4 MG/DL — SIGNIFICANT CHANGE UP (ref 8.5–10.1)
CHLORIDE SERPL-SCNC: 119 MMOL/L — HIGH (ref 96–108)
CHLORIDE SERPL-SCNC: 120 MMOL/L — HIGH (ref 96–108)
CO2 SERPL-SCNC: 26 MMOL/L — SIGNIFICANT CHANGE UP (ref 22–31)
CO2 SERPL-SCNC: 27 MMOL/L — SIGNIFICANT CHANGE UP (ref 22–31)
CREAT SERPL-MCNC: 2.75 MG/DL — HIGH (ref 0.5–1.3)
CREAT SERPL-MCNC: 3.28 MG/DL — HIGH (ref 0.5–1.3)
EGFR: 17 ML/MIN/1.73M2 — LOW
EGFR: 22 ML/MIN/1.73M2 — LOW
EPI CELLS # UR: PRESENT
GLUCOSE BLDC GLUCOMTR-MCNC: 124 MG/DL — HIGH (ref 70–99)
GLUCOSE BLDC GLUCOMTR-MCNC: 127 MG/DL — HIGH (ref 70–99)
GLUCOSE BLDC GLUCOMTR-MCNC: 190 MG/DL — HIGH (ref 70–99)
GLUCOSE BLDC GLUCOMTR-MCNC: 226 MG/DL — HIGH (ref 70–99)
GLUCOSE BLDC GLUCOMTR-MCNC: 296 MG/DL — HIGH (ref 70–99)
GLUCOSE SERPL-MCNC: 296 MG/DL — HIGH (ref 70–99)
GLUCOSE SERPL-MCNC: 301 MG/DL — HIGH (ref 70–99)
HCT VFR BLD CALC: 35.8 % — LOW (ref 39–50)
HCT VFR BLD CALC: 37.6 % — LOW (ref 39–50)
HGB BLD-MCNC: 11 G/DL — LOW (ref 13–17)
HGB BLD-MCNC: 11.5 G/DL — LOW (ref 13–17)
LACTATE SERPL-SCNC: 3.2 MMOL/L — HIGH (ref 0.7–2)
LACTATE SERPL-SCNC: 5.9 MMOL/L — CRITICAL HIGH (ref 0.7–2)
LACTATE SERPL-SCNC: 7 MMOL/L — CRITICAL HIGH (ref 0.7–2)
MAGNESIUM SERPL-MCNC: 2.6 MG/DL — SIGNIFICANT CHANGE UP (ref 1.6–2.6)
MAGNESIUM SERPL-MCNC: 2.6 MG/DL — SIGNIFICANT CHANGE UP (ref 1.6–2.6)
MCHC RBC-ENTMCNC: 29.1 PG — SIGNIFICANT CHANGE UP (ref 27–34)
MCHC RBC-ENTMCNC: 29.2 PG — SIGNIFICANT CHANGE UP (ref 27–34)
MCHC RBC-ENTMCNC: 30.6 G/DL — LOW (ref 32–36)
MCHC RBC-ENTMCNC: 30.7 G/DL — LOW (ref 32–36)
MCV RBC AUTO: 95 FL — SIGNIFICANT CHANGE UP (ref 80–100)
MCV RBC AUTO: 95.2 FL — SIGNIFICANT CHANGE UP (ref 80–100)
MRSA PCR RESULT.: SIGNIFICANT CHANGE UP
NRBC # BLD: 0 /100 WBCS — SIGNIFICANT CHANGE UP (ref 0–0)
NRBC # BLD: 0 /100 WBCS — SIGNIFICANT CHANGE UP (ref 0–0)
PHOSPHATE SERPL-MCNC: 3.3 MG/DL — SIGNIFICANT CHANGE UP (ref 2.5–4.5)
PHOSPHATE SERPL-MCNC: 5 MG/DL — HIGH (ref 2.5–4.5)
PLATELET # BLD AUTO: 166 K/UL — SIGNIFICANT CHANGE UP (ref 150–400)
PLATELET # BLD AUTO: 170 K/UL — SIGNIFICANT CHANGE UP (ref 150–400)
POTASSIUM SERPL-MCNC: 3.8 MMOL/L — SIGNIFICANT CHANGE UP (ref 3.5–5.3)
POTASSIUM SERPL-MCNC: 4.5 MMOL/L — SIGNIFICANT CHANGE UP (ref 3.5–5.3)
POTASSIUM SERPL-SCNC: 3.8 MMOL/L — SIGNIFICANT CHANGE UP (ref 3.5–5.3)
POTASSIUM SERPL-SCNC: 4.5 MMOL/L — SIGNIFICANT CHANGE UP (ref 3.5–5.3)
PROT SERPL-MCNC: 7.3 GM/DL — SIGNIFICANT CHANGE UP (ref 6–8.3)
PROT SERPL-MCNC: 7.5 GM/DL — SIGNIFICANT CHANGE UP (ref 6–8.3)
RBC # BLD: 3.77 M/UL — LOW (ref 4.2–5.8)
RBC # BLD: 3.95 M/UL — LOW (ref 4.2–5.8)
RBC # FLD: 15 % — HIGH (ref 10.3–14.5)
RBC # FLD: 15 % — HIGH (ref 10.3–14.5)
RBC CASTS # UR COMP ASSIST: SIGNIFICANT CHANGE UP /HPF (ref 0–4)
S AUREUS DNA NOSE QL NAA+PROBE: SIGNIFICANT CHANGE UP
SODIUM SERPL-SCNC: 154 MMOL/L — HIGH (ref 135–145)
SODIUM SERPL-SCNC: 154 MMOL/L — HIGH (ref 135–145)
WBC # BLD: 11.76 K/UL — HIGH (ref 3.8–10.5)
WBC # BLD: 8.49 K/UL — SIGNIFICANT CHANGE UP (ref 3.8–10.5)
WBC # FLD AUTO: 11.76 K/UL — HIGH (ref 3.8–10.5)
WBC # FLD AUTO: 8.49 K/UL — SIGNIFICANT CHANGE UP (ref 3.8–10.5)
WBC UR QL: SIGNIFICANT CHANGE UP /HPF (ref 0–5)

## 2024-06-01 PROCEDURE — 76604 US EXAM CHEST: CPT | Mod: 26

## 2024-06-01 PROCEDURE — 93308 TTE F-UP OR LMTD: CPT | Mod: 26

## 2024-06-01 PROCEDURE — 99291 CRITICAL CARE FIRST HOUR: CPT | Mod: 25

## 2024-06-01 RX ORDER — PANTOPRAZOLE SODIUM 20 MG/1
40 TABLET, DELAYED RELEASE ORAL DAILY
Refills: 0 | Status: DISCONTINUED | OUTPATIENT
Start: 2024-06-01 | End: 2024-06-03

## 2024-06-01 RX ORDER — SODIUM CHLORIDE 9 MG/ML
500 INJECTION, SOLUTION INTRAVENOUS ONCE
Refills: 0 | Status: COMPLETED | OUTPATIENT
Start: 2024-06-01 | End: 2024-06-01

## 2024-06-01 RX ORDER — HEPARIN SODIUM 5000 [USP'U]/ML
5000 INJECTION INTRAVENOUS; SUBCUTANEOUS EVERY 6 HOURS
Refills: 0 | Status: DISCONTINUED | OUTPATIENT
Start: 2024-06-01 | End: 2024-06-03

## 2024-06-01 RX ORDER — HEPARIN SODIUM 5000 [USP'U]/ML
INJECTION INTRAVENOUS; SUBCUTANEOUS
Qty: 25000 | Refills: 0 | Status: DISCONTINUED | OUTPATIENT
Start: 2024-06-01 | End: 2024-06-03

## 2024-06-01 RX ORDER — AMIODARONE HYDROCHLORIDE 400 MG/1
150 TABLET ORAL ONCE
Refills: 0 | Status: COMPLETED | OUTPATIENT
Start: 2024-06-01 | End: 2024-06-01

## 2024-06-01 RX ORDER — AZITHROMYCIN 500 MG/1
500 TABLET, FILM COATED ORAL EVERY 24 HOURS
Refills: 0 | Status: DISCONTINUED | OUTPATIENT
Start: 2024-06-02 | End: 2024-06-03

## 2024-06-01 RX ORDER — INSULIN LISPRO 100/ML
VIAL (ML) SUBCUTANEOUS EVERY 4 HOURS
Refills: 0 | Status: DISCONTINUED | OUTPATIENT
Start: 2024-06-01 | End: 2024-06-02

## 2024-06-01 RX ORDER — AZITHROMYCIN 500 MG/1
TABLET, FILM COATED ORAL
Refills: 0 | Status: DISCONTINUED | OUTPATIENT
Start: 2024-06-01 | End: 2024-06-03

## 2024-06-01 RX ORDER — AMIODARONE HYDROCHLORIDE 400 MG/1
0.5 TABLET ORAL
Qty: 450 | Refills: 0 | Status: DISCONTINUED | OUTPATIENT
Start: 2024-06-02 | End: 2024-06-02

## 2024-06-01 RX ORDER — HEPARIN SODIUM 5000 [USP'U]/ML
2500 INJECTION INTRAVENOUS; SUBCUTANEOUS EVERY 6 HOURS
Refills: 0 | Status: DISCONTINUED | OUTPATIENT
Start: 2024-06-01 | End: 2024-06-03

## 2024-06-01 RX ORDER — AZITHROMYCIN 500 MG/1
500 TABLET, FILM COATED ORAL ONCE
Refills: 0 | Status: COMPLETED | OUTPATIENT
Start: 2024-06-01 | End: 2024-06-01

## 2024-06-01 RX ORDER — AMIODARONE HYDROCHLORIDE 400 MG/1
1 TABLET ORAL
Qty: 450 | Refills: 0 | Status: DISCONTINUED | OUTPATIENT
Start: 2024-06-01 | End: 2024-06-02

## 2024-06-01 RX ORDER — SODIUM CHLORIDE 9 MG/ML
1000 INJECTION, SOLUTION INTRAVENOUS
Refills: 0 | Status: DISCONTINUED | OUTPATIENT
Start: 2024-06-01 | End: 2024-06-03

## 2024-06-01 RX ADMIN — Medication 4: at 10:06

## 2024-06-01 RX ADMIN — Medication 2: at 13:27

## 2024-06-01 RX ADMIN — HEPARIN SODIUM 1100 UNIT(S)/HR: 5000 INJECTION INTRAVENOUS; SUBCUTANEOUS at 19:09

## 2024-06-01 RX ADMIN — PANTOPRAZOLE SODIUM 40 MILLIGRAM(S): 20 TABLET, DELAYED RELEASE ORAL at 11:49

## 2024-06-01 RX ADMIN — HEPARIN SODIUM 1100 UNIT(S)/HR: 5000 INJECTION INTRAVENOUS; SUBCUTANEOUS at 07:36

## 2024-06-01 RX ADMIN — SODIUM CHLORIDE 75 MILLILITER(S): 9 INJECTION, SOLUTION INTRAVENOUS at 07:35

## 2024-06-01 RX ADMIN — AZITHROMYCIN 255 MILLIGRAM(S): 500 TABLET, FILM COATED ORAL at 09:53

## 2024-06-01 RX ADMIN — Medication 3: at 05:28

## 2024-06-01 RX ADMIN — HEPARIN SODIUM 1100 UNIT(S)/HR: 5000 INJECTION INTRAVENOUS; SUBCUTANEOUS at 00:14

## 2024-06-01 RX ADMIN — HEPARIN SODIUM 1100 UNIT(S)/HR: 5000 INJECTION INTRAVENOUS; SUBCUTANEOUS at 12:55

## 2024-06-01 RX ADMIN — Medication 8.5 MICROGRAM(S)/KG/MIN: at 07:35

## 2024-06-01 RX ADMIN — SODIUM CHLORIDE 500 MILLILITER(S): 9 INJECTION, SOLUTION INTRAVENOUS at 00:34

## 2024-06-01 RX ADMIN — HEPARIN SODIUM 1100 UNIT(S)/HR: 5000 INJECTION INTRAVENOUS; SUBCUTANEOUS at 06:14

## 2024-06-01 RX ADMIN — HEPARIN SODIUM 1100 UNIT(S)/HR: 5000 INJECTION INTRAVENOUS; SUBCUTANEOUS at 07:15

## 2024-06-01 RX ADMIN — AMIODARONE HYDROCHLORIDE 618 MILLIGRAM(S): 400 TABLET ORAL at 16:30

## 2024-06-01 RX ADMIN — CEFTRIAXONE 100 MILLIGRAM(S): 500 INJECTION, POWDER, FOR SOLUTION INTRAMUSCULAR; INTRAVENOUS at 23:14

## 2024-06-01 RX ADMIN — AMIODARONE HYDROCHLORIDE 618 MILLIGRAM(S): 400 TABLET ORAL at 00:34

## 2024-06-01 RX ADMIN — SODIUM CHLORIDE 100 MILLILITER(S): 9 INJECTION, SOLUTION INTRAVENOUS at 13:00

## 2024-06-01 RX ADMIN — AMIODARONE HYDROCHLORIDE 33.3 MG/MIN: 400 TABLET ORAL at 22:05

## 2024-06-01 RX ADMIN — CHLORHEXIDINE GLUCONATE 1 APPLICATION(S): 213 SOLUTION TOPICAL at 05:28

## 2024-06-01 RX ADMIN — HEPARIN SODIUM 1100 UNIT(S)/HR: 5000 INJECTION INTRAVENOUS; SUBCUTANEOUS at 16:15

## 2024-06-01 NOTE — PROGRESS NOTE ADULT - SUBJECTIVE AND OBJECTIVE BOX
Progress Note    MARTHA CARL 89y (1935) Male [id number]  05-31-24 (1d)      Chief Complaint: Sepsis, Dehydration    Subjective:  Decreasing pressor requirements.    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  Unit(s)/Hr IV Continuous <Continuous>  insulin lispro (ADMELOG) corrective regimen sliding scale   SubCutaneous every 4 hours  lactated ringers. 1000 milliLiter(s) IV Continuous <Continuous>  norepinephrine Infusion 0.05 MICROgram(s)/kG/Min IV Continuous <Continuous>  pantoprazole  Injectable 40 milliGRAM(s) IV Push daily    Objective:  T(C): 36.1 (06-01-24 @ 12:00), Max: 40.5 (05-31-24 @ 18:58)  HR: 117 (06-01-24 @ 12:00) (104 - 155)  BP: 93/56 (06-01-24 @ 12:00) (68/41 - 138/84)  RR: 23 (06-01-24 @ 12:00) (10 - 35)  SpO2: 100% (06-01-24 @ 11:06) (66% - 100%)    Physical exam:  GENERAL: NAD, elderly, comfortable appearing, opens eyes but doesn't speak  HEAD:  Atraumatic, Normocephalic  EYES: EOMI, conjunctiva and sclera clear  NECK: Supple, No JVD  CHEST/LUNG: Clear to auscultation bilaterally; No wheeze  HEART: Regular rate and rhythm; No murmurs, rubs, or gallops  ABDOMEN: Soft, Nondistended; Bowel sounds present    05-31-24 @ 07:01  -  06-01-24 @ 07:00  --------------------------------------------------------  IN: 1456.8 mL / OUT: 790 mL / NET: 666.8 mL    06-01-24 @ 07:01  -  06-01-24 @ 12:30  --------------------------------------------------------  IN: 509.3 mL / OUT: 365 mL / NET: 144.3 mL        CAPILLARY BLOOD GLUCOSE      (06-01 @ 04:54)                      11.0  8.49 )-----------( 170                 35.8    Neutrophils = -- (--%)  Lymphocytes = -- (--%)  Eosinophils = -- (--%)  Basophils = -- (--%)  Monocytes = -- (--%)  Bands = --%    06-01    154<H>  |  120<H>  |  92<H>  ----------------------------<  296<H>  4.5   |  26  |  3.28<H>    Ca    9.4      01 Jun 2024 04:54  Phos  5.0     06-01  Mg     2.6     06-01    TPro  7.3  /  Alb  2.1<L>  /  TBili  0.4  /  DBili  x   /  AST  43<H>  /  ALT  29  /  AlkPhos  73  06-01    ( 31 May 2024 18:52 )   PT: 15.8 sec;   INR: 1.33 ratio;       PTT:71.5 sec      RVP:(05-31 @ 18:54)  [facility]      Venous Blood Gas:  05-31 @ 18:48  7.35/49/28/27/31.2  VBG Lactate: 6.70        Tox:         Urinalysis Basic - ( 01 Jun 2024 04:54 )    Color: x / Appearance: x / SG: x / pH: x  Gluc: 296 mg/dL / Ketone: x  / Bili: x / Urobili: x   Blood: x / Protein: x / Nitrite: x   Leuk Esterase: x / RBC: x / WBC x   Sq Epi: x / Non Sq Epi: x / Bacteria: x        WBC Trend: 8.49<--, 8.82<--    Hb Trend: 11.0<--, 12.4<--

## 2024-06-01 NOTE — CHART NOTE - NSCHARTNOTEFT_GEN_A_CORE
:  TAPAN [address]    Indication:  SHOCK    PROCEDURE:  [x ] LIMITED ECHO  [x ] LIMITED CHEST  [ ] LIMITED RETROPERITONEAL  [ ] LIMITED ABDOMINAL  [ ] LIMITED DVT  [ ] NEEDLE GUIDANCE VASCULAR  [ ] NEEDLE GUIDANCE THORACENTESIS  [ ] NEEDLE GUIDANCE PARACENTESIS  [ ] NEEDLE GUIDANCE PERICARDIOCENTESIS  [ ] OTHER    FINDINGS:  Chest: A-line predominant, normal aeration pattern bilat. small left sided simple effusion with dynamic air bronchograms     ECHO: LV>RV with grossly normal RV and LV systolic function with no wall motion abnormalities, nor septal bowing/flattening  No pericardial effusion  IVC: collapses with respirations       INTERPRETATION:  lung exam with small left sided effusion with dynamic air bronchograms c/f pna. not amenable to tap  grossly nl LV function. unlikely cardiogenic component to shock state  volume tolerant       images uploaded to [employer]

## 2024-06-02 ENCOUNTER — RESULT REVIEW (OUTPATIENT)
Age: 89
End: 2024-06-02

## 2024-06-02 LAB
A1C WITH ESTIMATED AVERAGE GLUCOSE RESULT: 7 % — HIGH (ref 4–5.6)
ALBUMIN SERPL ELPH-MCNC: 1.9 G/DL — LOW (ref 3.3–5)
ALP SERPL-CCNC: 64 U/L — SIGNIFICANT CHANGE UP (ref 40–120)
ALT FLD-CCNC: 25 U/L — SIGNIFICANT CHANGE UP (ref 12–78)
ANION GAP SERPL CALC-SCNC: 4 MMOL/L — LOW (ref 5–17)
APTT BLD: 61.9 SEC — HIGH (ref 24.5–35.6)
AST SERPL-CCNC: 41 U/L — HIGH (ref 15–37)
BASOPHILS # BLD AUTO: 0.01 K/UL — SIGNIFICANT CHANGE UP (ref 0–0.2)
BASOPHILS NFR BLD AUTO: 0.1 % — SIGNIFICANT CHANGE UP (ref 0–2)
BILIRUB SERPL-MCNC: 0.4 MG/DL — SIGNIFICANT CHANGE UP (ref 0.2–1.2)
BUN SERPL-MCNC: 63 MG/DL — HIGH (ref 7–23)
CALCIUM SERPL-MCNC: 8.8 MG/DL — SIGNIFICANT CHANGE UP (ref 8.5–10.1)
CHLORIDE SERPL-SCNC: 120 MMOL/L — HIGH (ref 96–108)
CO2 SERPL-SCNC: 28 MMOL/L — SIGNIFICANT CHANGE UP (ref 22–31)
CREAT SERPL-MCNC: 1.86 MG/DL — HIGH (ref 0.5–1.3)
EGFR: 34 ML/MIN/1.73M2 — LOW
EOSINOPHIL # BLD AUTO: 0.09 K/UL — SIGNIFICANT CHANGE UP (ref 0–0.5)
EOSINOPHIL NFR BLD AUTO: 1.1 % — SIGNIFICANT CHANGE UP (ref 0–6)
ESTIMATED AVERAGE GLUCOSE: 154 MG/DL — HIGH (ref 68–114)
GLUCOSE BLDC GLUCOMTR-MCNC: 120 MG/DL — HIGH (ref 70–99)
GLUCOSE BLDC GLUCOMTR-MCNC: 140 MG/DL — HIGH (ref 70–99)
GLUCOSE BLDC GLUCOMTR-MCNC: 152 MG/DL — HIGH (ref 70–99)
GLUCOSE BLDC GLUCOMTR-MCNC: 158 MG/DL — HIGH (ref 70–99)
GLUCOSE BLDC GLUCOMTR-MCNC: 167 MG/DL — HIGH (ref 70–99)
GLUCOSE SERPL-MCNC: 169 MG/DL — HIGH (ref 70–99)
HCT VFR BLD CALC: 31 % — LOW (ref 39–50)
HGB BLD-MCNC: 10.1 G/DL — LOW (ref 13–17)
IMM GRANULOCYTES NFR BLD AUTO: 0.4 % — SIGNIFICANT CHANGE UP (ref 0–0.9)
LACTATE SERPL-SCNC: 1.7 MMOL/L — SIGNIFICANT CHANGE UP (ref 0.7–2)
LYMPHOCYTES # BLD AUTO: 1.09 K/UL — SIGNIFICANT CHANGE UP (ref 1–3.3)
LYMPHOCYTES # BLD AUTO: 13.9 % — SIGNIFICANT CHANGE UP (ref 13–44)
MAGNESIUM SERPL-MCNC: 2.2 MG/DL — SIGNIFICANT CHANGE UP (ref 1.6–2.6)
MCHC RBC-ENTMCNC: 29.5 PG — SIGNIFICANT CHANGE UP (ref 27–34)
MCHC RBC-ENTMCNC: 32.6 G/DL — SIGNIFICANT CHANGE UP (ref 32–36)
MCV RBC AUTO: 90.6 FL — SIGNIFICANT CHANGE UP (ref 80–100)
MONOCYTES # BLD AUTO: 0.47 K/UL — SIGNIFICANT CHANGE UP (ref 0–0.9)
MONOCYTES NFR BLD AUTO: 6 % — SIGNIFICANT CHANGE UP (ref 2–14)
NEUTROPHILS # BLD AUTO: 6.14 K/UL — SIGNIFICANT CHANGE UP (ref 1.8–7.4)
NEUTROPHILS NFR BLD AUTO: 78.5 % — HIGH (ref 43–77)
NRBC # BLD: 0 /100 WBCS — SIGNIFICANT CHANGE UP (ref 0–0)
PHOSPHATE SERPL-MCNC: 2.8 MG/DL — SIGNIFICANT CHANGE UP (ref 2.5–4.5)
PLATELET # BLD AUTO: 163 K/UL — SIGNIFICANT CHANGE UP (ref 150–400)
POTASSIUM SERPL-MCNC: 3.3 MMOL/L — LOW (ref 3.5–5.3)
POTASSIUM SERPL-SCNC: 3.3 MMOL/L — LOW (ref 3.5–5.3)
PROCALCITONIN SERPL-MCNC: 4.53 NG/ML — HIGH (ref 0.02–0.1)
PROT SERPL-MCNC: 6.8 GM/DL — SIGNIFICANT CHANGE UP (ref 6–8.3)
RBC # BLD: 3.42 M/UL — LOW (ref 4.2–5.8)
RBC # FLD: 14.9 % — HIGH (ref 10.3–14.5)
SODIUM SERPL-SCNC: 152 MMOL/L — HIGH (ref 135–145)
WBC # BLD: 7.83 K/UL — SIGNIFICANT CHANGE UP (ref 3.8–10.5)
WBC # FLD AUTO: 7.83 K/UL — SIGNIFICANT CHANGE UP (ref 3.8–10.5)

## 2024-06-02 PROCEDURE — 99291 CRITICAL CARE FIRST HOUR: CPT

## 2024-06-02 PROCEDURE — 93306 TTE W/DOPPLER COMPLETE: CPT | Mod: 26

## 2024-06-02 PROCEDURE — 93356 MYOCRD STRAIN IMG SPCKL TRCK: CPT

## 2024-06-02 RX ORDER — INSULIN LISPRO 100/ML
VIAL (ML) SUBCUTANEOUS EVERY 6 HOURS
Refills: 0 | Status: DISCONTINUED | OUTPATIENT
Start: 2024-06-02 | End: 2024-06-03

## 2024-06-02 RX ORDER — POTASSIUM CHLORIDE 20 MEQ
10 PACKET (EA) ORAL
Refills: 0 | Status: COMPLETED | OUTPATIENT
Start: 2024-06-02 | End: 2024-06-02

## 2024-06-02 RX ORDER — AMIODARONE HYDROCHLORIDE 400 MG/1
0.5 TABLET ORAL
Qty: 450 | Refills: 0 | Status: DISCONTINUED | OUTPATIENT
Start: 2024-06-02 | End: 2024-06-03

## 2024-06-02 RX ADMIN — HEPARIN SODIUM 1100 UNIT(S)/HR: 5000 INJECTION INTRAVENOUS; SUBCUTANEOUS at 04:53

## 2024-06-02 RX ADMIN — CEFTRIAXONE 100 MILLIGRAM(S): 500 INJECTION, POWDER, FOR SOLUTION INTRAMUSCULAR; INTRAVENOUS at 23:25

## 2024-06-02 RX ADMIN — Medication 2: at 03:03

## 2024-06-02 RX ADMIN — AMIODARONE HYDROCHLORIDE 16.7 MG/MIN: 400 TABLET ORAL at 08:27

## 2024-06-02 RX ADMIN — AZITHROMYCIN 255 MILLIGRAM(S): 500 TABLET, FILM COATED ORAL at 09:44

## 2024-06-02 RX ADMIN — Medication 100 MILLIEQUIVALENT(S): at 06:01

## 2024-06-02 RX ADMIN — PANTOPRAZOLE SODIUM 40 MILLIGRAM(S): 20 TABLET, DELAYED RELEASE ORAL at 11:35

## 2024-06-02 RX ADMIN — Medication 100 MILLIEQUIVALENT(S): at 08:27

## 2024-06-02 RX ADMIN — Medication 100 MILLIEQUIVALENT(S): at 07:06

## 2024-06-02 RX ADMIN — Medication 8.5 MICROGRAM(S)/KG/MIN: at 08:27

## 2024-06-02 RX ADMIN — AMIODARONE HYDROCHLORIDE 16.7 MG/MIN: 400 TABLET ORAL at 20:24

## 2024-06-02 RX ADMIN — Medication 2: at 11:35

## 2024-06-02 RX ADMIN — HEPARIN SODIUM 1100 UNIT(S)/HR: 5000 INJECTION INTRAVENOUS; SUBCUTANEOUS at 19:06

## 2024-06-02 RX ADMIN — Medication 2: at 05:59

## 2024-06-02 RX ADMIN — SODIUM CHLORIDE 100 MILLILITER(S): 9 INJECTION, SOLUTION INTRAVENOUS at 18:14

## 2024-06-02 RX ADMIN — CHLORHEXIDINE GLUCONATE 1 APPLICATION(S): 213 SOLUTION TOPICAL at 05:58

## 2024-06-02 RX ADMIN — AMIODARONE HYDROCHLORIDE 16.7 MG/MIN: 400 TABLET ORAL at 04:02

## 2024-06-02 RX ADMIN — HEPARIN SODIUM 1100 UNIT(S)/HR: 5000 INJECTION INTRAVENOUS; SUBCUTANEOUS at 07:18

## 2024-06-02 RX ADMIN — HEPARIN SODIUM 1100 UNIT(S)/HR: 5000 INJECTION INTRAVENOUS; SUBCUTANEOUS at 13:30

## 2024-06-02 RX ADMIN — Medication 8.5 MICROGRAM(S)/KG/MIN: at 12:31

## 2024-06-02 NOTE — DIETITIAN INITIAL EVALUATION ADULT - ETIOLOGY
Inadequate protein-energy intake and increased protein needs in setting of pressure injuries, dementia

## 2024-06-02 NOTE — DIETITIAN INITIAL EVALUATION ADULT - OTHER INFO
Pt confused, no family at bedside. Information obtained from chart.   Pt with poor PO intake x "a few days" PTA as per H&P.   Pending swallow evaluation and palliative consult noted.   Pt with history of T2DM; Metformin PTA as per H&P. No recent HbA1c in chart.  Weight history as per [facility] [place]: (01/22/2024) 77.1kg. Weight loss as noted below.  RD remains available.

## 2024-06-02 NOTE — PROGRESS NOTE ADULT - SUBJECTIVE AND OBJECTIVE BOX
Progress Note    MARTHA CARL 89y (1935) Male [id number]  05-31-24 (2d)      Chief Complaint: Sepsis, Dehydration    Subjective:  Persistent a fib overnight. Started on amiodarone drip.    Review of Systems: Unable to obtain due to mental status.    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]    Obesity (BMI 30-39.9) [278.00]    Cataract extraction status of left eye [V45.61]    Larynx polyp [478.4]  Excision of larynx polyp; 2009      albuterol/ipratropium for Nebulization 3 milliLiter(s) Nebulizer every 6 hours PRN  aMIOdarone Infusion 1 mG/Min IV Continuous <Continuous>  aMIOdarone Infusion 0.5 mG/Min IV Continuous <Continuous>  azithromycin  IVPB 500 milliGRAM(s) IV Intermittent every 24 hours  azithromycin  IVPB      cefTRIAXone   IVPB 1000 milliGRAM(s) IV Intermittent every 24 hours  chlorhexidine 2% Cloths 1 Application(s) Topical <User Schedule>  heparin   Injectable 5000 Unit(s) IV Push every 6 hours PRN  heparin   Injectable 2500 Unit(s) IV Push every 6 hours PRN  heparin  Infusion.  Unit(s)/Hr IV Continuous <Continuous>  insulin lispro (ADMELOG) corrective regimen sliding scale   SubCutaneous every 6 hours  lactated ringers. 1000 milliLiter(s) IV Continuous <Continuous>  norepinephrine Infusion 0.05 MICROgram(s)/kG/Min IV Continuous <Continuous>  pantoprazole  Injectable 40 milliGRAM(s) IV Push daily    Objective:  T(C): 37.2 (06-02-24 @ 10:00), Max: 37.8 (06-01-24 @ 17:00)  HR: 91 (06-02-24 @ 10:00) (84 - 150)  BP: 122/85 (06-02-24 @ 10:00) (73/61 - 143/83)  RR: 21 (06-02-24 @ 10:00) (15 - 29)  SpO2: 100% (06-02-24 @ 10:00) (96% - 100%)    Physical exam:  GENERAL: NAD, elderly, comfortable appearing, opens eyes but doesn't speak.   HEAD:  Atraumatic, Normocephalic  EYES: EOMI, conjunctiva and sclera clear  NECK: Supple, No JVD  CHEST/LUNG: Clear to auscultation bilaterally; No wheeze  HEART: Irregular rhythm. Mildly tachycardic  ABDOMEN: Soft, Nondistended; Bowel sounds present        06-01-24 @ 07:01  -  06-02-24 @ 07:00  --------------------------------------------------------  IN: 3293 mL / OUT: 1776 mL / NET: 1517 mL    06-02-24 @ 07:01  -  06-02-24 @ 10:41  --------------------------------------------------------  IN: 386.7 mL / OUT: 190 mL / NET: 196.7 mL        CAPILLARY BLOOD GLUCOSE      (06-02 @ 03:45)                      10.1  7.83 )-----------( 163                 31.0    Neutrophils = 6.14 (78.5%)  Lymphocytes = 1.09 (13.9%)  Eosinophils = 0.09 (1.1%)  Basophils = 0.01 (0.1%)  Monocytes = 0.47 (6.0%)  Bands = --%    06-02    152<H>  |  120<H>  |  63<H>  ----------------------------<  169<H>  3.3<L>   |  28  |  1.86<H>    Ca    8.8      02 Jun 2024 03:45  Phos  2.8     06-02  Mg     2.2     06-02    TPro  6.8  /  Alb  1.9<L>  /  TBili  0.4  /  DBili  x   /  AST  41<H>  /  ALT  25  /  AlkPhos  64  06-02    ( 31 May 2024 18:52 )   PT: 15.8 sec;   INR: 1.33 ratio;       PTT:61.9 sec      RVP:(05-31 @ 18:54)  NotDetec    Urinalysis Basic - ( 02 Jun 2024 03:45 )    Color: x / Appearance: x / SG: x / pH: x  Gluc: 169 mg/dL / Ketone: x  / Bili: x / Urobili: x   Blood: x / Protein: x / Nitrite: x   Leuk Esterase: x / RBC: x / WBC x   Sq Epi: x / Non Sq Epi: x / Bacteria: x        WBC Trend: 7.83<--, 8.49<--, 8.82<--    Hb Trend: 10.1<--, 11.0<--, 12.4<--        New imaging in last 24 hours:  Consult notes reviewed:

## 2024-06-02 NOTE — DIETITIAN INITIAL EVALUATION ADULT - PERTINENT LABORATORY DATA
06-02    152<H>  |  120<H>  |  63<H>  ----------------------------<  169<H>  3.3<L>   |  28  |  1.86<H>    Ca    8.8      02 Jun 2024 03:45  Phos  2.8     06-02  Mg     2.2     06-02    TPro  6.8  /  Alb  1.9<L>  /  TBili  0.4  /  DBili  x   /  AST  41<H>  /  ALT  25  /  AlkPhos  64  06-02  CAPILLARY BLOOD GLUCOSE      POCT Blood Glucose.: 167 mg/dL (02 Jun 2024 05:57)  POCT Blood Glucose.: 158 mg/dL (02 Jun 2024 03:00)  POCT Blood Glucose.: 127 mg/dL (01 Jun 2024 21:05)  POCT Blood Glucose.: 124 mg/dL (01 Jun 2024 17:03)  POCT Blood Glucose.: 190 mg/dL (01 Jun 2024 13:23)

## 2024-06-02 NOTE — GOALS OF CARE CONVERSATION - ADVANCED CARE PLANNING - CONVERSATION DETAILS
Discussed GOC with patient's son in law and daughter separately. Son in law Mason is patient's primary caretaker. They both individually said that they would not want patient to undergo cardiopulmonary resuscitation with compressions, or a breathing tube. They also would not want him to undergo feeding tube placement. They are amenable to speaking to palliative care about home hospice.

## 2024-06-02 NOTE — DIETITIAN INITIAL EVALUATION ADULT - PERTINENT MEDS FT
MEDICATIONS  (STANDING):  aMIOdarone Infusion 1 mG/Min (33.3 mL/Hr) IV Continuous <Continuous>  aMIOdarone Infusion 0.5 mG/Min (16.7 mL/Hr) IV Continuous <Continuous>  azithromycin  IVPB 500 milliGRAM(s) IV Intermittent every 24 hours  azithromycin  IVPB      cefTRIAXone   IVPB 1000 milliGRAM(s) IV Intermittent every 24 hours  chlorhexidine 2% Cloths 1 Application(s) Topical <User Schedule>  heparin  Infusion.  Unit(s)/Hr (11 mL/Hr) IV Continuous <Continuous>  insulin lispro (ADMELOG) corrective regimen sliding scale   SubCutaneous every 6 hours  lactated ringers. 1000 milliLiter(s) (100 mL/Hr) IV Continuous <Continuous>  norepinephrine Infusion 0.05 MICROgram(s)/kG/Min (8.5 mL/Hr) IV Continuous <Continuous>  pantoprazole  Injectable 40 milliGRAM(s) IV Push daily    MEDICATIONS  (PRN):  albuterol/ipratropium for Nebulization 3 milliLiter(s) Nebulizer every 6 hours PRN Shortness of Breath and/or Wheezing  heparin   Injectable 5000 Unit(s) IV Push every 6 hours PRN For aPTT less than 40  heparin   Injectable 2500 Unit(s) IV Push every 6 hours PRN For aPTT between 40 - 57

## 2024-06-02 NOTE — DIETITIAN INITIAL EVALUATION ADULT - NSFNSPHYEXAMSKINFT_GEN_A_CORE
Pressure injuries as per flow sheets:  1. sacrum- stage I  2. right heel- suspected DTI  3. right buttocks- stage II

## 2024-06-02 NOTE — DIETITIAN INITIAL EVALUATION ADULT - DIET TYPE
If PO diet to be initiated recommend consistent carbohydrate, low sodium + Glucerna x 2/day (provides 440 kcal, 20 g protein); texture/consistency as per SLP recommendations

## 2024-06-03 DIAGNOSIS — R53.81 OTHER MALAISE: ICD-10-CM

## 2024-06-03 DIAGNOSIS — E44.0 MODERATE PROTEIN-CALORIE MALNUTRITION: ICD-10-CM

## 2024-06-03 DIAGNOSIS — A41.9 SEPSIS, UNSPECIFIED ORGANISM: ICD-10-CM

## 2024-06-03 DIAGNOSIS — I48.91 UNSPECIFIED ATRIAL FIBRILLATION: ICD-10-CM

## 2024-06-03 DIAGNOSIS — Z51.5 ENCOUNTER FOR PALLIATIVE CARE: ICD-10-CM

## 2024-06-03 DIAGNOSIS — G30.9 ALZHEIMER'S DISEASE, UNSPECIFIED: ICD-10-CM

## 2024-06-03 LAB
ANION GAP SERPL CALC-SCNC: 4 MMOL/L — LOW (ref 5–17)
APTT BLD: 66.9 SEC — HIGH (ref 24.5–35.6)
BUN SERPL-MCNC: 41 MG/DL — HIGH (ref 7–23)
CALCIUM SERPL-MCNC: 8.8 MG/DL — SIGNIFICANT CHANGE UP (ref 8.5–10.1)
CHLORIDE SERPL-SCNC: 118 MMOL/L — HIGH (ref 96–108)
CO2 SERPL-SCNC: 27 MMOL/L — SIGNIFICANT CHANGE UP (ref 22–31)
CREAT SERPL-MCNC: 1.29 MG/DL — SIGNIFICANT CHANGE UP (ref 0.5–1.3)
EGFR: 53 ML/MIN/1.73M2 — LOW
GLUCOSE BLDC GLUCOMTR-MCNC: 116 MG/DL — HIGH (ref 70–99)
GLUCOSE BLDC GLUCOMTR-MCNC: 131 MG/DL — HIGH (ref 70–99)
GLUCOSE BLDC GLUCOMTR-MCNC: 151 MG/DL — HIGH (ref 70–99)
GLUCOSE BLDC GLUCOMTR-MCNC: 164 MG/DL — HIGH (ref 70–99)
GLUCOSE SERPL-MCNC: 147 MG/DL — HIGH (ref 70–99)
HCT VFR BLD CALC: 29.4 % — LOW (ref 39–50)
HGB BLD-MCNC: 9.6 G/DL — LOW (ref 13–17)
LEGIONELLA AG UR QL: NEGATIVE — SIGNIFICANT CHANGE UP
MAGNESIUM SERPL-MCNC: 1.8 MG/DL — SIGNIFICANT CHANGE UP (ref 1.6–2.6)
MCHC RBC-ENTMCNC: 29.7 PG — SIGNIFICANT CHANGE UP (ref 27–34)
MCHC RBC-ENTMCNC: 32.7 G/DL — SIGNIFICANT CHANGE UP (ref 32–36)
MCV RBC AUTO: 91 FL — SIGNIFICANT CHANGE UP (ref 80–100)
NRBC # BLD: 0 /100 WBCS — SIGNIFICANT CHANGE UP (ref 0–0)
PHOSPHATE SERPL-MCNC: 2.2 MG/DL — LOW (ref 2.5–4.5)
PLATELET # BLD AUTO: 140 K/UL — LOW (ref 150–400)
POTASSIUM SERPL-MCNC: 3.9 MMOL/L — SIGNIFICANT CHANGE UP (ref 3.5–5.3)
POTASSIUM SERPL-SCNC: 3.9 MMOL/L — SIGNIFICANT CHANGE UP (ref 3.5–5.3)
RBC # BLD: 3.23 M/UL — LOW (ref 4.2–5.8)
RBC # FLD: 14.7 % — HIGH (ref 10.3–14.5)
SODIUM SERPL-SCNC: 149 MMOL/L — HIGH (ref 135–145)
WBC # BLD: 8.46 K/UL — SIGNIFICANT CHANGE UP (ref 3.8–10.5)
WBC # FLD AUTO: 8.46 K/UL — SIGNIFICANT CHANGE UP (ref 3.8–10.5)

## 2024-06-03 PROCEDURE — 99291 CRITICAL CARE FIRST HOUR: CPT

## 2024-06-03 PROCEDURE — 99223 1ST HOSP IP/OBS HIGH 75: CPT

## 2024-06-03 RX ORDER — SODIUM CHLORIDE 9 MG/ML
1000 INJECTION, SOLUTION INTRAVENOUS
Refills: 0 | Status: DISCONTINUED | OUTPATIENT
Start: 2024-06-03 | End: 2024-06-04

## 2024-06-03 RX ORDER — ACETAMINOPHEN 500 MG
1000 TABLET ORAL ONCE
Refills: 0 | Status: COMPLETED | OUTPATIENT
Start: 2024-06-03 | End: 2024-06-03

## 2024-06-03 RX ORDER — RIVAROXABAN 15 MG-20MG
20 KIT ORAL
Refills: 0 | Status: DISCONTINUED | OUTPATIENT
Start: 2024-06-03 | End: 2024-06-06

## 2024-06-03 RX ORDER — MIDODRINE HYDROCHLORIDE 2.5 MG/1
20 TABLET ORAL EVERY 8 HOURS
Refills: 0 | Status: DISCONTINUED | OUTPATIENT
Start: 2024-06-03 | End: 2024-06-06

## 2024-06-03 RX ORDER — SODIUM CHLORIDE 9 MG/ML
1000 INJECTION, SOLUTION INTRAVENOUS
Refills: 0 | Status: DISCONTINUED | OUTPATIENT
Start: 2024-06-03 | End: 2024-06-03

## 2024-06-03 RX ORDER — POTASSIUM PHOSPHATE, MONOBASIC POTASSIUM PHOSPHATE, DIBASIC 236; 224 MG/ML; MG/ML
15 INJECTION, SOLUTION INTRAVENOUS ONCE
Refills: 0 | Status: COMPLETED | OUTPATIENT
Start: 2024-06-03 | End: 2024-06-03

## 2024-06-03 RX ORDER — CEFTRIAXONE 500 MG/1
1000 INJECTION, POWDER, FOR SOLUTION INTRAMUSCULAR; INTRAVENOUS EVERY 24 HOURS
Refills: 0 | Status: COMPLETED | OUTPATIENT
Start: 2024-06-03 | End: 2024-06-05

## 2024-06-03 RX ORDER — CHLORHEXIDINE GLUCONATE 213 G/1000ML
1 SOLUTION TOPICAL
Refills: 0 | Status: DISCONTINUED | OUTPATIENT
Start: 2024-06-03 | End: 2024-06-06

## 2024-06-03 RX ORDER — INSULIN LISPRO 100/ML
VIAL (ML) SUBCUTANEOUS EVERY 6 HOURS
Refills: 0 | Status: DISCONTINUED | OUTPATIENT
Start: 2024-06-03 | End: 2024-06-04

## 2024-06-03 RX ORDER — MAGNESIUM SULFATE 500 MG/ML
1 VIAL (ML) INJECTION ONCE
Refills: 0 | Status: COMPLETED | OUTPATIENT
Start: 2024-06-03 | End: 2024-06-03

## 2024-06-03 RX ADMIN — CEFTRIAXONE 100 MILLIGRAM(S): 500 INJECTION, POWDER, FOR SOLUTION INTRAMUSCULAR; INTRAVENOUS at 21:17

## 2024-06-03 RX ADMIN — Medication 2: at 05:57

## 2024-06-03 RX ADMIN — MIDODRINE HYDROCHLORIDE 20 MILLIGRAM(S): 2.5 TABLET ORAL at 21:18

## 2024-06-03 RX ADMIN — HEPARIN SODIUM 1100 UNIT(S)/HR: 5000 INJECTION INTRAVENOUS; SUBCUTANEOUS at 03:17

## 2024-06-03 RX ADMIN — HEPARIN SODIUM 1100 UNIT(S)/HR: 5000 INJECTION INTRAVENOUS; SUBCUTANEOUS at 07:19

## 2024-06-03 RX ADMIN — Medication 1000 MILLIGRAM(S): at 14:41

## 2024-06-03 RX ADMIN — Medication 100 GRAM(S): at 05:22

## 2024-06-03 RX ADMIN — Medication 2: at 17:50

## 2024-06-03 RX ADMIN — Medication 400 MILLIGRAM(S): at 13:38

## 2024-06-03 RX ADMIN — SODIUM CHLORIDE 100 MILLILITER(S): 9 INJECTION, SOLUTION INTRAVENOUS at 20:09

## 2024-06-03 RX ADMIN — MIDODRINE HYDROCHLORIDE 20 MILLIGRAM(S): 2.5 TABLET ORAL at 14:28

## 2024-06-03 RX ADMIN — SODIUM CHLORIDE 100 MILLILITER(S): 9 INJECTION, SOLUTION INTRAVENOUS at 18:02

## 2024-06-03 RX ADMIN — SODIUM CHLORIDE 100 MILLILITER(S): 9 INJECTION, SOLUTION INTRAVENOUS at 06:49

## 2024-06-03 RX ADMIN — POTASSIUM PHOSPHATE, MONOBASIC POTASSIUM PHOSPHATE, DIBASIC 62.5 MILLIMOLE(S): 236; 224 INJECTION, SOLUTION INTRAVENOUS at 07:19

## 2024-06-03 RX ADMIN — CHLORHEXIDINE GLUCONATE 1 APPLICATION(S): 213 SOLUTION TOPICAL at 05:22

## 2024-06-03 RX ADMIN — RIVAROXABAN 20 MILLIGRAM(S): KIT at 17:50

## 2024-06-03 NOTE — SWALLOW BEDSIDE ASSESSMENT ADULT - COMMENTS
pt seen bedside sitting upright on RA and alert. pt pleasantly confused, non verbal essentially noncommunicative except ie "smile" and he did not follow directions for oral [facility] exam. sp02 sat trending 96-98. son present     CXR 5/31/2024, 8:33 PM. AP view of the chest. Left lower lung zone ill-defined opacity and moderate pleural effusion. The right lung is clear. Cardiac and mediastinal silhouette and bones are unremarkable. IMPRESSION: Left lower lung zone ill-defined opacity and moderate pleural effusion.    CT head 5/31/2024 IMPRESSION: 1.  No acute intracranial abnormality. 2.  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 3.  Mild chronic microvascular ischemic disease. 4. Moderate diffuse cerebral volume loss. pt seen bedside sitting upright on RA and alert. pt pleasantly confused, non verbal essentially noncommunicative except ie "smile" and he did not follow directions for oral [facility] exam. sp02 sat trending 96-98. son in law present     CXR 5/31/2024, 8:33 PM. AP view of the chest. Left lower lung zone ill-defined opacity and moderate pleural effusion. The right lung is clear. Cardiac and mediastinal silhouette and bones are unremarkable. IMPRESSION: Left lower lung zone ill-defined opacity and moderate pleural effusion.    CT head 5/31/2024 IMPRESSION: 1.  No acute intracranial abnormality. 2.  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 3.  Mild chronic microvascular ischemic disease. 4. Moderate diffuse cerebral volume loss.

## 2024-06-03 NOTE — PROGRESS NOTE ADULT - SUBJECTIVE AND OBJECTIVE BOX
Progress Note    MARTHA CARL 89y (1935) Male [id number]  05-31-24 (3d)      Chief Complaint: OTHER SEPSIS        Subjective:  No events overnight. Off pressors.    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 1000 milliLiter(s) IV Continuous <Continuous>  rivaroxaban 20 milliGRAM(s) Oral with dinner    Objective:  T(C): 38.1 (06-03-24 @ 11:00), Max: 38.1 (06-03-24 @ 10:30)  HR: 90 (06-03-24 @ 11:00) (82 - 112)  BP: 88/47 (06-03-24 @ 11:00) (32/98 - 146/100)  RR: 24 (06-03-24 @ 11:00) (15 - 27)  SpO2: 100% (06-03-24 @ 11:00) (96% - 100%)    Physical exam:  GENERAL: NAD, elderly, comfortable appearing, opens eyes but doesn't speak.   HEAD:  Atraumatic, Normocephalic  EYES: EOMI, conjunctiva and sclera clear  NECK: Supple, No JVD  CHEST/LUNG: Clear to auscultation bilaterally; No wheeze  HEART: Irregular rhythm. Mildly tachycardic  ABDOMEN: Soft, Nondistended; Bowel sounds present      06-02-24 @ 07:01  -  06-03-24 @ 07:00  --------------------------------------------------------  IN: 3228.2 mL / OUT: 1525 mL / NET: 1703.2 mL        CAPILLARY BLOOD GLUCOSE      (06-03 @ 02:50)                      9.6  8.46 )-----------( 140                 29.4    Neutrophils = -- (--%)  Lymphocytes = -- (--%)  Eosinophils = -- (--%)  Basophils = -- (--%)  Monocytes = -- (--%)  Bands = --%    06-03    149<H>  |  118<H>  |  41<H>  ----------------------------<  147<H>  3.9   |  27  |  1.29    Ca    8.8      03 Jun 2024 02:50  Phos  2.2     06-03  Mg     1.8     06-03    TPro  6.8  /  Alb  1.9<L>  /  TBili  0.4  /  DBili  x   /  AST  41<H>  /  ALT  25  /  AlkPhos  64  06-02    PTT:66.9 sec      RVP:(05-31 @ 18:54)  NotDetec    Urinalysis Basic - ( 03 Jun 2024 02:50 )    Color: x / Appearance: x / SG: x / pH: x  Gluc: 147 mg/dL / Ketone: x  / Bili: x / Urobili: x   Blood: x / Protein: x / Nitrite: x   Leuk Esterase: x / RBC: x / WBC x   Sq Epi: x / Non Sq Epi: x / Bacteria: x    WBC Trend: 8.46<--, 7.83<--, 8.49<--    Hb Trend: 9.6<--, 10.1<--, 11.0<--, 12.4<--

## 2024-06-03 NOTE — CONSULT NOTE ADULT - PROBLEM SELECTOR RECOMMENDATION 2
required Amiodarone infusion overnight, now d/c with rate 90"S  continue Heparin drip for AC, convert to po when able

## 2024-06-03 NOTE — CONSULT NOTE ADULT - NS ATTEND AMEND GEN_ALL_CORE FT
89 Y m w multiple comorbidities including Afib on Xarelto, advanced Alzheimer's Dementia, fall s/p fx L2 fx s/p L1-L4 fusion admitted  to the ICU with septic shock, treated for E coli UTI. Min verbal, dependent for care, bedbound at baseline. Wife defers to daughter and son in law for decisionmaking. MOLST on file for DNR/DNI. GOC discussion as above. Pt is hospice eligible depending on goals. Palliative will follow.

## 2024-06-03 NOTE — SWALLOW BEDSIDE ASSESSMENT ADULT - SWALLOW EVAL: DIAGNOSIS
pt presented with oropharyngeal dysphagia for puree/ moderate thick liquid. oral phase marked by reduced labial seal/stripping utensil, adequate oral containment, slightly prolonged but efficient bolus manipulation and transport posterior. +initiation of pharyngeal swallow trigger and hyolaryngeal elevation to palpation.

## 2024-06-03 NOTE — CONSULT NOTE ADULT - ASSESSMENT
88 yo m  with hx Afib on Xarelto, Alzheimer's Dementia (minimally verbal), HTN, HLD, CAD, DM2, CVA, BPH, PVD, fall s/p fx L2 fx s/p L1-L4 fusion admitted  to the ICU with. Septic shock   89 yo m  with hx Afib on Xarelto, Alzheimer's Dementia (minimally verbal), HTN, HLD, CAD, DM2, CVA, BPH, PVD, fall s/p fx L2 fx s/p L1-L4 fusion admitted  to the ICU with.s eptic shock

## 2024-06-03 NOTE — SWALLOW BEDSIDE ASSESSMENT ADULT - SLP PERTINENT HISTORY OF CURRENT PROBLEM
c/o eating less over the past few days, less verbal. pt admitted to MICU for Septic shock etiology unclear, A fib with RVR, Elevated trop Dehydration/hypernatremia, and ALBINO vs acute on CKD

## 2024-06-03 NOTE — CONSULT NOTE ADULT - CONVERSATION DETAILS
Met with son Frank Pena at pt bedside  Introduced service and  Palliative medicines  team's role in assisting w complex decision making, communication and support.  Discussed her current clinical condition and further goals of care.     Son is here visiting from [place]. Pt was born in [facility], [place], came [place] in 1960[place]. , 4 children. Wife is presently also hospitalized in this hospital. Pt is a retired government , [employer].     Son states that his brother in law Mason Vann is pt caregiver, Pt wife is pt medical decision maker, but has been deferring decision making to pt daughter and  Mason's wife Alma Vann    Contact info:  Alma Vann (daughter) : (C): [phone] (C): [phone] (call first)   Michaelle Pena (wife): [phone]  Mason Vann (son-in law) : [phone]  Natalya Pena (daughter) :[phone]  Frank Pena Jr (son): [phone]  Oli (Sd) Becky (son): [phone]    Pt is living at home with Advance Alzheimer's disease Met with son Frank Pena at pt bedside  Introduced service and  Palliative medicines  team's role in assisting w complex decision making, communication and support.  Discussed her current clinical condition and further goals of care.     Son is here visiting from [place]. Pt was born in [facility], [place], [place] in [place]. , 4 children. Wife is presently also hospitalized in this hospital. Pt is a retired government , [employer].     Son states that pt lives with his wife, daughter Alma and ROBYN Cuevas and their adult son. Mason Vann is pt CDPAP aide 9.5 hrs , Pt wife is pt medical decision maker, but has been deferring decision making to pt daughter and  Mason's wife Alma Vann as she is getting older.    Contact info:  Alma Vann (daughter) : (C): [phone] (C): [phone] (call first)   Michaelle Pena (wife): [phone]  Mason Vann (son-in law) : [phone]  Natalya Becky (daughter) :[phone]  Frank Ramirezo  (son): [phone]  Oli (Sd) Becky (son): [phone]    Spoke with Mason Vann via T/C as he is unable to be present in the hospital at this time. Per Mr Vann, pt  was dx with  Alzheimer's disease dx ~2017. At baseline pt is OOB to chair daily, questionable ability to weight bear. He is placed in the shower on a shower chair a few times weekly. He is fully dependant on family for all other ADL's. Pt  is generally NV and not usually able to make his needs known to his family    Mr Vann states pt with declining appetite over the past 3 months, managed on a puree diet with thickened liquids with signs of recent dysphagia. Pt as begun to pool his food in his cheeks. He reports pt with multiple falls over the past year with varying degrees of injury.     Discussion with family in ICU yielded decisions for DNR/DNI. Continued discussion today. Mr Vann states that family wishes for pt care to be focused on comfort and quality of life.  Family wishes to focus on good symptom management, does nt wish for artifical nutrition, placement of PGT. Pt has been under the care of Dr Yeison Victor for the past 20+ years. Mr Edwar verbalizes that MD has talked of recent decline with worsening cardiac and pulmonary status, family is aware that medical decline is not reversible. He acknowledges that life expectancy is not likely measured in years but more likely measured in months and "just wants him to be comfortable"     Family was made aware  that pt meets Medicare criteria and is eligible to receive hospice services. Explained hospice as a Medicare benefit designed to assist families with an additional layer of care with the goal of providing best symptom management and QOL at home for the duration of ones life. Reviewed hospice philosophy and services.     He wishes to discuss with his wife and family as well as pt PMD later today. Asks if this NP can meet with family at pt bedside in AM to further discuss.     ICU team aware

## 2024-06-03 NOTE — CONSULT NOTE ADULT - SUBJECTIVE AND OBJECTIVE BOX
MARTHA PENA   MRN-[id number]    DOB1935    HPI:  88 yo m pmhx Afib on Xarelto, Alzheimer's Dementia (bedbound, minimally verbal), HTN, HLD, CAD, DM2, CVA, BPH, PVD, fall s/p fx L2 fx s/p L1-L4 fusion biba from home with ams.  Per family patient has been eating less over the past few days, less verbal than his barely verbal baseline.  This am they tried to feed him an ensure however about an hour or so later it was coming from his mouth (?residual vs vomiting).  In the ED patient tachycardic to the 160s, hypoxic tot he 80s placed on NRB, febrile to 104.9F, found to be dehydrated, with and elevated lactate, troponin and bun/cr.  Patient given ~500 cc IVF (secondary to concern for chf/afib--> overload) by ED team.  Patient became hypotensive and was started on levophed.  Patient given azithromycin, ceftriaxone, IV tylenol.  Admit to ICU.     Of note last dose of Xarelto was 5/30 am.  (31 May 2024 20:42)    Bedside Pocus Chest: A-line predominant, normal aeration pattern bilat. small left sided simple effusion with dynamic air bronchograms     CT Head: No acute intracranial abnormality, Old large right parietal lobe infarct. Mild chronic microvascular ischemic disease, Moderate diffuse cerebral volume loss.  CRX : Left lower lung zone ill-defined opacity and moderate pleural effusion.  Urine Culture: >100,000 CFU/ml  Escherichia coli      Interval Events: pt with Afib with RVR overnight, started on Amiodarone drip, D/C this AM pt now off of pressor support, continues on heparin infusion continues with abt tx for Ecoli  UTI ,    PAST MEDICAL & SURGICAL HISTORY:  HTN (hypertension)  Prostate cancer   Hx of radiation therapy   Cataract of left eye  Borderline diabetes  Kidney cyst, acquired left  Atrial fibrillation  Cervical disc displacement  Obesity (BMI 30-39.9)  Cataract extraction status of left eye  Larynx polyp  Excision of larynx polyp;     FAMILY HISTORY:  FH: dementia (Mother)    ROS:                Dyspnea (Lasha 0-10): 0                       N/V (Y/N):  N                            Secretions (Y/N) :  N             Agitation(Y/N): N  Pain (Y/N):  pt demies      -Provocation/Palliation:  -Quality/Quantity:  -Radiating:  -Severity:  -Timing/Frequency:  -Impact on ADLs:    Further  review of systems negative     Allergies    aspirin (Rash)    Intolerances    Opiate Naive (Y/N): Y  -iStop reviewed (Y/N): | Reference #: [id number]    MEDICATIONS:      MEDICATIONS  (STANDING):  cefTRIAXone   IVPB 1000 milliGRAM(s) IV Intermittent every 24 hours  chlorhexidine 2% Cloths 1 Application(s) Topical <User Schedule>  insulin lispro (ADMELOG) corrective regimen sliding scale   SubCutaneous every 6 hours  lactated ringers. 1000 milliLiter(s) (100 mL/Hr) IV Continuous <Continuous>  rivaroxaban 20 milliGRAM(s) Oral with dinner    MEDICATIONS  (PRN):    LABS:    CBC:                        9.6    8.46  )-----------( 140      ( 2024 02:50 )             29.4     CMP:    06-03    149<H>  |  118<H>  |  41<H>  ----------------------------<  147<H>  3.9   |  27  |  1.29    Ca    8.8      2024 02:50  Phos  2.2     06-03  Mg     1.8     06-03    TPro  6.8  /  Alb  1.9<L>  /  TBili  0.4  /  DBili  x   /  AST  41<H>  /  ALT  25  /  AlkPhos  64  06-02    PTT - ( 2024 02:50 )  PTT:66.9 sec  Urinalysis Basic - ( 2024 02:50 )    Color: x / Appearance: x / SG: x / pH: x  Gluc: 147 mg/dL / Ketone: x  / Bili: x / Urobili: x   Blood: x / Protein: x / Nitrite: x   Leuk Esterase: x / RBC: x / WBC x   Sq Epi: x / Non Sq Epi: x / Bacteria: x    IMAGING:     < from: TTE Echo Complete w/ Contrast w/ Doppler (24 @ 10:42) >  ACC: [id number] EXAM:  ECHO TTE WITH CON COMP W DOPP                          PROCEDURE DATE:  2024        CONCLUSIONS:     1. Left ventricular systolic function is normal with an ejection   fraction visually estimated at 50 to 55 %.   2. There is severe (grade 3) left ventricular diastolic dysfunction.   3. The left atrium is moderately dilated.   4. Fibrocalcific aortic valve sclerosis without stenosis.   5. Mild aortic regurgitation.   6. Severe tricuspid regurgitation.   7. Normal right ventricular cavity size, with normal wall thickness, and   normal systolic function.   8. The right atrium is mildly dilated.   9. No pericardial effusion seen.    < from: CT Head No Cont (24 @ 19:18) >  ACC: [id number] EXAM:  CT BRAIN   ORDERED BY: Lynette Kingsley     IMPRESSION:  1.No acute intracranial abnormality  2.Old large right parietal lobe infarct  3.Mild chronic microvascular ischemic disease  4.Moderate diffuse cerebral volume loss    < from: Xray Chest 2 Views PA/Lat (24 @ 19:21) >  ACC: [id number] EXAM:  XR CHEST PORTABLE URGENT 1V     PROCEDURE DATE:  2024      Prior examination for comparison: 2023    IMPRESSION:    Left lower lung zone ill-defined opacity and moderate pleural effusion.    Culture - Urine (24 @ 23:35)    Specimen Source: Clean Catch Clean Catch (Midstream)   Culture Results:   >100,000 CFU/ml Escherichia coli    Culture - Blood (24 @ 18:54)   Specimen Source: .Blood Blood-Peripheral  Culture Results:   No growth at 24 hours    PHYSICAL EXAM:  T(C): 38.1 (24 @ 11:00), Max: 38.1 (24 @ 10:30)  HR: 90 (24 @ 11:00) (82 - 122)  BP: 88/47 (24 @ 11:00) (32/98 - 146/100)  RR: 24 (24 @ 11:00) (15 - 27)  SpO2: 100% (24 @ 11:00) (96% - 100%)    Daily Weight in k.9 (2024 04:00)    CAPILLARY BLOOD GLUCOSE    POCT Blood Glucose.: 164 mg/dL (2024 05:29)    I&O's Summary    2024 07:01  -  2024 07:00  --------------------------------------------------------  IN: 3228.2 mL / OUT: 1525 mL / NET: 1703.2 mL    General: elderly, comfortable appearing, in NAD  HEENT: Atraumatic, Normocephalic, conjunctiva and sclera clear  NECK: Supple, No JVD  LUNGS: Clear to auscultation bilaterally; No rhonchi, rales or  wheeze  HEART: irregular, tachycardic   GI: soft non tender, normoactive BS  Last BM 6/3  : wilson cath to BSD  Musc:  weakness   Neuro: awake, tracks with eyes, NV    Psych:  calm and cooperative    Skin: R heel DTI, Stage 2 to sacrum/gluteal fold   Lymph: no lymphadenopathy     Preadmit Karnofsky: 30-40 %           Current Karnofsky:  30 %  Cachexia (Y/N):   BMI: 23.0    ADVANCED DIRECTIVES:  DNR[facility]DNI  [facility]    Decision maker/Legal surrogate: Michaelle Pena wife defers medical decision making to daughter Michaelle Vann     GOALS OF CARE DISCUSSION:  Palliative care info/counseling provided	      Family meeting  Advanced Directives addressed please see [facility] discussion note below

## 2024-06-03 NOTE — CONSULT NOTE ADULT - PROBLEM SELECTOR RECOMMENDATION 9
In the ED patient tachycardic to the 160s, hypoxic to 80s placed on NRB,   febrile to 104.9F, found to be dehydrated, with and elevated lactate, troponin and bun/cr.    hypotensive requiring Levophed levophed.  Patient given azithromycin, ceftriaxone, IV tylenol.     CXR: Small pleural effusion in L base.  Urine Culture: >100,000 CFU/ml  Escherichia coli  BC negative x 2    Presently afebrile, saturating 100% on RA  Continue Ceftriaxone for UTI

## 2024-06-03 NOTE — SWALLOW BEDSIDE ASSESSMENT ADULT - SWALLOW EVAL: PATIENT/FAMILY GOALS STATEMENT
son report "he coughs with water" and pt takes ensure son report "he coughs with water", pt takes ensure and doesn't like the thick liquid.

## 2024-06-03 NOTE — CONSULT NOTE ADULT - PROBLEM SELECTOR RECOMMENDATION 4
Moderate malnutrition in the context of chronic illness  Inadequate protein-energy intake and increased protein needs in setting of pressure injuries, dementia  Physical findings of moderate muscle wasting; >20% weight loss x 5 months (35 lbs)  Albumin 1.9    Seen by speech and swallow this AM with recommendations for puree with thickened liquid diet Weight

## 2024-06-04 LAB
-  AMOXICILLIN/CLAVULANIC ACID: SIGNIFICANT CHANGE UP
-  AMPICILLIN/SULBACTAM: SIGNIFICANT CHANGE UP
-  AMPICILLIN: SIGNIFICANT CHANGE UP
-  AZTREONAM: SIGNIFICANT CHANGE UP
-  CEFAZOLIN: SIGNIFICANT CHANGE UP
-  CEFEPIME: SIGNIFICANT CHANGE UP
-  CEFOXITIN: SIGNIFICANT CHANGE UP
-  CEFTRIAXONE: SIGNIFICANT CHANGE UP
-  CEFUROXIME: SIGNIFICANT CHANGE UP
-  CIPROFLOXACIN: SIGNIFICANT CHANGE UP
-  ERTAPENEM: SIGNIFICANT CHANGE UP
-  GENTAMICIN: SIGNIFICANT CHANGE UP
-  IMIPENEM: SIGNIFICANT CHANGE UP
-  LEVOFLOXACIN: SIGNIFICANT CHANGE UP
-  MEROPENEM: SIGNIFICANT CHANGE UP
-  NITROFURANTOIN: SIGNIFICANT CHANGE UP
-  PIPERACILLIN/TAZOBACTAM: SIGNIFICANT CHANGE UP
-  TOBRAMYCIN: SIGNIFICANT CHANGE UP
-  TRIMETHOPRIM/SULFAMETHOXAZOLE: SIGNIFICANT CHANGE UP
ANION GAP SERPL CALC-SCNC: 3 MMOL/L — LOW (ref 5–17)
BUN SERPL-MCNC: 34 MG/DL — HIGH (ref 7–23)
CALCIUM SERPL-MCNC: 8.9 MG/DL — SIGNIFICANT CHANGE UP (ref 8.5–10.1)
CHLORIDE SERPL-SCNC: 117 MMOL/L — HIGH (ref 96–108)
CO2 SERPL-SCNC: 30 MMOL/L — SIGNIFICANT CHANGE UP (ref 22–31)
CREAT SERPL-MCNC: 1.13 MG/DL — SIGNIFICANT CHANGE UP (ref 0.5–1.3)
CULTURE RESULTS: ABNORMAL
EGFR: 63 ML/MIN/1.73M2 — SIGNIFICANT CHANGE UP
GLUCOSE BLDC GLUCOMTR-MCNC: 112 MG/DL — HIGH (ref 70–99)
GLUCOSE BLDC GLUCOMTR-MCNC: 152 MG/DL — HIGH (ref 70–99)
GLUCOSE BLDC GLUCOMTR-MCNC: 162 MG/DL — HIGH (ref 70–99)
GLUCOSE SERPL-MCNC: 134 MG/DL — HIGH (ref 70–99)
HCT VFR BLD CALC: 30.8 % — LOW (ref 39–50)
HGB BLD-MCNC: 10.1 G/DL — LOW (ref 13–17)
MAGNESIUM SERPL-MCNC: 1.7 MG/DL — SIGNIFICANT CHANGE UP (ref 1.6–2.6)
MCHC RBC-ENTMCNC: 29.8 PG — SIGNIFICANT CHANGE UP (ref 27–34)
MCHC RBC-ENTMCNC: 32.8 G/DL — SIGNIFICANT CHANGE UP (ref 32–36)
MCV RBC AUTO: 90.9 FL — SIGNIFICANT CHANGE UP (ref 80–100)
METHOD TYPE: SIGNIFICANT CHANGE UP
NRBC # BLD: 0 /100 WBCS — SIGNIFICANT CHANGE UP (ref 0–0)
ORGANISM # SPEC MICROSCOPIC CNT: ABNORMAL
ORGANISM # SPEC MICROSCOPIC CNT: SIGNIFICANT CHANGE UP
PHOSPHATE SERPL-MCNC: 3.1 MG/DL — SIGNIFICANT CHANGE UP (ref 2.5–4.5)
PLATELET # BLD AUTO: 146 K/UL — LOW (ref 150–400)
POTASSIUM SERPL-MCNC: 3.7 MMOL/L — SIGNIFICANT CHANGE UP (ref 3.5–5.3)
POTASSIUM SERPL-SCNC: 3.7 MMOL/L — SIGNIFICANT CHANGE UP (ref 3.5–5.3)
RBC # BLD: 3.39 M/UL — LOW (ref 4.2–5.8)
RBC # FLD: 14.8 % — HIGH (ref 10.3–14.5)
SODIUM SERPL-SCNC: 150 MMOL/L — HIGH (ref 135–145)
SPECIMEN SOURCE: SIGNIFICANT CHANGE UP
WBC # BLD: 8.15 K/UL — SIGNIFICANT CHANGE UP (ref 3.8–10.5)
WBC # FLD AUTO: 8.15 K/UL — SIGNIFICANT CHANGE UP (ref 3.8–10.5)

## 2024-06-04 PROCEDURE — 99497 ADVNCD CARE PLAN 30 MIN: CPT | Mod: 25

## 2024-06-04 PROCEDURE — 99291 CRITICAL CARE FIRST HOUR: CPT

## 2024-06-04 PROCEDURE — 99232 SBSQ HOSP IP/OBS MODERATE 35: CPT | Mod: 25

## 2024-06-04 RX ORDER — MAGNESIUM SULFATE 500 MG/ML
1 VIAL (ML) INJECTION ONCE
Refills: 0 | Status: COMPLETED | OUTPATIENT
Start: 2024-06-04 | End: 2024-06-04

## 2024-06-04 RX ORDER — POTASSIUM CHLORIDE 20 MEQ
20 PACKET (EA) ORAL ONCE
Refills: 0 | Status: COMPLETED | OUTPATIENT
Start: 2024-06-04 | End: 2024-06-04

## 2024-06-04 RX ORDER — ACETAMINOPHEN 500 MG
1000 TABLET ORAL ONCE
Refills: 0 | Status: COMPLETED | OUTPATIENT
Start: 2024-06-04 | End: 2024-06-04

## 2024-06-04 RX ADMIN — SODIUM CHLORIDE 100 MILLILITER(S): 9 INJECTION, SOLUTION INTRAVENOUS at 04:58

## 2024-06-04 RX ADMIN — Medication 400 MILLIGRAM(S): at 12:45

## 2024-06-04 RX ADMIN — CHLORHEXIDINE GLUCONATE 1 APPLICATION(S): 213 SOLUTION TOPICAL at 01:02

## 2024-06-04 RX ADMIN — RIVAROXABAN 20 MILLIGRAM(S): KIT at 17:17

## 2024-06-04 RX ADMIN — MIDODRINE HYDROCHLORIDE 20 MILLIGRAM(S): 2.5 TABLET ORAL at 21:10

## 2024-06-04 RX ADMIN — MIDODRINE HYDROCHLORIDE 20 MILLIGRAM(S): 2.5 TABLET ORAL at 06:26

## 2024-06-04 RX ADMIN — MIDODRINE HYDROCHLORIDE 20 MILLIGRAM(S): 2.5 TABLET ORAL at 13:35

## 2024-06-04 RX ADMIN — Medication 20 MILLIEQUIVALENT(S): at 06:26

## 2024-06-04 RX ADMIN — Medication 100 GRAM(S): at 05:44

## 2024-06-04 RX ADMIN — Medication 1000 MILLIGRAM(S): at 14:23

## 2024-06-04 RX ADMIN — Medication 2: at 11:12

## 2024-06-04 RX ADMIN — CEFTRIAXONE 100 MILLIGRAM(S): 500 INJECTION, POWDER, FOR SOLUTION INTRAMUSCULAR; INTRAVENOUS at 21:18

## 2024-06-04 NOTE — PROGRESS NOTE ADULT - CONVERSATION DETAILS
Met with pt son in law Mason Vann at bedside for follow up to lengthy GO discussion yesterday. Mason states that he was able to speak with pt longtime PMD Dr Oquendo late yesterday. Per Mr Vann, physician was onboard with home hospice with focus on comfort and QOL. Mr Vann and his family wishes to continue conservative medical management in attempt to reverse what is reversible, but do not wish for aggressive measures to include but not limited to CPR, ventilatory support and PGT placement. Discussed comfort feeds with strict aspiration precautions to which family is onboard    They are now agreeable to home hospice and understand that hospice will provide an additional layer of care to assist families in attempts to keep patient comfortable in their own home. Reviewed hospice 24/7 availability to avoid 911 calls.     Referral made. Discussed with April Son NP

## 2024-06-04 NOTE — PROGRESS NOTE ADULT - PROBLEM SELECTOR PLAN 6
Met with ROBYN at bedside. His wife Alma is decision maker, Mason Vann is acting on his wife's behalf as she is working, he is pt CDPAP aide. Family now agreeable to home hospice services  DNR/DNI  MOLST on chart

## 2024-06-04 NOTE — PROGRESS NOTE ADULT - SUBJECTIVE AND OBJECTIVE BOX
CC: Patient is a 89y old  Male who presents with a chief complaint of Sepsis, Dehydration (04 Jun 2024 10:45)      ## HPI:HPI:  89 yo m pmhx Afib on Xarelto, Alzheimer's Dementia (bedbound, minimally verbal), HTN, HLD, CAD, DM2, CVA, BPH, PVD, fall s/p fx L2 fx s/p L1-L4 fusion biba from home with ams.  Per family patient has been eating less over the past few days, less verbal than his barely verbal baseline.  This am they tried to feed him an ensure however about an hour or so later it was coming from his mouth (?residual vs vomiting).  In the ED patient tachycardic to the 160s, hypoxic tot he 80s placed on NRB,  febrile to 104.9F, found to be dehydrated, with and elevated lactate, troponin and bun/cr.  Patient given ~500 cc IVF (secondary to concern for chf/afib--> overload) by ED team.  Patient became hypotensive and was started on levophed.  Patient given azithromycin, ceftriaxone, IV tylenol.  Admit to ICU.     Of note last dose of Xarelto was 5/30 am.  (31 May 2024 20:42)      O/N: no events    ## ROS:  unable to assess ros due to mental status   ## EXAM  ICU Vital Signs Last 24 Hrs  T(C): 37.7 (04 Jun 2024 10:00), Max: 38.2 (03 Jun 2024 13:00)  T(F): 99.9 (04 Jun 2024 10:00), Max: 100.8 (03 Jun 2024 13:00)  HR: 90 (04 Jun 2024 10:00) (61 - 93)  BP: 160/138 (04 Jun 2024 10:00) (74/41 - 160/138)  BP(mean): 145 (04 Jun 2024 10:00) (51 - 145)  ABP: --  ABP(mean): --  RR: 31 (04 Jun 2024 10:00) (13 - 31)  SpO2: 100% (04 Jun 2024 10:00) (89% - 100%)    O2 Parameters below as of 04 Jun 2024 08:00  Patient On (Oxygen Delivery Method): room air          CON : NAD  EENT : EOMI, MMM  NECK : Full ROM  RESP : CTAB no increased WOB  CARD : rrr no m/r/g  ABD : S NT ND NABS no rebound  EXT : No edema  NEURO : tracks  ## Labs:  Lab Results:  CBC  CBC Full  -  ( 04 Jun 2024 04:01 )  WBC Count : 8.15 K/uL  RBC Count : 3.39 M/uL  Hemoglobin : 10.1 g/dL  Hematocrit : 30.8 %  Platelet Count - Automated : 146 K/uL  Mean Cell Volume : 90.9 fl  Mean Cell Hemoglobin : 29.8 pg  Mean Cell Hemoglobin Concentration : 32.8 g/dL  Auto Neutrophil # : x  Auto Lymphocyte # : x  Auto Monocyte # : x  Auto Eosinophil # : x  Auto Basophil # : x  Auto Neutrophil % : x  Auto Lymphocyte % : x  Auto Monocyte % : x  Auto Eosinophil % : x  Auto Basophil % : x    .		Differential:	[] Automated		[] Manual  Chemistry                        10.1   8.15  )-----------( 146      ( 04 Jun 2024 04:01 )             30.8     06-04    150<H>  |  117<H>  |  34<H>  ----------------------------<  134<H>  3.7   |  30  |  1.13    Ca    8.9      04 Jun 2024 04:01  Phos  3.1     06-04  Mg     1.7     06-04        PTT - ( 03 Jun 2024 02:50 )  PTT:66.9 sec  Urinalysis Basic - ( 04 Jun 2024 04:01 )    Color: x / Appearance: x / SG: x / pH: x  Gluc: 134 mg/dL / Ketone: x  / Bili: x / Urobili: x   Blood: x / Protein: x / Nitrite: x   Leuk Esterase: x / RBC: x / WBC x   Sq Epi: x / Non Sq Epi: x / Bacteria: x                  MICROBIOLOGY/CULTURES:  Culture Results:   >100,000 CFU/ml Escherichia coli (05-31 @ 23:35)  Culture Results:   No growth at 48 Hours (05-31 @ 18:54)  Culture Results:   No growth at 48 Hours (05-31 @ 18:54)      RADIOLOGY RESULTS:        ## Medications:  MEDICATIONS  (STANDING):  cefTRIAXone   IVPB 1000 milliGRAM(s) IV Intermittent every 24 hours  chlorhexidine 2% Cloths 1 Application(s) Topical <User Schedule>  insulin lispro (ADMELOG) corrective regimen sliding scale   SubCutaneous every 6 hours  lactated ringers. 1000 milliLiter(s) (100 mL/Hr) IV Continuous <Continuous>  midodrine 20 milliGRAM(s) Oral every 8 hours  rivaroxaban 20 milliGRAM(s) Oral with dinner    ## O/E:I&O's Summary    03 Jun 2024 07:01  -  04 Jun 2024 07:00  --------------------------------------------------------  IN: 2914 mL / OUT: 700 mL / NET: 2214 mL    04 Jun 2024 07:01  -  04 Jun 2024 12:18  --------------------------------------------------------  IN: 435 mL / OUT: 350 mL / NET: 85 mL        POCUS :   DVT PPX  ## Code status:  Goals of care discussion: [] yes [ ] no  [] full code  [X ] DNR  [ X] DNI  [ X] JYOTI

## 2024-06-04 NOTE — PROGRESS NOTE ADULT - PROBLEM SELECTOR PLAN 4
in the context of chronic illness  Inadequate protein-energy intake and increased protein needs in setting of pressure injuries, dementia  Physical findings of moderate muscle wasting; >20% weight loss x 5 months (35 lbs)  Albumin 1.9    Seen by speech and swallow 6/3  with recommendations for puree with thickened liquid diet.  Appetite waxes and wanes

## 2024-06-04 NOTE — PROGRESS NOTE ADULT - SUBJECTIVE AND OBJECTIVE BOX
follow up on:  complex medical decision making related to goals of care    OVERNIGHT EVENTS:  pt awake his AM, resting comfortably on RA, ROBYN at bedside     Review of systems:     Pain:  [ ] yes [x ] no  QOL impact -   Location -                    Aggravating factors -  Quality -  Radiation -  Timing-  Severity (0-10 scale):  Minimal acceptable level (0-10 scale):     Dyspnea:      denies                        Anxiety:         denies                      Depression:   denies  Fatigue:      denies                         Nausea:      denies                         Loss of appetite:    presen           Constipation:     denies             Diarrhea:     denies    All other systems reviewed and negative    MEDICATIONS  (STANDING):  cefTRIAXone   IVPB 1000 milliGRAM(s) IV Intermittent every 24 hours  chlorhexidine 2% Cloths 1 Application(s) Topical <User Schedule>  insulin lispro (ADMELOG) corrective regimen sliding scale   SubCutaneous every 6 hours  lactated ringers. 1000 milliLiter(s) (100 mL/Hr) IV Continuous <Continuous>  midodrine 20 milliGRAM(s) Oral every 8 hours  rivaroxaban 20 milliGRAM(s) Oral with dinner    MEDICATIONS  (PRN):    PHYSICAL EXAM:  Vital Signs Last 24 Hrs  T(C): 37.7 (04 Jun 2024 10:00), Max: 38.2 (03 Jun 2024 13:00)  T(F): 99.9 (04 Jun 2024 10:00), Max: 100.8 (03 Jun 2024 13:00)  HR: 90 (04 Jun 2024 10:00) (61 - 93)  BP: 160/138 (04 Jun 2024 10:00) (74/41 - 160/138)  BP(mean): 145 (04 Jun 2024 10:00) (51 - 145)  RR: 31 (04 Jun 2024 10:00) (13 - 31)  SpO2: 100% (04 Jun 2024 10:00) (89% - 100%)    Parameters below as of 04 Jun 2024 08:00  Patient On (Oxygen Delivery Method): room air     General: elderly, comfortable appearing, in NAD  HEENT: Atraumatic, Normocephalic, conjunctiva and sclera clear  NECK: Supple, No JVD  LUNGS: Clear to auscultation bilaterally; No rhonchi, rales or  wheeze  HEART: irregular, tachycardic   GI: soft non tender, normoactive BS  Last BM 6/3  : wilson cath to BSD  Musc:  weakness   Neuro: awake, tracks with eyes, NV    Psych:  calm and cooperative    Skin: R heel DTI, Stage 2 to sacrum/gluteal fold   Lymph: no lymphadenopathy   Appetite: por to fair       Palliative Performance Scale/Karnofsky Score: 30%    LABS:                          10.1   8.15  )-----------( 146      ( 04 Jun 2024 04:01 )             30.8     06-04    150<H>  |  117<H>  |  34<H>  ----------------------------<  134<H>%  3.7   |  30  |  1.13    Ca    8.9      04 Jun 2024 04:01  Phos  3.1     06-04  Mg     1.7     06-04      Urinalysis Basic - ( 04 Jun 2024 04:01 )    Color: x / Appearance: x / SG: x / pH: x  Gluc: 134 mg/dL / Ketone: x  / Bili: x / Urobili: x   Blood: x / Protein: x / Nitrite: x   Leuk Esterase: x / RBC: x / WBC x   Sq Epi: x / Non Sq Epi: x / Bacteria: x    RADIOLOGY & ADDITIONAL STUDIES: no new radiological studies

## 2024-06-04 NOTE — PROGRESS NOTE ADULT - PROBLEM SELECTOR PLAN 1
CXR: Small pleural effusion in L base.  Urine Culture: >100,000 CFU/ml  Escherichia coli  BC negative x 2    Presently afebrile, saturating 100% on RA  Continue Ceftriaxone for UTI. Day 2/3

## 2024-06-05 LAB
M PNEUMO IGM SER-ACNC: 0.17 INDEX — SIGNIFICANT CHANGE UP (ref 0–0.9)
MYCOPLASMA AG SPEC QL: NEGATIVE — SIGNIFICANT CHANGE UP

## 2024-06-05 PROCEDURE — 99233 SBSQ HOSP IP/OBS HIGH 50: CPT

## 2024-06-05 RX ORDER — POTASSIUM CHLORIDE 20 MEQ
10 PACKET (EA) ORAL
Refills: 0 | Status: DISCONTINUED | OUTPATIENT
Start: 2024-06-05 | End: 2024-06-05

## 2024-06-05 RX ORDER — ACETAMINOPHEN 500 MG
1000 TABLET ORAL ONCE
Refills: 0 | Status: COMPLETED | OUTPATIENT
Start: 2024-06-05 | End: 2024-06-05

## 2024-06-05 RX ADMIN — MIDODRINE HYDROCHLORIDE 20 MILLIGRAM(S): 2.5 TABLET ORAL at 06:19

## 2024-06-05 RX ADMIN — MIDODRINE HYDROCHLORIDE 20 MILLIGRAM(S): 2.5 TABLET ORAL at 21:26

## 2024-06-05 RX ADMIN — Medication 400 MILLIGRAM(S): at 11:57

## 2024-06-05 RX ADMIN — MIDODRINE HYDROCHLORIDE 20 MILLIGRAM(S): 2.5 TABLET ORAL at 13:58

## 2024-06-05 RX ADMIN — CEFTRIAXONE 100 MILLIGRAM(S): 500 INJECTION, POWDER, FOR SOLUTION INTRAMUSCULAR; INTRAVENOUS at 22:11

## 2024-06-05 RX ADMIN — Medication 1000 MILLIGRAM(S): at 12:57

## 2024-06-05 RX ADMIN — RIVAROXABAN 20 MILLIGRAM(S): KIT at 16:36

## 2024-06-05 RX ADMIN — CHLORHEXIDINE GLUCONATE 1 APPLICATION(S): 213 SOLUTION TOPICAL at 03:40

## 2024-06-05 NOTE — PROGRESS NOTE ADULT - NUTRITIONAL ASSESSMENT
This patient has been assessed with a concern for Malnutrition and has been determined to have a diagnosis/diagnoses of Moderate protein-calorie malnutrition.    This patient is being managed with:   Diet Pureed-  Moderately Thick Liquids (MODTHICKLIQS)  Entered: Fox  3 2024 11:39AM  
· Assessment	 septic shock

## 2024-06-05 NOTE — PROGRESS NOTE ADULT - SUBJECTIVE AND OBJECTIVE BOX
CC: Patient is a 89y old  Male who presents with a chief complaint of Sepsis, Dehydration (04 Jun 2024 12:17)      ## HPI:HPI:  88 yo m pmhx Afib on Xarelto, Alzheimer's Dementia (bedbound, minimally verbal), HTN, HLD, CAD, DM2, CVA, BPH, PVD, fall s/p fx L2 fx s/p L1-L4 fusion biba from home with ams.  Per family patient has been eating less over the past few days, less verbal than his barely verbal baseline.  This am they tried to feed him an ensure however about an hour or so later it was coming from his mouth (?residual vs vomiting).  In the ED patient tachycardic to the 160s, hypoxic tot he 80s placed on NRB,  febrile to 104.9F, found to be dehydrated, with and elevated lactate, troponin and bun/cr.  Patient given ~500 cc IVF (secondary to concern for chf/afib--> overload) by ED team.  Patient became hypotensive and was started on levophed.  Patient given azithromycin, ceftriaxone, IV tylenol.  Admit to ICU.     Of note last dose of Xarelto was 5/30 am.  (31 May 2024 20:42)      O/N: pending home hospice    ## ROS:  unable to assess ros due to mental status   ## EXAM  ICU Vital Signs Last 24 Hrs  T(C): 38.3 (05 Jun 2024 10:00), Max: 38.4 (04 Jun 2024 13:00)  T(F): 100.9 (05 Jun 2024 10:00), Max: 101.1 (04 Jun 2024 13:00)  HR: 89 (05 Jun 2024 10:00) (66 - 92)  BP: 117/45 (05 Jun 2024 10:00) (92/55 - 154/96)  BP(mean): 65 (05 Jun 2024 10:00) (65 - 104)  ABP: --  ABP(mean): --  RR: 13 (05 Jun 2024 10:00) (13 - 29)  SpO2: 97% (05 Jun 2024 10:00) (96% - 100%)    O2 Parameters below as of 05 Jun 2024 07:15  Patient On (Oxygen Delivery Method): room air          CON : NAD  EENT : EOMI, MMM  NECK : Full ROM  RESP : CTAB no increased WOB  CARD : rrr no m/r/g  ABD : S NT ND NABS no rebound  EXT : No edema  NEURO : opens eyes   ## Labs:  Lab Results:  CBC  CBC Full  -  ( 04 Jun 2024 04:01 )  WBC Count : 8.15 K/uL  RBC Count : 3.39 M/uL  Hemoglobin : 10.1 g/dL  Hematocrit : 30.8 %  Platelet Count - Automated : 146 K/uL  Mean Cell Volume : 90.9 fl  Mean Cell Hemoglobin : 29.8 pg  Mean Cell Hemoglobin Concentration : 32.8 g/dL  Auto Neutrophil # : x  Auto Lymphocyte # : x  Auto Monocyte # : x  Auto Eosinophil # : x  Auto Basophil # : x  Auto Neutrophil % : x  Auto Lymphocyte % : x  Auto Monocyte % : x  Auto Eosinophil % : x  Auto Basophil % : x    .		Differential:	[] Automated		[] Manual  Chemistry                        10.1   8.15  )-----------( 146      ( 04 Jun 2024 04:01 )             30.8     06-04    150<H>  |  117<H>  |  34<H>  ----------------------------<  134<H>  3.7   |  30  |  1.13    Ca    8.9      04 Jun 2024 04:01  Phos  3.1     06-04  Mg     1.7     06-04          Urinalysis Basic - ( 04 Jun 2024 04:01 )    Color: x / Appearance: x / SG: x / pH: x  Gluc: 134 mg/dL / Ketone: x  / Bili: x / Urobili: x   Blood: x / Protein: x / Nitrite: x   Leuk Esterase: x / RBC: x / WBC x   Sq Epi: x / Non Sq Epi: x / Bacteria: x                  MICROBIOLOGY/CULTURES:  Culture Results:   >100,000 CFU/ml Escherichia coli (05-31 @ 23:35)  Culture Results:   No growth at 72 Hours (05-31 @ 18:54)  Culture Results:   No growth at 72 Hours (05-31 @ 18:54)      RADIOLOGY RESULTS:        ## Medications:  MEDICATIONS  (STANDING):  cefTRIAXone   IVPB 1000 milliGRAM(s) IV Intermittent every 24 hours  chlorhexidine 2% Cloths 1 Application(s) Topical <User Schedule>  midodrine 20 milliGRAM(s) Oral every 8 hours  rivaroxaban 20 milliGRAM(s) Oral with dinner    ## O/E:I&O's Summary    04 Jun 2024 07:01  -  05 Jun 2024 07:00  --------------------------------------------------------  IN: 1345 mL / OUT: 1155 mL / NET: 190 mL    05 Jun 2024 07:01  -  05 Jun 2024 11:12  --------------------------------------------------------  IN: 60 mL / OUT: 190 mL / NET: -130 mL        POCUS :   DVT PPX  ## Code status:  Goals of care discussion: [] yes [ ] no  [] full code  [ ] DNR  [ ] DNI  [ X] AMANDAST

## 2024-06-06 ENCOUNTER — TRANSCRIPTION ENCOUNTER (OUTPATIENT)
Age: 89
End: 2024-06-06

## 2024-06-06 VITALS — DIASTOLIC BLOOD PRESSURE: 62 MMHG | SYSTOLIC BLOOD PRESSURE: 120 MMHG

## 2024-06-06 PROCEDURE — 99232 SBSQ HOSP IP/OBS MODERATE 35: CPT

## 2024-06-06 RX ORDER — RIVAROXABAN 15 MG-20MG
1 KIT ORAL
Qty: 0 | Refills: 0 | DISCHARGE
Start: 2024-06-06

## 2024-06-06 RX ORDER — METFORMIN HYDROCHLORIDE 850 MG/1
500 TABLET ORAL
Qty: 0 | Refills: 0 | DISCHARGE

## 2024-06-06 RX ORDER — SERTRALINE 25 MG/1
1 TABLET, FILM COATED ORAL
Qty: 0 | Refills: 0 | DISCHARGE

## 2024-06-06 RX ORDER — MORPHINE SULFATE 50 MG/1
0.25 CAPSULE, EXTENDED RELEASE ORAL
Qty: 4.5 | Refills: 0
Start: 2024-06-06 | End: 2024-06-08

## 2024-06-06 RX ORDER — SIMVASTATIN 20 MG/1
1 TABLET, FILM COATED ORAL
Qty: 0 | Refills: 0 | DISCHARGE

## 2024-06-06 RX ORDER — DONEPEZIL HYDROCHLORIDE 10 MG/1
1 TABLET, FILM COATED ORAL
Qty: 0 | Refills: 0 | DISCHARGE

## 2024-06-06 RX ORDER — MORPHINE SULFATE 50 MG/1
0.25 CAPSULE, EXTENDED RELEASE ORAL
Qty: 15 | Refills: 0
Start: 2024-06-06 | End: 2024-06-15

## 2024-06-06 RX ORDER — MIDODRINE HYDROCHLORIDE 2.5 MG/1
2 TABLET ORAL
Qty: 0 | Refills: 0 | DISCHARGE
Start: 2024-06-06

## 2024-06-06 RX ORDER — ACETAMINOPHEN 500 MG
650 TABLET ORAL EVERY 6 HOURS
Refills: 0 | Status: DISCONTINUED | OUTPATIENT
Start: 2024-06-06 | End: 2024-06-06

## 2024-06-06 RX ORDER — MORPHINE SULFATE 50 MG/1
0.25 CAPSULE, EXTENDED RELEASE ORAL
Qty: 15 | Refills: 0
Start: 2024-06-06 | End: 2024-06-08

## 2024-06-06 RX ORDER — MEMANTINE HYDROCHLORIDE 10 MG/1
1 TABLET ORAL
Qty: 0 | Refills: 0 | DISCHARGE

## 2024-06-06 RX ADMIN — Medication 650 MILLIGRAM(S): at 06:28

## 2024-06-06 RX ADMIN — CHLORHEXIDINE GLUCONATE 1 APPLICATION(S): 213 SOLUTION TOPICAL at 06:01

## 2024-06-06 RX ADMIN — MIDODRINE HYDROCHLORIDE 20 MILLIGRAM(S): 2.5 TABLET ORAL at 05:42

## 2024-06-06 RX ADMIN — Medication 650 MILLIGRAM(S): at 05:41

## 2024-06-06 NOTE — DISCHARGE NOTE PROVIDER - CARE PROVIDER_API CALL
[facility],   Please follow up with home hospice network  Phone: (   )    -  Fax: (   )    -  Follow Up Time: 1-3 days

## 2024-06-06 NOTE — DISCHARGE NOTE PROVIDER - NSDCCPCAREPLAN_GEN_ALL_CORE_FT
PRINCIPAL DISCHARGE DIAGNOSIS  Diagnosis: Septic shock  Assessment and Plan of Treatment:       SECONDARY DISCHARGE DIAGNOSES  Diagnosis: Alzheimers disease  Assessment and Plan of Treatment:     Diagnosis: Atrial fibrillation with RVR  Assessment and Plan of Treatment:     Diagnosis: ALBINO (acute kidney injury)  Assessment and Plan of Treatment:     Diagnosis: Dehydration with hypernatremia  Assessment and Plan of Treatment:     Diagnosis: Altered mental status  Assessment and Plan of Treatment:

## 2024-06-06 NOTE — DISCHARGE NOTE NURSING/CASE MANAGEMENT/SOCIAL WORK - PATIENT PORTAL LINK FT
You can access the FollowMyHealth Patient Portal offered by [facility] by registering at the following website: [email or web address]. By joining [facility]’s FollowMyHealth portal, you will also be able to view your health information using other applications (apps) compatible with our system.

## 2024-06-06 NOTE — PROGRESS NOTE ADULT - SUBJECTIVE AND OBJECTIVE BOX
CHIEF COMPLAINT:    Interval Events:    REVIEW OF SYSTEMS:  Constitutional: [ ] fevers [ ] chills [ ] weight loss [ ] weight gain  HEENT: [ ] dry eyes [ ] eye irritation [ ] postnasal drip [ ] nasal congestion  CV: [ ] chest pain [ ] orthopnea [ ] palpitations [ ] murmur  Resp: [ ] cough [ ] shortness of breath [ ] dyspnea [ ] wheezing [ ] sputum [ ] hemoptysis  GI: [ ] nausea [ ] vomiting [ ] diarrhea [ ] constipation [ ] abd pain [ ] dysphagia   : [ ] dysuria [ ] nocturia [ ] hematuria [ ] increased urinary frequency  Musculoskeletal: [ ] back pain [ ] myalgias [ ] arthralgias [ ] fracture  Skin: [ ] rash [ ] itch  Neurological: [ ] headache [ ] dizziness [ ] syncope [ ] weakness [ ] numbness  Hematologic/Lymphatic: [ ] anemia [ ] bleeding problem  Allergic/Immunologic: [ ] itchy eyes [ ] nasal discharge [ ] hives [ ] angioedema  [ ] All other systems negative  [ ] Unable to assess ROS because ________    OBJECTIVE:  ICU Vital Signs Last 24 Hrs  T(C): 37.3 (06 Jun 2024 07:10), Max: 38.3 (05 Jun 2024 10:00)  T(F): 99.1 (06 Jun 2024 07:10), Max: 100.9 (05 Jun 2024 10:00)  HR: 85 (06 Jun 2024 07:10) (71 - 92)  BP: 79/69 (06 Jun 2024 07:10) (79/69 - 135/62)  BP(mean): 74 (06 Jun 2024 07:10) (60 - 100)  ABP: --  ABP(mean): --  RR: 26 (06 Jun 2024 07:10) (12 - 30)  SpO2: 98% (06 Jun 2024 07:10) (96% - 100%)          06-05 @ 07:01  -  06-06 @ 07:00  --------------------------------------------------------  IN: 460 mL / OUT: 800 mL / NET: -340 mL      CAPILLARY BLOOD GLUCOSE      POCT Blood Glucose.: 162 mg/dL (04 Jun 2024 16:30)      PHYSICAL EXAM:  General:   HEENT:   Neck:   Respiratory:   Cardiovascular:   Abdomen:   Extremities:   Skin:   Neurological:  Psychiatry:    LINES:    HOSPITAL MEDICATIONS:  MEDICATIONS  (STANDING):  chlorhexidine 2% Cloths 1 Application(s) Topical <User Schedule>  midodrine 20 milliGRAM(s) Oral every 8 hours  rivaroxaban 20 milliGRAM(s) Oral with dinner    MEDICATIONS  (PRN):  acetaminophen     Tablet .. 650 milliGRAM(s) Oral every 6 hours PRN Temp greater or equal to 38C (100.4F)      LABS:    Hgb Trend: 10.1<--, 9.6<--, 10.1<--, 11.0<--, 12.4<--                  MICROBIOLOGY:     RADIOLOGY:  [ ] Reviewed and interpreted by me CHIEF COMPLAINT:    Interval Events:  no o/n events    REVIEW OF SYSTEMS:  [x ] Unable to assess ROS because minimally responsive    OBJECTIVE:  ICU Vital Signs Last 24 Hrs  T(C): 37.3 (06 Jun 2024 07:10), Max: 38.3 (05 Jun 2024 10:00)  T(F): 99.1 (06 Jun 2024 07:10), Max: 100.9 (05 Jun 2024 10:00)  HR: 85 (06 Jun 2024 07:10) (71 - 92)  BP: 79/69 (06 Jun 2024 07:10) (79/69 - 135/62)  BP(mean): 74 (06 Jun 2024 07:10) (60 - 100)  ABP: --  ABP(mean): --  RR: 26 (06 Jun 2024 07:10) (12 - 30)  SpO2: 98% (06 Jun 2024 07:10) (96% - 100%)          06-05 @ 07:01  -  06-06 @ 07:00  --------------------------------------------------------  IN: 460 mL / OUT: 800 mL / NET: -340 mL      CAPILLARY BLOOD GLUCOSE      POCT Blood Glucose.: 162 mg/dL (04 Jun 2024 16:30)      PHYSICAL EXAM:  General: NAD, non toxic appearing  HEENT: dry MM  Neck: supple  Respiratory: coarse BS  Cardiovascular: s1s2 RRR  Abdomen: soft, non tender, non distended  Extremities: warm, no edema or clubbing  Skin: stage 1 sacral, DTI R heel, R stage 2 buttocks  Neurological: unable to assess  Psychiatry: unable to assess    LINES:  [facility]    HOSPITAL MEDICATIONS:  MEDICATIONS  (STANDING):  chlorhexidine 2% Cloths 1 Application(s) Topical <User Schedule>  midodrine 20 milliGRAM(s) Oral every 8 hours  rivaroxaban 20 milliGRAM(s) Oral with dinner    MEDICATIONS  (PRN):  acetaminophen     Tablet .. 650 milliGRAM(s) Oral every 6 hours PRN Temp greater or equal to 38C (100.4F)      LABS:    Hgb Trend: 10.1<--, 9.6<--, 10.1<--, 11.0<--, 12.4<--                  MICROBIOLOGY:     RADIOLOGY:  [ ] Reviewed and interpreted by me

## 2024-06-06 NOTE — DISCHARGE NOTE PROVIDER - PROVIDER TOKENS
FREE:[LAST:[[facility]PHONE:[(   )    -],FAX:[(   )    -],ADDRESS:[Please follow up with [facility]],FOLLOWUP:[1-3 days]]

## 2024-06-06 NOTE — PROGRESS NOTE ADULT - REASON FOR ADMISSION
Sepsis, Dehydration

## 2024-06-06 NOTE — DISCHARGE NOTE PROVIDER - DETAILS OF MALNUTRITION DIAGNOSIS/DIAGNOSES
This patient has been assessed with a concern for Malnutrition and was treated during this hospitalization for the following Nutrition diagnosis/diagnoses:     -  06/02/2024: Moderate protein-calorie malnutrition

## 2024-06-06 NOTE — DISCHARGE NOTE PROVIDER - NSDCMRMEDTOKEN_GEN_ALL_CORE_FT
LORazepam 2 mg/mL oral concentrate: 0.5 milliliter(s) orally every 6 hours as needed for  anxiety MDD: 2 mL  midodrine 10 mg oral tablet: 2 tab(s) orally every 8 hours  morphine 20 mg/mL oral concentrate: 0.25 milliliter(s) sublingual every 6 hours as needed for pain or shortness of breath MDD: 1 mL  rivaroxaban 20 mg oral tablet: 1 tab(s) orally once a day (before a meal)  tamsulosin 0.4 mg oral capsule: 1 cap(s) orally once a day   LORazepam 2 mg/mL oral concentrate: 0.5 milliliter(s) orally every 6 hours as needed for  anxiety MDD: 2 mL  LORazepam 2 mg/mL oral concentrate: 0.5 milliliter(s) orally every 6 hours as needed for  anxiety MDD: 2 mL  midodrine 10 mg oral tablet: 2 tab(s) orally every 8 hours  morphine 20 mg/mL oral concentrate: 0.25 milliliter(s) sublingual every 4 hours as needed for  pain or shortness of breath as needed for pain or shortness of breath MDD: 1.5 mL  morphine 20 mg/mL oral concentrate: 0.25 milliliter(s) sublingual every 6 hours as needed for pain or shortness of breath MDD: 1 mL  rivaroxaban 20 mg oral tablet: 1 tab(s) orally once a day (before a meal)  tamsulosin 0.4 mg oral capsule: 1 cap(s) orally once a day

## 2024-06-06 NOTE — PROGRESS NOTE ADULT - ASSESSMENT
88 yo m  with hx Afib on Xarelto, Alzheimer's Dementia (minimally verbal), HTN, HLD, CAD, DM2, CVA, BPH, PVD, fall s/p fx L2 fx s/p L1-L4 fusion admitted  to the ICU with septic shock  
Mr. De is an 89 yo m  with hx Afib on Xarelto, Alzheimer's Dementia (minimally verbal), HTN, HLD, CAD, DM2, CVA, BPH, PVD, fall s/p fx L2 fx s/p L1-L4 fusion admitted with     1. Septic shock  2. Dehydration/Hypernatremia  3. ALBINO  4. AMS  5. Lactemia  6. Elevated Trop, likely demand    NEURO: Baseline dementia, resume meds when able to take PO.  HOB >30 degrees. aspiration precautions.  CV: actively titrating levophed for map >65, weaning as tolerated.  gentle hydration as tolerated  RESP: NC for spo2 >92%, neb prn. Small pleural effusion in L base.  RENAL: avoid nephrotoxic meds, renally dose meds, trend urine output, bun /cr and electrolytes.  replace lytes as needed.  wilson for strict I&Os. hypernatremic, c/w hydration. ALBINO likely ATN. Persistent lactic acidosis. Pending repeat labs with resuscitation for clearance.  GI: NPO  ENDO: poct q6hr while NPO   ID: empiric coverage with azithromycin and ceftriaxone.  cx sent. Pending results. UA was inconclusive.  HEME: Hep gtt for ac until able to take po  DISPO: Spoke to son-in-law Mason who takes care of him and said he wouldn't want him to undergo aggressive measures- cpr and intubation. He said family would be in agreement. Waiting to speak to pt's wife/children.      
Patient planned for Hospice  Goal of care is comfort  SW for [facility]
89M w/ afib, Dementia, HTN, HLD, CAD, DM, CVA, BPH, PVD. Admitted w/ septic shock, E coli UTI, ALBINO. Pt has been weaned off pressors, continue w/ midodrine. Has now been transitioned to comfort measures and is pending d/c home w/ hospice. No further blood draws. Pt completed course of abx. Remains on xarelto. Awaiting delivery of equipment at home for hospice services. Pt is DNR/DNI, no escalation of care, likely d/c home today.         
Mr. De is an 88 yo m  with hx Afib on Xarelto, Alzheimer's Dementia (minimally verbal), HTN, HLD, CAD, DM2, CVA, BPH, PVD, fall s/p fx L2 fx s/p L1-L4 fusion admitted with     1. Septic shock  2. Dehydration/Hypernatremia  3. ALBINO  4. AMS  5. Lactemia  6. Elevated Trop, likely demand    NEURO: Baseline dementia, resume meds when able to take PO.  HOB >30 degrees. aspiration precautions.  CV: actively titrating levophed for map >65, weaning as tolerated.  gentle hydration as tolerated. A fib with RVR. Continue amiodarone drip for now while on pressors.   RESP: NC for spo2 >92%, neb prn. Small pleural effusion in L base.  RENAL: avoid nephrotoxic meds, renally dose meds, trend urine output, bun /cr and electrolytes.  Creatinine improving. replace lytes as needed.  wilson for strict I&Os. hypernatremic, c/w hydration. ALBINO likely ATN. Persistent lactic acidosis. Pending repeat labs with resuscitation for clearance.  GI: NPO  ENDO: poct q6hr while NPO   ID: empiric coverage with azithromycin and ceftriaxone.  cx sent. Pending results. UA was inconclusive. Pending legionella.   HEME: Hep gtt for ac until able to take po  DISPO: Spoke to son-in-law Mason who takes care of him and said he wouldn't want him to undergo aggressive measures- cpr and intubation. He said family would be in agreement. Waiting to speak to pt's wife/children.  Pt's wife currently admitted in ER.    
Pending home hospice
Mr. De is an 89 yo m  with hx Afib on Xarelto, Alzheimer's Dementia (minimally verbal), HTN, HLD, CAD, DM2, CVA, BPH, PVD, fall s/p fx L2 fx s/p L1-L4 fusion admitted with     1. Septic shock  2. Dehydration/Hypernatremia  3. ALBINO  4. AMS  5. Lactemia  6. Elevated Trop, likely demand    NEURO: Baseline dementia, resume meds when able to take PO.  HOB >30 degrees. aspiration precautions.  CV: actively titrating levophed for map >65, weaning as tolerated.  gentle hydration as tolerated. A fib with RVR. Continue amiodarone drip for now while on pressors.   RESP: NC for spo2 >92%, neb prn.   RENAL: avoid nephrotoxic meds, renally dose meds, trend urine output, bun /cr and electrolytes.  Creatinine improving. replace lytes as needed.  wilson for strict I&Os. hypernatremic, c/w hydration. ALBINO likely ATN. Persistent lactic acidosis. Pending repeat labs with resuscitation for clearance.  GI: Pureed diet ordered.  ENDO: poct q6hr while NPO   ID: empiric coverage with azithromycin and ceftriaxone.  cx sent. Pending results. Legionella negative.   HEME: Restart home xarelto  DISPO: Spoke to son-in-law Mason who takes care of him and said he wouldn't want him to undergo aggressive measures- cpr and intubation. Patient made DNR/DNI yesterday, Hospice appropriate. Palliative on the case.

## 2024-06-07 ENCOUNTER — TRANSCRIPTION ENCOUNTER (OUTPATIENT)
Age: 89
End: 2024-06-07

## 2024-06-13 DIAGNOSIS — Z86.73 PERSONAL HISTORY OF TRANSIENT ISCHEMIC ATTACK (TIA), AND CEREBRAL INFARCTION WITHOUT RESIDUAL DEFICITS: ICD-10-CM

## 2024-06-13 DIAGNOSIS — E86.0 DEHYDRATION: ICD-10-CM

## 2024-06-13 DIAGNOSIS — Z92.3 PERSONAL HISTORY OF IRRADIATION: ICD-10-CM

## 2024-06-13 DIAGNOSIS — Z85.46 PERSONAL HISTORY OF MALIGNANT NEOPLASM OF PROSTATE: ICD-10-CM

## 2024-06-13 DIAGNOSIS — F02.80 DEMENTIA IN OTHER DISEASES CLASSIFIED ELSEWHERE, UNSPECIFIED SEVERITY, WITHOUT BEHAVIORAL DISTURBANCE, PSYCHOTIC DISTURBANCE, MOOD DISTURBANCE, AND ANXIETY: ICD-10-CM

## 2024-06-13 DIAGNOSIS — E78.5 HYPERLIPIDEMIA, UNSPECIFIED: ICD-10-CM

## 2024-06-13 DIAGNOSIS — I25.10 ATHEROSCLEROTIC HEART DISEASE OF NATIVE CORONARY ARTERY WITHOUT ANGINA PECTORIS: ICD-10-CM

## 2024-06-13 DIAGNOSIS — I48.91 UNSPECIFIED ATRIAL FIBRILLATION: ICD-10-CM

## 2024-06-13 DIAGNOSIS — N39.0 URINARY TRACT INFECTION, SITE NOT SPECIFIED: ICD-10-CM

## 2024-06-13 DIAGNOSIS — I50.9 HEART FAILURE, UNSPECIFIED: ICD-10-CM

## 2024-06-13 DIAGNOSIS — R65.21 SEVERE SEPSIS WITH SEPTIC SHOCK: ICD-10-CM

## 2024-06-13 DIAGNOSIS — G30.9 ALZHEIMER'S DISEASE, UNSPECIFIED: ICD-10-CM

## 2024-06-13 DIAGNOSIS — Z66 DO NOT RESUSCITATE: ICD-10-CM

## 2024-06-13 DIAGNOSIS — E44.0 MODERATE PROTEIN-CALORIE MALNUTRITION: ICD-10-CM

## 2024-06-13 DIAGNOSIS — Z98.42 CATARACT EXTRACTION STATUS, LEFT EYE: ICD-10-CM

## 2024-06-13 DIAGNOSIS — N17.0 ACUTE KIDNEY FAILURE WITH TUBULAR NECROSIS: ICD-10-CM

## 2024-06-13 DIAGNOSIS — N40.0 BENIGN PROSTATIC HYPERPLASIA WITHOUT LOWER URINARY TRACT SYMPTOMS: ICD-10-CM

## 2024-06-13 DIAGNOSIS — Z79.01 LONG TERM (CURRENT) USE OF ANTICOAGULANTS: ICD-10-CM

## 2024-06-13 DIAGNOSIS — I11.0 HYPERTENSIVE HEART DISEASE WITH HEART FAILURE: ICD-10-CM

## 2024-06-13 DIAGNOSIS — Z79.84 LONG TERM (CURRENT) USE OF ORAL HYPOGLYCEMIC DRUGS: ICD-10-CM

## 2024-06-13 DIAGNOSIS — E11.51 TYPE 2 DIABETES MELLITUS WITH DIABETIC PERIPHERAL ANGIOPATHY WITHOUT GANGRENE: ICD-10-CM

## 2024-06-13 DIAGNOSIS — Z11.52 ENCOUNTER FOR SCREENING FOR COVID-19: ICD-10-CM

## 2024-06-13 DIAGNOSIS — E87.20 ACIDOSIS, UNSPECIFIED: ICD-10-CM

## 2024-06-13 DIAGNOSIS — Z74.01 BED CONFINEMENT STATUS: ICD-10-CM

## 2024-06-13 DIAGNOSIS — Z51.5 ENCOUNTER FOR PALLIATIVE CARE: ICD-10-CM

## 2024-06-13 DIAGNOSIS — Z98.1 ARTHRODESIS STATUS: ICD-10-CM

## 2024-06-13 DIAGNOSIS — R41.82 ALTERED MENTAL STATUS, UNSPECIFIED: ICD-10-CM

## 2024-06-13 DIAGNOSIS — A41.51 SEPSIS DUE TO ESCHERICHIA COLI [E. COLI]: ICD-10-CM

## 2024-06-13 DIAGNOSIS — Z88.8 ALLERGY STATUS TO OTHER DRUGS, MEDICAMENTS AND BIOLOGICAL SUBSTANCES: ICD-10-CM

## 2024-06-13 DIAGNOSIS — E87.0 HYPEROSMOLALITY AND HYPERNATREMIA: ICD-10-CM

## 2025-01-06 NOTE — ED PROVIDER NOTE - CPE EDP CARDIAC NORM
January 7, 2025        John Gongora  [address] [place] [address]        Welcome to [facility]’s Care Management Program.  There is no extra cost to you.     I would like to partner with you to help lower your health risks and reach your goals for healthy living.  I can help you to follow your doctor’s plan of care.  I can offer support and arrange services to help you do the things you want and help you stay as healthy as possible.      [facility]’s Care Management Program Benefits:     Convenient    Specially trained RN   Make your own health care plan   Meds review with a Pharmacist as needed   Link to health care, social and community resources     I will call to tell you more about the program, answer any questions you have and talk with you about your needs.  After the first phone call, we will talk about your health and wellness goals.       If you have any questions or if I can help you in any way, please call me at     [phone]  8:00 AM to 4:00 PM CST (Monday-Friday)    If I am busy, you can leave a message on my private voicemail and I’ll return your call. You have the right not to participate in the administration of care. Just let me know anytime you don't want my help.    I want to help you stay healthy and hope to work with you soon!     Sincerely,    Pam Queen RN  [facility]        normal...

## 2025-02-25 NOTE — PATIENT PROFILE ADULT - RELATIONSHIP TO PATIENT
Please call pt and advise her that we were notified that her carvedilol was recalled.  There was a recall issued for a specific manufacture of the carvedilol.    We should send a new prescription for the same medication so that the pharmacy can dispense a nonrecalled version.    We should send to a local pharmacy so she can start right away.    Which pharmacy would she prefer?  
[facility]

## 2025-07-04 NOTE — ED ADULT NURSE NOTE - NS ED NURSE DISCH DISPOSITION
PROCEDURE NOTE  Date: 7/4/2025   Name: Lei Jaimes  YOB: 1944    CENTRAL LINE    Date/Time: 7/4/2025 2:43 PM    Performed by: Jona Shaikh MD  Authorized by: Jona Shaikh MD  Consent: Written consent obtained.  Risks and benefits: risks, benefits and alternatives were discussed  Consent given by: spouse  Patient identity confirmed: arm band  Time out: Immediately prior to procedure a \"time out\" was called to verify the correct patient, procedure, equipment, support staff and site/side marked as required.  Indications: vascular access  Anesthesia: local infiltration    Anesthesia:  Local Anesthetic: lidocaine 1% without epinephrine    Sedation:  Patient sedated: yes    Preparation: skin prepped with 2% chlorhexidine  Sterile barriers: all five maximum sterile barriers used - cap, mask, sterile gown, sterile gloves, and large sterile sheet  Hand hygiene: hand hygiene performed prior to central venous catheter insertion  Location details: right internal jugular  Patient position: flat  Catheter type: triple lumen  Catheter size: 13.  Pre-procedure: landmarks identified  Ultrasound guidance: yes  Sterile ultrasound techniques: sterile gel and sterile probe covers were used  Number of attempts: 1  Successful placement: yes  Post-procedure: line sutured  Assessment: blood return through all ports, free fluid flow, placement verified by x-ray and no pneumothorax on x-ray  Patient tolerance: patient tolerated the procedure well with no immediate complications                
PROCEDURE NOTE  Date: 7/4/2025   Name: Lei Jaimes  YOB: 1944    Insert Arterial Line    Date/Time: 7/4/2025 2:45 PM    Performed by: Jona Shaikh MD  Authorized by: Jona Shaikh MD  Consent: Written consent obtained.  Risks and benefits: risks, benefits and alternatives were discussed  Time out: Immediately prior to procedure a \"time out\" was called to verify the correct patient, procedure, equipment, support staff and site/side marked as required.  Preparation: Patient was prepped and draped in the usual sterile fashion.  Indications: multiple ABGs, respiratory failure and hemodynamic monitoring  Location: right radial  Seldinger technique: Seldinger technique used  Number of attempts: 2  Post-procedure: line sutured and dressing applied  Patient tolerance: patient tolerated the procedure well with no immediate complications                
PROCEDURE NOTE  Date: 7/4/2025   Name: Lei Jaimes  YOB: 1944    Intubation    Date/Time: 7/4/2025 2:46 PM    Performed by: Jona Shaikh MD  Authorized by: Jona Shaikh MD  Consent: The procedure was performed in an emergent situation.  Indications: respiratory distress, respiratory failure, airway protection and hypoxemia  Intubation method: video-assisted  Patient status: unconscious  Preoxygenation: BVM  Paralytic: none  Laryngoscope size: Mac 3  Tube size: 7.5 mm  Tube type: cuffed  Number of attempts: 1  Cords visualized: yes  Post-procedure assessment: chest rise and ETCO2 monitor  Breath sounds: equal  Cuff inflated: yes  ETT to lip: 23 cm  Tube secured with: ETT viveros  Chest x-ray interpreted by me.  Chest x-ray findings: endotracheal tube too high  Tube repositioned: tube repositioned successfully  Patient tolerance: patient tolerated the procedure well with no immediate complications                
Discharged

## 2025-07-23 NOTE — PHYSICAL THERAPY INITIAL EVALUATION ADULT - NS ASR RISK AREAS PT EVAL
Discharge Instructions Following Retinal Surgery    AVOID excessive bending at the waist, nose blowing, or any strenuous activity for now.  AVOID rubbing your eye or getting the eye patch wet.  AVOID sleeping flat on your back if you received a bubble of gas or air.  AVOID driving for at least the first 24 hours after surgery.    Leave the patch and shield on until your next day appointment.  You may take regular Tylenol or Ibuprofen as needed for mild discomfort.  You should not develop SEVERE pain overnight.  You may go on a short walk, watch TV, or read when you get home.  It is normal to see a small amount of blood at the edge of the patch.    Eye drops have been ordered but you do not need to take them tonight.  Your post-op day 1 appointment will be at the clinic where you were seen initially.  The patch will be removed in the clinic by Dr. Manjarrez's staff.  Further instructions will be given at that time.    Positioning requirements have been verbally communicated to you by Dr. Manjarrez.  If you were given a GREEN bracelet, do NOT cut it off until instructed by the physician.    Juventino Manjarrez MD  [facility]  [address] [place] [address] [address]    FOR ANY QUESTIONS OR CONCERNS PLEASE CALL:  [phone]    Care After Anesthesia or Sedation    After discharge   Due to the medicine given, someone must drive you home. It is strongly recommended to have someone stay with you at home the day of discharge and the night after surgery.   If you have infants or small children at home, please have someone help you for at least 24 hours after your surgery.   Do not drive for at least 24 hours after surgery (or as told by your doctor).   Rest for the remainder of the day. Go up and down stairs slowly.   Do not smoke after surgery. Smoking can delay healing.   Do not operate heavy or potential harmful equipment.   Do not make legally binding decisions.   Do not drink alcohol for 24 hours.    Diet   Nausea may be  expected for the first 24 to 48 hours. Start eating a bland diet (toast, gelatin, 7-up, hot cereal, crackers, sherbet, broth soup).   Drink plenty of fluids (6 to 8 glasses of water a day).   Resume your regular diet as able.   Avoid greasy or spicy foods for 24 hours.    Urination   The effects of anesthetics may cause some people to have trouble passing urine the day of surgery. Drink a lot of fluids to help prevent this.   Try to urinate within 8 hours of surgery.   If you are unable to pass urine and feel like you need to, call your doctor or the hospital.    Pain control   Some incisions are injected with a long acting local anesthetic; it will wear off in 4 to 6 hours. You can expect to have some pain at this time.   Treat your pain with the prescribed pain medicine before it wears off. Do not wait until your pain becomes severe.   Ask your nurse when you had your last pain medicine, so you know when you can take another one after you get home.      Call your doctor if you have:   Nausea and vomiting that does not stop   Fever over 101° F   Pain not relieved by pain medication   If you are unable to pass your urine or you have not passed urine in the last 8 hours.   Unusual changes in behavior   Dizziness   Hives  If you are not able to reach your doctor, you may call the emergency department.      safety awareness/fall

## (undated) DEVICE — MEDICATION LABELS W MARKER

## (undated) DEVICE — SUT VICRYL 2-0 18" CP-2 UNDYED (POP-OFF)

## (undated) DEVICE — VENODYNE/SCD SLEEVE CALF LARGE

## (undated) DEVICE — VISITEC 4X4

## (undated) DEVICE — Device

## (undated) DEVICE — ELCTR BIPOLAR PROBE

## (undated) DEVICE — DRAPE 3/4 SHEET W REINFORCEMENT 56X77"

## (undated) DEVICE — GOWN TRIMAX LG

## (undated) DEVICE — LAP PAD 18 X 18"

## (undated) DEVICE — DRAIN JACKSON PRATT 3 SPRING RESERVOIR W 10FR PVC DRAIN

## (undated) DEVICE — STRYKER BONE MILL BLADE FINE 3.2MM

## (undated) DEVICE — ELCTR SUBDERMAL CORKSCREW NDL 1.2MM

## (undated) DEVICE — SPHERE MARKER (5 SPHERES)

## (undated) DEVICE — NDL SPINAL 22G X 3.5" (BLACK)

## (undated) DEVICE — MIDAS REX MR8 METAL CUTTER 3MM X 9CM

## (undated) DEVICE — DRSG XEROFORM 1 X 8"

## (undated) DEVICE — GLV 8.5 PROTEXIS (WHITE)

## (undated) DEVICE — VENODYNE/SCD SLEEVE CALF MEDIUM

## (undated) DEVICE — FOLEY TRAY 16FR LF URINE METER SURESTEP

## (undated) DEVICE — SOL IRR POUR H2O 250ML

## (undated) DEVICE — PACK LUMBAR LAMI

## (undated) DEVICE — GLV 8 PROTEXIS ORTHO (CREAM)

## (undated) DEVICE — GLV 7 PROTEXIS (WHITE)

## (undated) DEVICE — DRAPE BACK TABLE COVER HEAVY DUTY 60"

## (undated) DEVICE — DRAPE MAYO STAND 30"

## (undated) DEVICE — DRAIN RESERVOIR FOR JACKSON PRATT 100CC CARDINAL

## (undated) DEVICE — MARKING PEN W RULER

## (undated) DEVICE — GLV 6.5 PROTEXIS (WHITE)

## (undated) DEVICE — SOL IRR POUR NS 0.9% 500ML

## (undated) DEVICE — ELCTR 4-DISC 20MM 49" (RED, BLUE, GREEN, BLACK)

## (undated) DEVICE — WARMING BLANKET UPPER ADULT

## (undated) DEVICE — MIDAS REX LEGEND MATCH HEAD FLUTED LG BORE 3.0MM X 14CM

## (undated) DEVICE — ELCTR PEDICLE SCREW PROBE 3MM BALL 1.8MM X 100MM

## (undated) DEVICE — NDL SPINAL 18G X 3.5" (PINK)

## (undated) DEVICE — PREP CHLORAPREP HI-LITE ORANGE 26ML

## (undated) DEVICE — GLV 7.5 PROTEXIS (WHITE)

## (undated) DEVICE — DRAPE C ARM UNIVERSAL

## (undated) DEVICE — DRAIN JACKSON PRATT 7MM FLAT FULL NO TROCAR

## (undated) DEVICE — ELCTR MONOPOLAR STIMULATOR PROBE FLUSH-TIP

## (undated) DEVICE — ELCTR BOVIE TIP BLADE INSULATED 2.75" EDGE

## (undated) DEVICE — DRAPE 1/2 SHEET 40X57"

## (undated) DEVICE — ELCTR BOVIE PENCIL HANDPIECE

## (undated) DEVICE — SUT VICRYL 0 18" OS-6 (POP-OFF)

## (undated) DEVICE — DRAPE TOWEL BLUE 17" X 24"

## (undated) DEVICE — STAPLER SKIN VISI-STAT 35 WIDE

## (undated) DEVICE — SPECIMEN CONTAINER 100ML

## (undated) DEVICE — NDL PAK BEVEL

## (undated) DEVICE — POSITIONER FOAM EGG CRATE ULNAR 2PCS (PINK)

## (undated) DEVICE — WARMING BLANKET LOWER ADULT

## (undated) DEVICE — ELCTR SUBDERMAL NDL CLASSIC 1.5M X 59" (6 COLOR)

## (undated) DEVICE — DRSG TELFA 3 X 8

## (undated) DEVICE — ELCTR SUBDERMAL NDL 27G X 1/2" WITH TWISTED PAIR

## (undated) DEVICE — GLV 8 PROTEXIS (WHITE)

## (undated) DEVICE — DRSG BIOPATCH DISK W CHG 1" W 7.0MM HOLE